# Patient Record
Sex: MALE | Race: WHITE | Employment: OTHER | URBAN - METROPOLITAN AREA
[De-identification: names, ages, dates, MRNs, and addresses within clinical notes are randomized per-mention and may not be internally consistent; named-entity substitution may affect disease eponyms.]

---

## 2017-02-16 ENCOUNTER — ANESTHESIA EVENT (OUTPATIENT)
Dept: ENDOSCOPY | Age: 72
End: 2017-02-16
Payer: MEDICARE

## 2017-02-17 ENCOUNTER — HOSPITAL ENCOUNTER (OUTPATIENT)
Age: 72
Setting detail: OUTPATIENT SURGERY
Discharge: HOME | End: 2017-02-17
Attending: INTERNAL MEDICINE | Admitting: INTERNAL MEDICINE
Payer: MEDICARE

## 2017-02-17 ENCOUNTER — SURGERY (OUTPATIENT)
Age: 72
End: 2017-02-17

## 2017-02-17 ENCOUNTER — ANESTHESIA (OUTPATIENT)
Dept: ENDOSCOPY | Age: 72
End: 2017-02-17
Payer: MEDICARE

## 2017-02-17 VITALS
DIASTOLIC BLOOD PRESSURE: 75 MMHG | BODY MASS INDEX: 23.33 KG/M2 | WEIGHT: 157.5 LBS | SYSTOLIC BLOOD PRESSURE: 132 MMHG | RESPIRATION RATE: 17 BRPM | TEMPERATURE: 97.1 F | HEART RATE: 52 BPM | OXYGEN SATURATION: 100 % | HEIGHT: 69 IN

## 2017-02-17 PROCEDURE — 76040000019: Performed by: INTERNAL MEDICINE

## 2017-02-17 PROCEDURE — 74011250636 HC RX REV CODE- 250/636

## 2017-02-17 PROCEDURE — 77030018846 HC SOL IRR STRL H20 ICUM -A: Performed by: INTERNAL MEDICINE

## 2017-02-17 PROCEDURE — 88305 TISSUE EXAM BY PATHOLOGIST: CPT | Performed by: INTERNAL MEDICINE

## 2017-02-17 PROCEDURE — 74011000250 HC RX REV CODE- 250: Performed by: NURSE ANESTHETIST, CERTIFIED REGISTERED

## 2017-02-17 PROCEDURE — 74011250636 HC RX REV CODE- 250/636: Performed by: NURSE ANESTHETIST, CERTIFIED REGISTERED

## 2017-02-17 PROCEDURE — 77030019988 HC FCPS ENDOSC DISP BSC -B: Performed by: INTERNAL MEDICINE

## 2017-02-17 PROCEDURE — 77030008565 HC TBNG SUC IRR ERBE -B: Performed by: INTERNAL MEDICINE

## 2017-02-17 PROCEDURE — 76060000031 HC ANESTHESIA FIRST 0.5 HR: Performed by: INTERNAL MEDICINE

## 2017-02-17 RX ORDER — SODIUM CHLORIDE 0.9 % (FLUSH) 0.9 %
5-10 SYRINGE (ML) INJECTION EVERY 8 HOURS
Status: DISCONTINUED | OUTPATIENT
Start: 2017-02-17 | End: 2017-02-17 | Stop reason: HOSPADM

## 2017-02-17 RX ORDER — FAMOTIDINE 10 MG/ML
20 INJECTION INTRAVENOUS ONCE
Status: COMPLETED | OUTPATIENT
Start: 2017-02-17 | End: 2017-02-17

## 2017-02-17 RX ORDER — INSULIN LISPRO 100 [IU]/ML
INJECTION, SOLUTION INTRAVENOUS; SUBCUTANEOUS ONCE
Status: DISCONTINUED | OUTPATIENT
Start: 2017-02-17 | End: 2017-02-17 | Stop reason: HOSPADM

## 2017-02-17 RX ORDER — SODIUM CHLORIDE, SODIUM LACTATE, POTASSIUM CHLORIDE, CALCIUM CHLORIDE 600; 310; 30; 20 MG/100ML; MG/100ML; MG/100ML; MG/100ML
75 INJECTION, SOLUTION INTRAVENOUS CONTINUOUS
Status: DISCONTINUED | OUTPATIENT
Start: 2017-02-17 | End: 2017-02-17 | Stop reason: HOSPADM

## 2017-02-17 RX ORDER — PROPOFOL 10 MG/ML
INJECTION, EMULSION INTRAVENOUS AS NEEDED
Status: DISCONTINUED | OUTPATIENT
Start: 2017-02-17 | End: 2017-02-17 | Stop reason: HOSPADM

## 2017-02-17 RX ORDER — SODIUM CHLORIDE 0.9 % (FLUSH) 0.9 %
5-10 SYRINGE (ML) INJECTION AS NEEDED
Status: DISCONTINUED | OUTPATIENT
Start: 2017-02-17 | End: 2017-02-17 | Stop reason: HOSPADM

## 2017-02-17 RX ORDER — ONDANSETRON 2 MG/ML
4 INJECTION INTRAMUSCULAR; INTRAVENOUS ONCE
Status: CANCELLED | OUTPATIENT
Start: 2017-02-17 | End: 2017-02-17

## 2017-02-17 RX ORDER — SODIUM CHLORIDE 0.9 % (FLUSH) 0.9 %
5-10 SYRINGE (ML) INJECTION AS NEEDED
Status: CANCELLED | OUTPATIENT
Start: 2017-02-17

## 2017-02-17 RX ADMIN — FAMOTIDINE 20 MG: 10 INJECTION, SOLUTION INTRAVENOUS at 08:26

## 2017-02-17 RX ADMIN — SODIUM CHLORIDE, SODIUM LACTATE, POTASSIUM CHLORIDE, AND CALCIUM CHLORIDE 75 ML/HR: 600; 310; 30; 20 INJECTION, SOLUTION INTRAVENOUS at 08:26

## 2017-02-17 RX ADMIN — PROPOFOL 50 MG: 10 INJECTION, EMULSION INTRAVENOUS at 09:23

## 2017-02-17 RX ADMIN — PROPOFOL 100 MG: 10 INJECTION, EMULSION INTRAVENOUS at 09:19

## 2017-02-17 NOTE — PROGRESS NOTES
[email or web address]  [facility] [place], [address]      Brief Procedure Note    Adriana Flor  1945  [id number]    Date of Procedure: 2/17/2017    Preoperative diagnosis: Acute chest wall pain [R07.89]  Gastroesophageal reflux disease, esophagitis presence not specified [K21.9]  Encounter for colonoscopy due to history of adenomatous colonic polyps [Z12.11, Z86.010]  Basal cell carcinoma of face [C44.310]    Postoperative diagnosis: Normal Endoscopy    Type of Anesthesia: MAC (Monitored anesthesia care)    Description of findings: same as post op dx    Procedure: Procedure(s):  ENDOSCOPY w/ biopsies    :  Dr. Lenny Meneses MD    Assistant(s): Endoscopy Technician-1: John Dalton  Endoscopy Technician-2: Aury Salas  Endoscopy RN-1: Marely Amaral RN  Endoscopy RN-2: David Wells RN    EBL:None    Specimens:   ID Type Source Tests Collected by Time Destination   1 : Mid and Distal Esophagus biopsies Preservative Esophagus  Lenny Meneses MD 2/17/2017 1067 Pathology       Findings: See printed and scanned procedure note    Complications: None    Dr. Lenny Meneses MD  2/17/2017  9:28 AM

## 2017-02-17 NOTE — H&P
H&P is updated and reviewed on procedure note, physical exam/medications/allergies were reviewed immediately prior to anesthesia    Manual MD [place]  [facility].  [email or web address]  Phone: [phone]  Pager: [phone]  Cell: [phone]. [email or web address]

## 2017-02-17 NOTE — DISCHARGE INSTRUCTIONS
Upper GI Endoscopy: What to Expect at [address] [place] [address]  After you have an endoscopy, you will stay at the hospital or clinic for 1 to 2 hours. This will allow the medicine to wear off. You will be able to go home after your doctor or nurse checks to make sure you are not having any problems. You may have to stay overnight if you had treatment during the test. You may have a sore throat for a day or two after the test.  This care sheet gives you a general idea about what to expect after the test.  How can you care for yourself at home? Activity  · Rest as much as you need to after you go home. · You should be able to go back to your usual activities the day after the test.  Diet  · Follow your doctor's directions for eating after the test.  · Drink plenty of fluids (unless your doctor has told you not to). Medications  · If you have a sore throat the day after the test, use an over-the-counter spray to numb your throat. Follow-up care is a key part of your treatment and safety. Be sure to make and go to all appointments, and call your doctor if you are having problems. It's also a good idea to know your test results and keep a list of the medicines you take. When should you call for help? Call 911 anytime you think you may need emergency care. For example, call if:  · You passed out (lost consciousness). · You cough up blood. · You vomit blood or what looks like coffee grounds. · You pass maroon or very bloody stools. Call your doctor now or seek immediate medical care if:  · You have trouble swallowing. · You have belly pain. · Your stools are black and tarlike or have streaks of blood. · You are sick to your stomach or cannot keep fluids down. Watch closely for changes in your health, and be sure to contact your doctor if:  · Your throat still hurts after a day or two. · You do not get better as expected. Where can you learn more? Go to http://jimena-catarino.info/.   Enter A044 in the search box to learn more about \"Upper GI Endoscopy: What to Expect at Home. \"  Current as of: August 9, 2016  Content Version: 11.1  © 3301-6139 [employer]. Care instructions adapted under license by [employer] (which disclaims liability or warranty for this information). If you have questions about a medical condition or this instruction, always ask your healthcare professional. [address] any warranty or liability for your use of this information. DISCHARGE SUMMARY from Nurse    The following personal items are in your possession at time of discharge:    Dental Appliances: Uppers, Lowers  Visual Aid: Glasses  Hearing Aids/Status: Bilateral (does not wear)               PATIENT INSTRUCTIONS:    After general anesthesia or intravenous sedation, for 24 hours or while taking prescription Narcotics:  · Limit your activities  · Do not drive and operate hazardous machinery  · Do not make important personal or business decisions  · Do  not drink alcoholic beverages  · If you have not urinated within 8 hours after discharge, please contact your surgeon on call. Report the following to your surgeon:  · Excessive pain, swelling, redness or odor of or around the surgical area  · Temperature over 100.5  · Nausea and vomiting lasting longer than 4 hours or if unable to take medications  · Any signs of decreased circulation or nerve impairment to extremity: change in color, persistent  numbness, tingling, coldness or increase pain  · Any questions      *  Please give a list of your current medications to your Primary Care Provider. *  Please update this list whenever your medications are discontinued, doses are      changed, or new medications (including over-the-counter products) are added. *  Please carry medication information at all times in case of emergency situations.           These are general instructions for a healthy lifestyle:    No smoking/ No tobacco products/ Avoid exposure to second hand smoke    Surgeon General's Warning:  Quitting smoking now greatly reduces serious risk to your health. Obesity, smoking, and sedentary lifestyle greatly increases your risk for illness    A healthy diet, regular physical exercise & weight monitoring are important for maintaining a healthy lifestyle    You may be retaining fluid if you have a history of heart failure or if you experience any of the following symptoms:  Weight gain of 3 pounds or more overnight or 5 pounds in a week, increased swelling in our hands or feet or shortness of breath while lying flat in bed. Please call your doctor as soon as you notice any of these symptoms; do not wait until your next office visit. Recognize signs and symptoms of STROKE:    F-face looks uneven    A-arms unable to move or move unevenly    S-speech slurred or non-existent    T-time-call 911 as soon as signs and symptoms begin-DO NOT go       Back to bed or wait to see if you get better-TIME IS BRAIN. Warning Signs of HEART ATTACK     Call 911 if you have these symptoms:   Chest discomfort. Most heart attacks involve discomfort in the center of the chest that lasts more than a few minutes, or that goes away and comes back. It can feel like uncomfortable pressure, squeezing, fullness, or pain.  Discomfort in other areas of the upper body. Symptoms can include pain or discomfort in one or both arms, the back, neck, jaw, or stomach.  Shortness of breath with or without chest discomfort.  Other signs may include breaking out in a cold sweat, nausea, or lightheadedness. Don't wait more than five minutes to call 911 - MINUTES MATTER! Fast action can save your life. Calling 911 is almost always the fastest way to get lifesaving treatment. Emergency Medical Services staff can begin treatment when they arrive -- up to an hour sooner than if someone gets to the hospital by car.        The discharge information has been reviewed with the patient and spouse. The patient and spouse verbalized understanding. Discharge medications reviewed with the patient and spouse and appropriate educational materials and side effects teaching were provided.

## 2017-02-17 NOTE — IP AVS SNAPSHOT
Obed Zaidi 
 
 
 [address] Patient: Chelsy Bai MRN: [id number] QYB:[birth year] You are allergic to the following Allergen Reactions Naproxen Unable to Obtain GI Issues Recent Documentation Height Weight BMI Smoking Status 1.753 m 71.4 kg 23.26 kg/m2 Former Smoker Emergency Contacts Name Discharge Info Relation Home Work Mobile Clinton Gonzalez DISCHARGE CAREGIVER [3] Spouse [3] [phone] About your hospitalization You were admitted on:  February 17, 2017 You last received care in the:  [facility] PACU You were discharged on:  February 17, 2017 Unit phone number:  [phone] Why you were hospitalized Your primary diagnosis was:  Not on File Providers Seen During Your Hospitalizations Provider Role Specialty Primary office phone Brad Diaz MD Attending Provider Gastroenterology [phone] Your Primary Care Physician (PCP) Primary Care Physician Office Phone Office Fax [address], [address] Follow-up Information Follow up With Details Comments Contact Info MD Raegan Hollingsworth [facility] [address]  [address] [facility] [address] 
[phone] Brad Diaz MD  Follow up as instructed [address] [place] [address] [address] [facility] [address] 
[phone] Current Discharge Medication List  
  
CONTINUE these medications which have NOT CHANGED Dose & Instructions Dispensing Information Comments Morning Noon Evening Bedtime  
 alfuzosin SR 10 mg SR tablet Commonly known as:  Rubné Chain Your next dose is: Today, Tomorrow Other:  _________ Dose:  10 mg Take 1 Tab by mouth daily (after dinner). Quantity:  90 Tab Refills:  3  
     
   
   
   
  
 finasteride 5 mg tablet Commonly known as:  PROSCAR Your next dose is: Today, Tomorrow Other:  _________ TAKE 1 TABLET BY MOUTH DAILY Quantity:  90 Tab Refills:  0  
     
   
   
   
  
 omeprazole 20 mg capsule Commonly known as:  PRILOSEC Your next dose is: Today, Tomorrow Other:  _________ Dose:  20 mg Take 20 mg by mouth daily. Refills:  0 Discharge Instructions Upper GI Endoscopy: What to Expect at [facility] Your Recovery After you have an endoscopy, you will stay at the hospital or clinic for 1 to 2 hours. This will allow the medicine to wear off. You will be able to go home after your doctor or nurse checks to make sure you are not having any problems. You may have to stay overnight if you had treatment during the test. You may have a sore throat for a day or two after the test. 
This care sheet gives you a general idea about what to expect after the test. 
How can you care for yourself at home? Activity · Rest as much as you need to after you go home. · You should be able to go back to your usual activities the day after the test. 
Diet · Follow your doctor's directions for eating after the test. 
· Drink plenty of fluids (unless your doctor has told you not to). Medications · If you have a sore throat the day after the test, use an over-the-counter spray to numb your throat. Follow-up care is a key part of your treatment and safety. Be sure to make and go to all appointments, and call your doctor if you are having problems. It's also a good idea to know your test results and keep a list of the medicines you take. When should you call for help? Call 911 anytime you think you may need emergency care. For example, call if: 
· You passed out (lost consciousness). · You cough up blood. · You vomit blood or what looks like coffee grounds. · You pass maroon or very bloody stools. Call your doctor now or seek immediate medical care if: 
· You have trouble swallowing. · You have belly pain. · Your stools are black and tarlike or have streaks of blood. · You are sick to your stomach or cannot keep fluids down. Watch closely for changes in your health, and be sure to contact your doctor if: 
· Your throat still hurts after a day or two. · You do not get better as expected. Where can you learn more? Go to http://jimena-catarino.info/. Enter [phone] in the search box to learn more about \"Upper GI Endoscopy: What to Expect at Home. \" Current as of: August 9, 2016 Content Version: 11.1 © 9173-2317 [employer]. Care instructions adapted under license by "[employer] (which disclaims liability or warranty for this information). If you have questions about a medical condition or this instruction, always ask your healthcare professional. [address] any warranty or liability for your use of this information. DISCHARGE SUMMARY from Nurse The following personal items are in your possession at time of discharge: 
 
Dental Appliances: Uppers, Lowers Visual Aid: Glasses Hearing Aids/Status: Bilateral (does not wear) PATIENT INSTRUCTIONS: 
 
 
F-face looks uneven A-arms unable to move or move unevenly S-speech slurred or non-existent T-time-call 911 as soon as signs and symptoms begin-DO NOT go Back to bed or wait to see if you get better-TIME IS BRAIN. Warning Signs of HEART ATTACK Call 911 if you have these symptoms: 
? Chest discomfort. Most heart attacks involve discomfort in the center of the chest that lasts more than a few minutes, or that goes away and comes back. It can feel like uncomfortable pressure, squeezing, fullness, or pain. ? Discomfort in other areas of the upper body. Symptoms can include pain or discomfort in one or both arms, the back, neck, jaw, or stomach. ? Shortness of breath with or without chest discomfort. ? Other signs may include breaking out in a cold sweat, nausea, or lightheadedness. Don't wait more than five minutes to call [address]! Fast action can save your life. Calling 911 is almost always the fastest way to get lifesaving treatment. Emergency Medical Services staff can begin treatment when they arrive  up to an hour sooner than if someone gets to the hospital by car. The discharge information has been reviewed with the patient and spouse. The patient and spouse verbalized understanding. Discharge medications reviewed with the patient and spouse and appropriate educational materials and side effects teaching were provided. Discharge Orders None Introducing [facility]! Opal Willard introduces [employer] patient portal. Now you can access parts of your medical record, email your doctor's office, and request medication refills online. 1. In your internet browser, go to [email or web address] 2. Click on the First Time User? Click Here link in the Sign In box. You will see the New Member Sign Up page. 3. Enter your Valid[employer]-IVC Access Code exactly as it appears below. You will not need to use this code after youve completed the sign-up process. If you do not sign up before the expiration date, you must request a new code. · [employer]C Access Code: [email or web address] Expires: 4/5/2017  1:55 PM 
 
4. Enter the last four digits of your Social Security Number (xxxx) and Date of Birth (mm/dd/yyyy) as indicated and click Submit. You will be taken to the next sign-up page. 5. Create a Needlet ID. This will be your Valid[employer]-IVC login ID and cannot be changed, so think of one that is secure and easy to remember. 6. Create a [employer] password. You can change your password at any time. 7. Enter your Password Reset Question and Answer. This can be used at a later time if you forget your password. 8. Enter your e-mail address. You will receive e-mail notification when new information is available in [address] 9. Click Sign Up. You can now view and download portions of your medical record. 10. Click the Download Summary menu link to download a portable copy of your medical information. If you have questions, please visit the Frequently Asked Questions section of the Loku website. Remember, Loku is NOT to be used for urgent needs. For medical emergencies, dial 911. Now available from your iPhone and Android! General Information Please provide this summary of care documentation to your next provider. Patient Signature:  ____________________________________________________________ Date:  ____________________________________________________________  
  
Yann Graves Provider Signature:  ____________________________________________________________ Date:  ____________________________________________________________

## 2017-02-17 NOTE — ANESTHESIA POSTPROCEDURE EVALUATION
Post-Anesthesia Evaluation & Assessment    Visit Vitals    /75 (BP 1 Location: Right arm, BP Patient Position: At rest)    Pulse (!) 52    Temp 36.2 °C (97.1 °F)    Resp 17    Ht 5' 9\" (1.753 m)    Wt 71.4 kg (157 lb 8 oz)    SpO2 100%    BMI 23.26 kg/m2       Post-operative hydration adequate. Pain score (VAS): 0 Pain Scale 1: Numeric (0 - 10) (02/17/17 1000)  Pain Intensity 1: 0 (02/17/17 1000)   Managed. Mental status & Level of consciousness: alert and oriented x 3    Neurological status: moves all extremities, sensation grossly intact    Pulmonary status: airway patent, no supplemental oxygen required    Complications related to anesthesia: none    Patient has met all discharge requirements.     Additional comments:        Marqutia Baca MD  February 17, 2017

## 2017-02-17 NOTE — ANESTHESIA PREPROCEDURE EVALUATION
Anesthetic History   No history of anesthetic complications            Review of Systems / Medical History  Patient summary reviewed and pertinent labs reviewed    Pulmonary  Within defined limits                 Neuro/Psych   Within defined limits           Cardiovascular                  Exercise tolerance: >4 METS     GI/Hepatic/Renal     GERD           Endo/Other             Other Findings   Comments: Current Smoker? NO       Elective Surgery? Yes       Abstained from smoking 24 hours prior to anesthesia? N/A    Risk Factors for Postoperative nausea/vomiting:       History of postoperative nausea/vomiting? NO       Female? NO       Motion sickness? NO       Intended opioid administration for postoperative analgesia?   NO           Physical Exam    Airway  Mallampati: II  TM Distance: 4 - 6 cm  Neck ROM: normal range of motion   Mouth opening: Normal     Cardiovascular  Regular rate and rhythm,  S1 and S2 normal,  no murmur, click, rub, or gallop             Dental    Dentition: Full upper dentures and Lower partial plate     Pulmonary  Breath sounds clear to auscultation               Abdominal  GI exam deferred       Other Findings            Anesthetic Plan    ASA: 2  Anesthesia type: MAC          Induction: Intravenous  Anesthetic plan and risks discussed with: Patient

## 2017-02-22 ENCOUNTER — HOSPITAL ENCOUNTER (OUTPATIENT)
Dept: LAB | Age: 72
Discharge: HOME | End: 2017-02-22
Payer: MEDICARE

## 2017-02-22 DIAGNOSIS — Z01.818 PRE-OP EVALUATION: ICD-10-CM

## 2017-02-22 LAB
ANION GAP BLD CALC-SCNC: 9 MMOL/L (ref 3–18)
APPEARANCE UR: CLEAR
APTT PPP: 28.1 SEC (ref 23–36.4)
ATRIAL RATE: 62 BPM
BILIRUB UR QL: NEGATIVE
BUN SERPL-MCNC: 24 MG/DL (ref 7–18)
BUN/CREAT SERPL: 21 (ref 12–20)
CALCIUM SERPL-MCNC: 8.8 MG/DL (ref 8.5–10.1)
CALCULATED P AXIS, ECG09: 46 DEGREES
CALCULATED R AXIS, ECG10: 14 DEGREES
CALCULATED T AXIS, ECG11: 36 DEGREES
CHLORIDE SERPL-SCNC: 105 MMOL/L (ref 100–108)
CO2 SERPL-SCNC: 27 MMOL/L (ref 21–32)
COLOR UR: YELLOW
CREAT SERPL-MCNC: 1.12 MG/DL (ref 0.6–1.3)
DIAGNOSIS, 93000: NORMAL
ERYTHROCYTE [DISTWIDTH] IN BLOOD BY AUTOMATED COUNT: 13.4 % (ref 11.6–14.5)
GLUCOSE SERPL-MCNC: 115 MG/DL (ref 74–99)
GLUCOSE UR STRIP.AUTO-MCNC: NEGATIVE MG/DL
HCT VFR BLD AUTO: 42 % (ref 36–48)
HGB BLD-MCNC: 14.1 G/DL (ref 13–16)
HGB UR QL STRIP: NEGATIVE
INR PPP: 0.9 (ref 0.8–1.2)
KETONES UR QL STRIP.AUTO: NEGATIVE MG/DL
LEUKOCYTE ESTERASE UR QL STRIP.AUTO: NEGATIVE
MCH RBC QN AUTO: 31.3 PG (ref 24–34)
MCHC RBC AUTO-ENTMCNC: 33.6 G/DL (ref 31–37)
MCV RBC AUTO: 93.1 FL (ref 74–97)
NITRITE UR QL STRIP.AUTO: NEGATIVE
P-R INTERVAL, ECG05: 134 MS
PH UR STRIP: 6.5 [PH] (ref 5–8)
PLATELET # BLD AUTO: 231 K/UL (ref 135–420)
PMV BLD AUTO: 11.6 FL (ref 9.2–11.8)
POTASSIUM SERPL-SCNC: 4.2 MMOL/L (ref 3.5–5.5)
PROT UR STRIP-MCNC: NEGATIVE MG/DL
PROTHROMBIN TIME: 12.2 SEC (ref 11.5–15.2)
Q-T INTERVAL, ECG07: 424 MS
QRS DURATION, ECG06: 80 MS
QTC CALCULATION (BEZET), ECG08: 430 MS
RBC # BLD AUTO: 4.51 M/UL (ref 4.7–5.5)
SODIUM SERPL-SCNC: 141 MMOL/L (ref 136–145)
SP GR UR REFRACTOMETRY: 1.02 (ref 1–1.03)
UROBILINOGEN UR QL STRIP.AUTO: 1 EU/DL (ref 0.2–1)
VENTRICULAR RATE, ECG03: 62 BPM
WBC # BLD AUTO: 5.9 K/UL (ref 4.6–13.2)

## 2017-02-22 PROCEDURE — 87086 URINE CULTURE/COLONY COUNT: CPT | Performed by: UROLOGY

## 2017-02-22 PROCEDURE — 80048 BASIC METABOLIC PNL TOTAL CA: CPT | Performed by: UROLOGY

## 2017-02-22 PROCEDURE — 36415 COLL VENOUS BLD VENIPUNCTURE: CPT | Performed by: UROLOGY

## 2017-02-22 PROCEDURE — 85730 THROMBOPLASTIN TIME PARTIAL: CPT | Performed by: UROLOGY

## 2017-02-22 PROCEDURE — 85027 COMPLETE CBC AUTOMATED: CPT | Performed by: UROLOGY

## 2017-02-22 PROCEDURE — 85610 PROTHROMBIN TIME: CPT | Performed by: UROLOGY

## 2017-02-22 PROCEDURE — 81003 URINALYSIS AUTO W/O SCOPE: CPT | Performed by: UROLOGY

## 2017-02-24 LAB
BACTERIA SPEC CULT: NORMAL
SERVICE CMNT-IMP: NORMAL

## 2017-03-14 ENCOUNTER — HOSPITAL ENCOUNTER (OUTPATIENT)
Dept: LAB | Age: 72
Discharge: HOME | End: 2017-03-14
Payer: MEDICARE

## 2017-03-14 PROCEDURE — 88307 TISSUE EXAM BY PATHOLOGIST: CPT | Performed by: UROLOGY

## 2017-03-14 PROCEDURE — 88305 TISSUE EXAM BY PATHOLOGIST: CPT | Performed by: UROLOGY

## 2018-10-01 ENCOUNTER — OFFICE VISIT (OUTPATIENT)
Dept: FAMILY MEDICINE CLINIC | Age: 73
End: 2018-10-01

## 2018-10-01 VITALS
HEIGHT: 68 IN | OXYGEN SATURATION: 98 % | WEIGHT: 152 LBS | SYSTOLIC BLOOD PRESSURE: 146 MMHG | RESPIRATION RATE: 16 BRPM | BODY MASS INDEX: 23.04 KG/M2 | TEMPERATURE: 98.2 F | DIASTOLIC BLOOD PRESSURE: 72 MMHG | HEART RATE: 65 BPM

## 2018-10-01 DIAGNOSIS — N40.1 BENIGN PROSTATIC HYPERPLASIA WITH LOWER URINARY TRACT SYMPTOMS, SYMPTOM DETAILS UNSPECIFIED: Primary | ICD-10-CM

## 2018-10-01 DIAGNOSIS — M19.90 ARTHRITIS: ICD-10-CM

## 2018-10-01 DIAGNOSIS — I10 ESSENTIAL HYPERTENSION: ICD-10-CM

## 2018-10-01 DIAGNOSIS — K21.9 GASTROESOPHAGEAL REFLUX DISEASE WITHOUT ESOPHAGITIS: ICD-10-CM

## 2018-10-01 DIAGNOSIS — R36.1 BLOOD IN SEMEN: ICD-10-CM

## 2018-10-01 DIAGNOSIS — E04.9 ENLARGED THYROID GLAND: ICD-10-CM

## 2018-10-01 RX ORDER — FINASTERIDE 5 MG/1
5 TABLET, FILM COATED ORAL DAILY
Qty: 30 TAB | Refills: 1 | Status: SHIPPED | OUTPATIENT
Start: 2018-10-01 | End: 2018-11-15 | Stop reason: SDUPTHER

## 2018-10-01 RX ORDER — LISINOPRIL 10 MG/1
10 TABLET ORAL DAILY
Qty: 30 TAB | Refills: 1 | Status: SHIPPED | OUTPATIENT
Start: 2018-10-01 | End: 2018-11-15 | Stop reason: SDUPTHER

## 2018-10-01 RX ORDER — OMEPRAZOLE 20 MG/1
20 CAPSULE, DELAYED RELEASE ORAL DAILY
Qty: 30 CAP | Refills: 1 | Status: SHIPPED | OUTPATIENT
Start: 2018-10-01 | End: 2018-11-15 | Stop reason: SDUPTHER

## 2018-10-01 RX ORDER — ATORVASTATIN CALCIUM 20 MG/1
20 TABLET, FILM COATED ORAL DAILY
Qty: 30 TAB | Refills: 1 | Status: SHIPPED | OUTPATIENT
Start: 2018-10-01 | End: 2018-11-15 | Stop reason: SDUPTHER

## 2018-10-01 RX ORDER — ALFUZOSIN HYDROCHLORIDE 10 MG/1
10 TABLET, EXTENDED RELEASE ORAL
Qty: 30 TAB | Refills: 1 | Status: SHIPPED | OUTPATIENT
Start: 2018-10-01 | End: 2018-11-15 | Stop reason: SDUPTHER

## 2018-10-01 NOTE — PROGRESS NOTES
HISTORY OF PRESENT ILLNESS Nataly Curiel is a 72 y.o. male. HPI: Here as a new patient to get establish care. His prior PCP passed away. H/o hypertension. On medication. Today mild elevated blood pressure but provided home blood pressure log and noted blood pressure was wnl. Asymptomatic. Denies any headache, dizziness, no chest pain or trouble breathing, no arm or leg weakness. No nausea or vomiting, no weight or appetite changes, no depression or anxiety. No  bowel complains, no palpitation, no diaphoresis. No abdominal pain. No unusual fatigue. No sleep concern. H/o BPH. Currently on symptomatic treatment and denies any urinary complains. No fever. Had blood in semen and under urology care. No exam noted palpable enlarge thyroid gland. Denies any trouble swallowing or change in voice. No appetite or weight changes. No unusual fatigue. No bowel movement changes. No mood changes. Visit Vitals  /72 (BP 1 Location: Left arm, BP Patient Position: Sitting)  Pulse 65  Temp 98.2 °F (36.8 °C) (Oral)  Resp 16  
 Ht 5' 8.25\" (1.734 m)  Wt 152 lb (68.9 kg)  SpO2 98%  BMI 22.94 kg/m2 Also bilateral hand arthritis and stiffness. Following Dr. Juan Patterson and getting symptomatic treatment. Mentioned that prior PCP had done work up for Rheumatoid arthritis and it was negative. Had taken shingle vaccine. Has taken both pneumonia and flu shot. H/o GERD. Asymptomatic at this time. Post EGD and no concern. ROS: see HPI Physical Exam  
Constitutional: He is oriented to person, place, and time. No distress. Neck: Thyromegaly present. Cardiovascular: Normal rate, regular rhythm and normal heart sounds. Pulmonary/Chest: CTA Abdominal: Soft. Bowel sounds are normal. There is no tenderness. Musculoskeletal: He exhibits no edema. Lymphadenopathy:  
  He has no cervical adenopathy. Neurological: He is oriented to person, place, and time. Psychiatric: His behavior is normal.  
 
 
ASSESSMENT and PLAN 
  ICD-10-CM ICD-9-CM 1. Benign prostatic hyperplasia with lower urinary tract symptoms, symptom details unspecified: following urology. On medical treatment. N40.1 600.01   
2. Essential hypertension: initially was elevated. Home blood pressure log was wnl. For now asymptomatic. Will observe. I10 401.9 3. Gastroesophageal reflux disease without esophagitis: post EGD. Now stable on PPI.  K21.9 530.81   
 post EGD 4. Blood in semen: following urology. R36.1 608.82   
5. Arthritis: symptomatic treatment with NSAID, ice and home exercise. Following Dr. Yrn Alexis. M19.90 716.90   
6. Enlarged thyroid gland: getting ultrasound and thyroid function. E04.9 240.9 US THYROID/PARATHYROID/SOFT TISS  
   TSH 3RD GENERATION Pt understood and agree with the plan Review hM Follow-up Disposition: Not on File 2 months,.

## 2018-10-01 NOTE — PATIENT INSTRUCTIONS
Arthritis: Care Instructions Your Care Instructions Arthritis, also called osteoarthritis, is a breakdown of the cartilage that cushions your joints. When the cartilage wears down, your bones rub against each other. This causes pain and stiffness. Many people have some arthritis as they age. Arthritis most often affects the joints of the spine, hands, hips, knees, or feet. You can take simple measures to protect your joints, ease your pain, and help you stay active. Follow-up care is a key part of your treatment and safety. Be sure to make and go to all appointments, and call your doctor if you are having problems. It's also a good idea to know your test results and keep a list of the medicines you take. How can you care for yourself at home? · Stay at a healthy weight. Being overweight puts extra strain on your joints. · Talk to your doctor or physical therapist about exercises that will help ease joint pain. ¨ Stretch. You may enjoy gentle forms of yoga to help keep your joints and muscles flexible. ¨ Walk instead of jog. Other types of exercise that are less stressful on the joints include riding a bicycle, swimming, mikal chi, or water exercise. ¨ Lift weights. Strong muscles help reduce stress on your joints. Stronger thigh muscles, for example, take some of the stress off of the knees and hips. Learn the right way to lift weights so you do not make joint pain worse. · Take your medicines exactly as prescribed. Call your doctor if you think you are having a problem with your medicine. · Take pain medicines exactly as directed. ¨ If the doctor gave you a prescription medicine for pain, take it as prescribed. ¨ If you are not taking a prescription pain medicine, ask your doctor if you can take an over-the-counter medicine. · Use a cane, crutch, walker, or another device if you need help to get around. These can help rest your joints.  You also can use other things to make life easier, such as a higher toilet seat and padded handles on kitchen utensils. · Do not sit in low chairs, which can make it hard to get up. · Put heat or cold on your sore joints as needed. Use whichever helps you most. You also can take turns with hot and cold packs. ¨ Apply heat 2 or 3 times a day for 20 to 30 minutes-using a heating pad, hot shower, or hot pack-to relieve pain and stiffness. ¨ Put ice or a cold pack on your sore joint for 10 to 20 minutes at a time. Put a thin cloth between the ice and your skin. When should you call for help? Call your doctor now or seek immediate medical care if: 
  · You have sudden swelling, warmth, or pain in any joint.  
  · You have joint pain and a fever or rash.  
  · You have such bad pain that you cannot use a joint.  
 Watch closely for changes in your health, and be sure to contact your doctor if: 
  · You have mild joint symptoms that continue even with more than 6 weeks of care at home.  
  · You have stomach pain or other problems with your medicine. Where can you learn more? Go to http://jimena-catarino.info/. Enter J693 in the search box to learn more about \"Arthritis: Care Instructions. \" Current as of: October 10, 2017 Content Version: 11.7 © 2597-5542 [employer]. Care instructions adapted under license by [employer] (which disclaims liability or warranty for this information). If you have questions about a medical condition or this instruction, always ask your healthcare professional. [place] [address] any warranty or liability for your use of this information. Hand Arthritis: Exercises Your Care Instructions Here are some examples of exercises for hand arthritis. Start each exercise slowly. Ease off the exercise if you start to have pain.  
Your doctor or your physical or occupational therapist will tell you when you can start these exercises and which ones will work best for you. How to do the exercises Tendon mary alice 1. In this exercise, the steps follow one another to a make a continuous movement. 2. With your affected hand, point your fingers and thumb straight up. Your wrist should be relaxed, following the line of your fingers and thumb. 3. Curl your fingers so that the top two joints in them are bent, and your fingers wrap down. Your fingertips should touch or be near the base of your fingers. Your fingers will look like a hook. 4. Make a fist by bending your knuckles. Your thumb can gently rest against your index (pointing) finger. 5. Unwind your fingers slightly so that your fingertips can touch the base of your palm. Your thumb can rest against your index finger. 6. Move back to your starting position, with your fingers and thumb pointing up. 7. Repeat the series of motions 8 to 12 times. 8. Switch hands and repeat steps 1 through 6, even if only one hand is sore. Intrinsic flexion 1. Rest your affected hand on a table and bend the large joints where your fingers connect to your hand. Keep your thumb and the other joints in your fingers straight. 2. Slowly straighten your fingers. Your wrist should be relaxed, following the line of your fingers and thumb. 3. Move back to your starting position, with your hand bent. 4. Repeat 8 to 12 times. 5. Switch hands and repeat steps 1 through 4, even if only one hand is sore. Finger extension 1. Place your affected hand flat on a table. 2. Lift and then lower one finger at a time off the table. 3. Repeat 8 to 12 times. 4. Switch hands and repeat steps 1 through 3, even if only one hand is sore. MP extension 1. Place your good hand on a table, palm up.  Put your affected hand on top of your good hand with your fingers wrapped around the thumb of your good hand like you are making a fist. 
 2. Slowly uncurl the joints of your affected hand where your fingers connect to your hand so that only the top two joints of your fingers are bent. Your fingers will look like a hook. 3. Move back to your starting position, with your fingers wrapped around your good thumb. 4. Repeat 8 to 12 times. 5. Switch hands and repeat steps 1 through 4, even if only one hand is sore. PIP extension (with MP extension) 1. Place your good hand on a table, palm up. Put your affected hand on top of your good hand, palm up. 2. Use the thumb and fingers of your good hand to grasp below the middle joint of one finger of your affected hand. 3. Straighten the last two joints of that finger. 4. Repeat 8 to 12 times. 5. Repeat steps 1 through 4 with each finger. 6. Switch hands and repeat steps 1 through 5, even if only one hand is sore. DIP flexion 1. With your good hand, grasp one finger of your affected hand. Your thumb will be on the top side of your finger just below the joint that is closest to your fingernail. 2. Slowly bend your affected finger only at the joint closest to your fingernail. 3. Repeat 8 to 12 times. 4. Repeat steps 1 through 3 with each finger. 5. Switch hands and repeat steps 1 through 4, even if only one hand is sore. Follow-up care is a key part of your treatment and safety. Be sure to make and go to all appointments, and call your doctor if you are having problems. It's also a good idea to know your test results and keep a list of the medicines you take. Where can you learn more? Go to http://jimena-catarino.info/. Enter V146 in the search box to learn more about \"Hand Arthritis: Exercises. \" Current as of: November 29, 2017 Content Version: 11.7 © 4575-5450 [employer]. Care instructions adapted under license by [employer] (which disclaims liability or warranty for this information).  If you have questions about a medical condition or this instruction, always ask your healthcare professional. [place] [address] any warranty or liability for your use of this information. Benign Prostatic Hyperplasia: Care Instructions Your Care Instructions Benign prostatic hyperplasia, or BPH, is an enlarged prostate gland. The prostate is a small gland that makes some of the fluid in semen. Prostate enlargement happens to almost all men as they age. It is usually not serious. BPH does not cause prostate cancer. As the prostate gets bigger, it may partly block the flow of urine. You may have a hard time getting a urine stream started or completely stopped. BPH can cause dribbling. You may have a weak urine stream, or you may have to urinate more often than you used to, especially at night. Most men find these problems easy to manage. You do not need treatment unless your symptoms bother you a lot or you have other problems, such as bladder infections or stones. In these cases, medicines may help. Surgery is not needed unless the urine flow is blocked or the symptoms do not get better with medicine. Follow-up care is a key part of your treatment and safety. Be sure to make and go to all appointments, and call your doctor if you are having problems. It's also a good idea to know your test results and keep a list of the medicines you take. How can you care for yourself at home? · Take plenty of time to urinate. Try to relax. · Try \"double voiding. \" Urinate as much you can, relax for a few moments, and then try to urinate again. · Sit on the toilet to urinate. · Read or think of other things while you are waiting. · Turn on a faucet, or try to picture running water. Some men find that this helps get their urine flowing. · If dribbling is a problem, wash your penis daily to avoid skin irritation and infection. · Avoid caffeine and alcohol.  These drinks will increase how often you need to urinate. Spread your fluid intake throughout the day. If the urge to urinate often wakes you at night, limit your fluid intake in the evening. Urinate right before you go to bed. · Many over-the-counter cold and allergy medicines can make the symptoms of BPH worse. Avoid antihistamines, decongestants, and allergy pills, if you can. Read the warnings on the package. · If you take any prescription medicines, especially tranquilizers or antidepressants, ask your doctor or pharmacist whether they can cause urination problems. There may be other medicines you can use that do not cause urinary problems. · Be safe with medicines. Take your medicines exactly as prescribed. Call your doctor if you think you are having a problem with your medicine. When should you call for help? Call your doctor now or seek immediate medical care if: 
  · You cannot urinate at all.  
  · You have symptoms of a urinary infection. For example: ¨ You have blood or pus in your urine. ¨ You have pain in your back just below your rib cage. This is called flank pain. ¨ You have a fever, chills, or body aches. ¨ It hurts to urinate. ¨ You have groin or belly pain.  
 Watch closely for changes in your health, and be sure to contact your doctor if: 
  · It hurts when you ejaculate.  
  · Your urinary problems get a lot worse or bother you a lot. Where can you learn more? Go to http://jimena-catarino.info/. Enter T307 in the search box to learn more about \"Benign Prostatic Hyperplasia: Care Instructions. \" Current as of: December 3, 2017 Content Version: 11.7 © 5942-3364 [employer]. Care instructions adapted under license by [employer] (which disclaims liability or warranty for this information).  If you have questions about a medical condition or this instruction, always ask your healthcare professional. [place] [address] any warranty or liability for your use of this information. Gastroesophageal Reflux Disease (GERD): Care Instructions Your Care Instructions Gastroesophageal reflux disease (GERD) is the backward flow of stomach acid into the esophagus. The esophagus is the tube that leads from your throat to your stomach. A one-way valve prevents the stomach acid from moving up into this tube. When you have GERD, this valve does not close tightly enough. If you have mild GERD symptoms including heartburn, you may be able to control the problem with antacids or over-the-counter medicine. Changing your diet, losing weight, and making other lifestyle changes can also help reduce symptoms. Follow-up care is a key part of your treatment and safety. Be sure to make and go to all appointments, and call your doctor if you are having problems. It's also a good idea to know your test results and keep a list of the medicines you take. How can you care for yourself at home? · Take your medicines exactly as prescribed. Call your doctor if you think you are having a problem with your medicine. · Your doctor may recommend over-the-counter medicine. For mild or occasional indigestion, antacids, such as Tums, Gaviscon, Mylanta, or Maalox, may help. Your doctor also may recommend over-the-counter acid reducers, such as Pepcid AC, Tagamet HB, Zantac 75, or Prilosec. Read and follow all instructions on the label. If you use these medicines often, talk with your doctor. · Change your eating habits. ¨ It's best to eat several small meals instead of two or three large meals. ¨ After you eat, wait 2 to 3 hours before you lie down. ¨ Chocolate, mint, and alcohol can make GERD worse. ¨ Spicy foods, foods that have a lot of acid (like tomatoes and oranges), and coffee can make GERD symptoms worse in some people. If your symptoms are worse after you eat a certain food, you may want to stop eating that food to see if your symptoms get better. · Do not smoke or chew tobacco. Smoking can make GERD worse. If you need help quitting, talk to your doctor about stop-smoking programs and medicines. These can increase your chances of quitting for good. · If you have GERD symptoms at night, raise the head of your bed 6 to 8 inches by putting the frame on blocks or placing a foam wedge under the head of your mattress. (Adding extra pillows does not work.) · Do not wear tight clothing around your middle. · Lose weight if you need to. Losing just 5 to 10 pounds can help. When should you call for help? Call your doctor now or seek immediate medical care if: 
  · You have new or different belly pain.  
  · Your stools are black and tarlike or have streaks of blood.  
 Watch closely for changes in your health, and be sure to contact your doctor if: 
  · Your symptoms have not improved after 2 days.  
  · Food seems to catch in your throat or chest.  
Where can you learn more? Go to http://jimena-catarino.info/. Enter Z448 in the search box to learn more about \"Gastroesophageal Reflux Disease (GERD): Care Instructions. \" Current as of: May 12, 2017 Content Version: 11.7 © 8595-2005 [employer]. Care instructions adapted under license by [email or web address] (which disclaims liability or warranty for this information). If you have questions about a medical condition or this instruction, always ask your healthcare professional. [place] [address] any warranty or liability for your use of this information. Low Sodium Diet (2,000 Milligram): Care Instructions Your Care Instructions Too much sodium causes your body to hold on to extra water. This can raise your blood pressure and force your heart and kidneys to work harder. In very serious cases, this could cause you to be put in the hospital. It might even be life-threatening. By limiting sodium, you will feel better and lower your risk of serious problems. The most common source of sodium is salt. People get most of the salt in their diet from canned, prepared, and packaged foods. Fast food and restaurant meals also are very high in sodium. Your doctor will probably limit your sodium to less than 2,000 milligrams (mg) a day. This limit counts all the sodium in prepared and packaged foods and any salt you add to your food. Follow-up care is a key part of your treatment and safety. Be sure to make and go to all appointments, and call your doctor if you are having problems. It's also a good idea to know your test results and keep a list of the medicines you take. How can you care for yourself at home? Read food labels · Read labels on cans and food packages. The labels tell you how much sodium is in each serving. Make sure that you look at the serving size. If you eat more than the serving size, you have eaten more sodium. · Food labels also tell you the Percent Daily Value for sodium. Choose products with low Percent Daily Values for sodium. · Be aware that sodium can come in forms other than salt, including monosodium glutamate (MSG), sodium citrate, and sodium bicarbonate (baking soda). MSG is often added to Asian food. When you eat out, you can sometimes ask for food without MSG or added salt. Buy low-sodium foods · Buy foods that are labeled \"unsalted\" (no salt added), \"sodium-free\" (less than 5 mg of sodium per serving), or \"low-sodium\" (less than 140 mg of sodium per serving). Foods labeled \"reduced-sodium\" and \"light sodium\" may still have too much sodium. Be sure to read the label to see how much sodium you are getting. · Buy fresh vegetables, or frozen vegetables without added sauces. Buy low-sodium versions of canned vegetables, soups, and other canned goods. Prepare low-sodium meals · Cut back on the amount of salt you use in cooking. This will help you adjust to the taste. Do not add salt after cooking.  One teaspoon of salt has about 2,300 mg of sodium. · Take the salt shaker off the table. · Flavor your food with garlic, lemon juice, onion, vinegar, herbs, and spices. Do not use soy sauce, lite soy sauce, steak sauce, onion salt, garlic salt, celery salt, mustard, or ketchup on your food. · Use low-sodium salad dressings, sauces, and ketchup. Or make your own salad dressings and sauces without adding salt. · Use less salt (or none) when recipes call for it. You can often use half the salt a recipe calls for without losing flavor. Other foods such as rice, pasta, and grains do not need added salt. · Rinse canned vegetables, and cook them in fresh water. This removes some-but not all-of the salt. · Avoid water that is naturally high in sodium or that has been treated with water softeners, which add sodium. Call your local water company to find out the sodium content of your water supply. If you buy bottled water, read the label and choose a sodium-free brand. Avoid high-sodium foods · Avoid eating: ¨ Smoked, cured, salted, and canned meat, fish, and poultry. ¨ Ham, maravilla, hot dogs, and luncheon meats. ¨ Regular, hard, and processed cheese and regular peanut butter. ¨ Crackers with salted tops, and other salted snack foods such as pretzels, chips, and salted popcorn. ¨ Frozen prepared meals, unless labeled low-sodium. ¨ Canned and dried soups, broths, and bouillon, unless labeled sodium-free or low-sodium. ¨ Canned vegetables, unless labeled sodium-free or low-sodium. ¨ [place] fries, pizza, tacos, and other fast foods. ¨ Pickles, olives, ketchup, and other condiments, especially soy sauce, unless labeled sodium-free or low-sodium. Where can you learn more? Go to http://jimena-catarino.info/. Enter L361 in the search box to learn more about \"Low Sodium Diet (2,000 Milligram): Care Instructions. \" Current as of: May 12, 2017 Content Version: 11.7 © 1744-3985 [employer]. Care instructions adapted under license by [employer] (which disclaims liability or warranty for this information). If you have questions about a medical condition or this instruction, always ask your healthcare professional. [address] any warranty or liability for your use of this information.

## 2018-10-01 NOTE — PROGRESS NOTES
1. Have you been to the ER, urgent care clinic since your last visit? Hospitalized since your last visit? No 
 
2. Have you seen or consulted any other health care providers outside of the [address] [place] [address] since your last visit? Include any pap smears or colon screening. ENT Dr. Landen Edwards for allergy LOV: 6/2018, Dr. Gloria Kelley: 2 months ago, Dr. Zay Desai Dentist LOV: 5/2018. Flu vaccine was received on 9/29/18. PCV-13 on 9/17/15 and pneumo 23 on 10/2013.

## 2018-10-01 NOTE — MR AVS SNAPSHOT
[address] 
[phone] Patient: Jackie Nunes MRN: [id number] CFE:[id number] Visit Information Date & Time Provider Department Dept. Phone Encounter #  
 10/1/2018  1:45 PM Darrius Brown, [address] [id number] Upcoming Health Maintenance Date Due Hepatitis C Screening 1945 COLONOSCOPY 8/20/1965 DTaP/Tdap/Td series (1 - Tdap) 8/20/1968 Shingrix Vaccine Age 50> (1 of 2) 8/20/1997 GLAUCOMA SCREENING Q2Y 8/20/2012 MEDICARE YEARLY EXAM 3/14/2018 Influenza Age 5 to Adult 8/1/2018 Pneumococcal 65+ Low/Medium Risk (1 of 2 - PCV13) 10/7/2019* *Topic was postponed. The date shown is not the original due date. Allergies as of 10/1/2018  Review Complete On: 10/1/2018 By: Darrius Brown MD  
  
 Severity Noted Reaction Type Reactions Naproxen  01/12/2017    Unable to Obtain Patient states that he is not allergic. Current Immunizations  Never Reviewed Name Date Influenza High Dose Vaccine PF 9/29/2018 Influenza Vaccine 10/1/2016 12:00 AM  
 Pneumococcal Conjugate (PCV-13) 9/17/2015 12:00 AM  
 Pneumococcal Polysaccharide (PPSV-23) 10/1/2013 12:00 AM  
 Zoster Vaccine, Live 9/17/2015 12:00 AM  
  
 Not reviewed this visit You Were Diagnosed With   
  
 Codes Comments Benign prostatic hyperplasia with lower urinary tract symptoms, symptom details unspecified    -  Primary ICD-10-CM: N40.1 ICD-9-CM: 600.01 Essential hypertension     ICD-10-CM: I10 
ICD-9-CM: 401.9 Gastroesophageal reflux disease without esophagitis     ICD-10-CM: K21.9 ICD-9-CM: 530.81 post EGD Blood in semen     ICD-10-CM: R36.1 ICD-9-CM: 608.82 Arthritis     ICD-10-CM: M19.90 ICD-9-CM: 716.90 Enlarged thyroid gland     ICD-10-CM: E04.9 ICD-9-CM: 240.9 Vitals BP Pulse Temp Resp Height(growth percentile) Weight(growth percentile) 146/72 (BP 1 Location: Left arm, BP Patient Position: Sitting) 65 98.2 °F (36.8 °C) (Oral) 16 5' 8.25\" (1.734 m) 152 lb (68.9 kg) SpO2 BMI Smoking Status 98% 22.94 kg/m2 Former Smoker Vitals History BMI and BSA Data Body Mass Index Body Surface Area  
 22.94 kg/m 2 1.82 m 2 Preferred Pharmacy Pharmacy Name Phone Neil Matson, [facility] [phone] Your Updated Medication List  
  
   
This list is accurate as of 10/1/18  2:48 PM.  Always use your most recent med list.  
  
  
  
  
 acetaminophen 500 mg tablet Commonly known as:  TYLENOL Take  by mouth every six (6) hours as needed for Pain. alfuzosin SR 10 mg SR tablet Commonly known as:  Nette Linen Take 1 Tab by mouth daily (after dinner). atorvastatin 20 mg tablet Commonly known as:  LIPITOR Take 1 Tab by mouth daily. diclofenac EC 75 mg EC tablet Commonly known as:  VOLTAREN  
  
 finasteride 5 mg tablet Commonly known as:  PROSCAR Take 1 Tab by mouth daily. lisinopril 10 mg tablet Commonly known as:  Sravan Spinner Take 1 Tab by mouth daily. omeprazole 20 mg capsule Commonly known as:  PRILOSEC Take 1 Cap by mouth daily. Prescriptions Sent to Pharmacy Refills  
 lisinopril (PRINIVIL, ZESTRIL) 10 mg tablet 1 Sig: Take 1 Tab by mouth daily. Class: Normal  
 Pharmacy: [address] [place] [address], [address] [place] [address] Ph #: [phone] Route: Oral  
 atorvastatin (LIPITOR) 20 mg tablet 1 Sig: Take 1 Tab by mouth daily. Class: Normal  
 Pharmacy: [address] [place] [address], [address] [place] [address] Ph #: [phone] Route: Oral  
 alfuzosin SR (UROXATRAL) 10 mg SR tablet 1 Sig: Take 1 Tab by mouth daily (after dinner).   
 Class: Normal  
 Pharmacy: [address], [facility] Ph #: [phone] Route: Oral  
 finasteride (PROSCAR) 5 mg tablet 1 Sig: Take 1 Tab by mouth daily. Class: Normal  
 Pharmacy: [address], [facility] Ph #: [phone] Route: Oral  
 omeprazole (PRILOSEC) 20 mg capsule 1 Sig: Take 1 Cap by mouth daily. Class: Normal  
 Pharmacy: [address], [facility] Ph #: [phone] Route: Oral  
  
To-Do List   
 10/01/2018 Lab:  TSH 3RD GENERATION   
  
 10/01/2018 Imaging:  US THYROID/PARATHYROID/SOFT TISS Patient Instructions Arthritis: Care Instructions Your Care Instructions Arthritis, also called osteoarthritis, is a breakdown of the cartilage that cushions your joints. When the cartilage wears down, your bones rub against each other. This causes pain and stiffness. Many people have some arthritis as they age. Arthritis most often affects the joints of the spine, hands, hips, knees, or feet. You can take simple measures to protect your joints, ease your pain, and help you stay active. Follow-up care is a key part of your treatment and safety. Be sure to make and go to all appointments, and call your doctor if you are having problems. It's also a good idea to know your test results and keep a list of the medicines you take. How can you care for yourself at home? · Stay at a healthy weight. Being overweight puts extra strain on your joints. · Talk to your doctor or physical therapist about exercises that will help ease joint pain. ¨ Stretch. You may enjoy gentle forms of yoga to help keep your joints and muscles flexible. ¨ Walk instead of jog. Other types of exercise that are less stressful on the joints include riding a bicycle, swimming, mikal chi, or water exercise. ¨ Lift weights. Strong muscles help reduce stress on your joints.  Stronger thigh muscles, for example, take some of the stress off of the knees and hips. Learn the right way to lift weights so you do not make joint pain worse. · Take your medicines exactly as prescribed. Call your doctor if you think you are having a problem with your medicine. · Take pain medicines exactly as directed. ¨ If the doctor gave you a prescription medicine for pain, take it as prescribed. ¨ If you are not taking a prescription pain medicine, ask your doctor if you can take an over-the-counter medicine. · Use a cane, crutch, walker, or another device if you need help to get around. These can help rest your joints. You also can use other things to make life easier, such as a higher toilet seat and padded handles on kitchen utensils. · Do not sit in low chairs, which can make it hard to get up. · Put heat or cold on your sore joints as needed. Use whichever helps you most. You also can take turns with hot and cold packs. ¨ Apply heat 2 or 3 times a day for 20 to 30 minutes-using a heating pad, hot shower, or hot pack-to relieve pain and stiffness. ¨ Put ice or a cold pack on your sore joint for 10 to 20 minutes at a time. Put a thin cloth between the ice and your skin. When should you call for help? Call your doctor now or seek immediate medical care if: 
  · You have sudden swelling, warmth, or pain in any joint.  
  · You have joint pain and a fever or rash.  
  · You have such bad pain that you cannot use a joint.  
 Watch closely for changes in your health, and be sure to contact your doctor if: 
  · You have mild joint symptoms that continue even with more than 6 weeks of care at home.  
  · You have stomach pain or other problems with your medicine. Where can you learn more? Go to http://jimena-catarino.info/. Enter P192 in the search box to learn more about \"Arthritis: Care Instructions. \" Current as of: October 10, 2017 Content Version: 11.7 © 4938-9648 Healthwise, Incorporated. Care instructions adapted under license by [employer] (which disclaims liability or warranty for this information). If you have questions about a medical condition or this instruction, always ask your healthcare professional. [address] any warranty or liability for your use of this information. Hand Arthritis: Exercises Your Care Instructions Here are some examples of exercises for hand arthritis. Start each exercise slowly. Ease off the exercise if you start to have pain. Your doctor or your physical or occupational therapist will tell you when you can start these exercises and which ones will work best for you. How to do the exercises Tendon glides 1. In this exercise, the steps follow one another to a make a continuous movement. 2. With your affected hand, point your fingers and thumb straight up. Your wrist should be relaxed, following the line of your fingers and thumb. 3. Curl your fingers so that the top two joints in them are bent, and your fingers wrap down. Your fingertips should touch or be near the base of your fingers. Your fingers will look like a hook. 4. Make a fist by bending your knuckles. Your thumb can gently rest against your index (pointing) finger. 5. Unwind your fingers slightly so that your fingertips can touch the base of your palm. Your thumb can rest against your index finger. 6. Move back to your starting position, with your fingers and thumb pointing up. 7. Repeat the series of motions 8 to 12 times. 8. Switch hands and repeat steps 1 through 6, even if only one hand is sore. Intrinsic flexion 1. Rest your affected hand on a table and bend the large joints where your fingers connect to your hand. Keep your thumb and the other joints in your fingers straight. 2. Slowly straighten your fingers. Your wrist should be relaxed, following the line of your fingers and thumb. 3. Move back to your starting position, with your hand bent. 4. Repeat 8 to 12 times. 5. Switch hands and repeat steps 1 through 4, even if only one hand is sore. Finger extension 1. Place your affected hand flat on a table. 2. Lift and then lower one finger at a time off the table. 3. Repeat 8 to 12 times. 4. Switch hands and repeat steps 1 through 3, even if only one hand is sore. MP extension 1. Place your good hand on a table, palm up. Put your affected hand on top of your good hand with your fingers wrapped around the thumb of your good hand like you are making a fist. 
2. Slowly uncurl the joints of your affected hand where your fingers connect to your hand so that only the top two joints of your fingers are bent. Your fingers will look like a hook. 3. Move back to your starting position, with your fingers wrapped around your good thumb. 4. Repeat 8 to 12 times. 5. Switch hands and repeat steps 1 through 4, even if only one hand is sore. PIP extension (with MP extension) 1. Place your good hand on a table, palm up. Put your affected hand on top of your good hand, palm up. 2. Use the thumb and fingers of your good hand to grasp below the middle joint of one finger of your affected hand. 3. Straighten the last two joints of that finger. 4. Repeat 8 to 12 times. 5. Repeat steps 1 through 4 with each finger. 6. Switch hands and repeat steps 1 through 5, even if only one hand is sore. DIP flexion 1. With your good hand, grasp one finger of your affected hand. Your thumb will be on the top side of your finger just below the joint that is closest to your fingernail. 2. Slowly bend your affected finger only at the joint closest to your fingernail. 3. Repeat 8 to 12 times. 4. Repeat steps 1 through 3 with each finger. 5. Switch hands and repeat steps 1 through 4, even if only one hand is sore. Follow-up care is a key part of your treatment and safety.  Be sure to make and go to all appointments, and call your doctor if you are having problems. It's also a good idea to know your test results and keep a list of the medicines you take. Where can you learn more? Go to http://jimena-catarino.info/. Enter T172 in the search box to learn more about \"Hand Arthritis: Exercises. \" Current as of: November 29, 2017 Content Version: 11.7 © 5548-0799 [employer]. Care instructions adapted under license by [employer] (which disclaims liability or warranty for this information). If you have questions about a medical condition or this instruction, always ask your healthcare professional. [address] any warranty or liability for your use of this information. Benign Prostatic Hyperplasia: Care Instructions Your Care Instructions Benign prostatic hyperplasia, or BPH, is an enlarged prostate gland. The prostate is a small gland that makes some of the fluid in semen. Prostate enlargement happens to almost all men as they age. It is usually not serious. BPH does not cause prostate cancer. As the prostate gets bigger, it may partly block the flow of urine. You may have a hard time getting a urine stream started or completely stopped. BPH can cause dribbling. You may have a weak urine stream, or you may have to urinate more often than you used to, especially at night. Most men find these problems easy to manage. You do not need treatment unless your symptoms bother you a lot or you have other problems, such as bladder infections or stones. In these cases, medicines may help. Surgery is not needed unless the urine flow is blocked or the symptoms do not get better with medicine. Follow-up care is a key part of your treatment and safety. Be sure to make and go to all appointments, and call your doctor if you are having problems. It's also a good idea to know your test results and keep a list of the medicines you take. How can you care for yourself at home? · Take plenty of time to urinate. Try to relax. · Try \"double voiding. \" Urinate as much you can, relax for a few moments, and then try to urinate again. · Sit on the toilet to urinate. · Read or think of other things while you are waiting. · Turn on a faucet, or try to picture running water. Some men find that this helps get their urine flowing. · If dribbling is a problem, wash your penis daily to avoid skin irritation and infection. · Avoid caffeine and alcohol. These drinks will increase how often you need to urinate. Spread your fluid intake throughout the day. If the urge to urinate often wakes you at night, limit your fluid intake in the evening. Urinate right before you go to bed. · Many over-the-counter cold and allergy medicines can make the symptoms of BPH worse. Avoid antihistamines, decongestants, and allergy pills, if you can. Read the warnings on the package. · If you take any prescription medicines, especially tranquilizers or antidepressants, ask your doctor or pharmacist whether they can cause urination problems. There may be other medicines you can use that do not cause urinary problems. · Be safe with medicines. Take your medicines exactly as prescribed. Call your doctor if you think you are having a problem with your medicine. When should you call for help? Call your doctor now or seek immediate medical care if: 
  · You cannot urinate at all.  
  · You have symptoms of a urinary infection. For example: ¨ You have blood or pus in your urine. ¨ You have pain in your back just below your rib cage. This is called flank pain. ¨ You have a fever, chills, or body aches. ¨ It hurts to urinate. ¨ You have groin or belly pain.  
 Watch closely for changes in your health, and be sure to contact your doctor if: 
  · It hurts when you ejaculate.  
  · Your urinary problems get a lot worse or bother you a lot. Where can you learn more? Go to http://jimena-catarino.info/. Enter K260 in the search box to learn more about \"Benign Prostatic Hyperplasia: Care Instructions. \" Current as of: December 3, 2017 Content Version: 11.7 © 2625-6836 [employer]. Care instructions adapted under license by [employer] (which disclaims liability or warranty for this information). If you have questions about a medical condition or this instruction, always ask your healthcare professional. [address] any warranty or liability for your use of this information. Gastroesophageal Reflux Disease (GERD): Care Instructions Your Care Instructions Gastroesophageal reflux disease (GERD) is the backward flow of stomach acid into the esophagus. The esophagus is the tube that leads from your throat to your stomach. A one-way valve prevents the stomach acid from moving up into this tube. When you have GERD, this valve does not close tightly enough. If you have mild GERD symptoms including heartburn, you may be able to control the problem with antacids or over-the-counter medicine. Changing your diet, losing weight, and making other lifestyle changes can also help reduce symptoms. Follow-up care is a key part of your treatment and safety. Be sure to make and go to all appointments, and call your doctor if you are having problems. It's also a good idea to know your test results and keep a list of the medicines you take. How can you care for yourself at home? · Take your medicines exactly as prescribed. Call your doctor if you think you are having a problem with your medicine. · Your doctor may recommend over-the-counter medicine. For mild or occasional indigestion, antacids, such as Tums, Gaviscon, Mylanta, or Maalox, may help. Your doctor also may recommend over-the-counter acid reducers, such as Pepcid AC, Tagamet HB, Zantac 75, or Prilosec.  Read and follow all instructions on the label. If you use these medicines often, talk with your doctor. · Change your eating habits. ¨ It's best to eat several small meals instead of two or three large meals. ¨ After you eat, wait 2 to 3 hours before you lie down. ¨ Chocolate, mint, and alcohol can make GERD worse. ¨ Spicy foods, foods that have a lot of acid (like tomatoes and oranges), and coffee can make GERD symptoms worse in some people. If your symptoms are worse after you eat a certain food, you may want to stop eating that food to see if your symptoms get better. · Do not smoke or chew tobacco. Smoking can make GERD worse. If you need help quitting, talk to your doctor about stop-smoking programs and medicines. These can increase your chances of quitting for good. · If you have GERD symptoms at night, raise the head of your bed 6 to 8 inches by putting the frame on blocks or placing a foam wedge under the head of your mattress. (Adding extra pillows does not work.) · Do not wear tight clothing around your middle. · Lose weight if you need to. Losing just 5 to 10 pounds can help. When should you call for help? Call your doctor now or seek immediate medical care if: 
  · You have new or different belly pain.  
  · Your stools are black and tarlike or have streaks of blood.  
 Watch closely for changes in your health, and be sure to contact your doctor if: 
  · Your symptoms have not improved after 2 days.  
  · Food seems to catch in your throat or chest.  
Where can you learn more? Go to http://jimena-catarino.info/. Enter X238 in the search box to learn more about \"Gastroesophageal Reflux Disease (GERD): Care Instructions. \" Current as of: May 12, 2017 Content Version: 11.7 © 3518-2199 [employer] Technologies. Care instructions adapted under license by [employer] (which disclaims liability or warranty for this information).  If you have questions about a medical condition or this instruction, always ask your healthcare professional. [address] any warranty or liability for your use of this information. Low Sodium Diet (2,000 Milligram): Care Instructions Your Care Instructions Too much sodium causes your body to hold on to extra water. This can raise your blood pressure and force your heart and kidneys to work harder. In very serious cases, this could cause you to be put in the hospital. It might even be life-threatening. By limiting sodium, you will feel better and lower your risk of serious problems. The most common source of sodium is salt. People get most of the salt in their diet from canned, prepared, and packaged foods. Fast food and restaurant meals also are very high in sodium. Your doctor will probably limit your sodium to less than 2,000 milligrams (mg) a day. This limit counts all the sodium in prepared and packaged foods and any salt you add to your food. Follow-up care is a key part of your treatment and safety. Be sure to make and go to all appointments, and call your doctor if you are having problems. It's also a good idea to know your test results and keep a list of the medicines you take. How can you care for yourself at home? Read food labels · Read labels on cans and food packages. The labels tell you how much sodium is in each serving. Make sure that you look at the serving size. If you eat more than the serving size, you have eaten more sodium. · Food labels also tell you the Percent Daily Value for sodium. Choose products with low Percent Daily Values for sodium. · Be aware that sodium can come in forms other than salt, including monosodium glutamate (MSG), sodium citrate, and sodium bicarbonate (baking soda). MSG is often added to Asian food. When you eat out, you can sometimes ask for food without MSG or added salt. Buy low-sodium foods · Buy foods that are labeled \"unsalted\" (no salt added), \"sodium-free\" (less than 5 mg of sodium per serving), or \"low-sodium\" (less than 140 mg of sodium per serving). Foods labeled \"reduced-sodium\" and \"light sodium\" may still have too much sodium. Be sure to read the label to see how much sodium you are getting. · Buy fresh vegetables, or frozen vegetables without added sauces. Buy low-sodium versions of canned vegetables, soups, and other canned goods. Prepare low-sodium meals · Cut back on the amount of salt you use in cooking. This will help you adjust to the taste. Do not add salt after cooking. One teaspoon of salt has about 2,300 mg of sodium. · Take the salt shaker off the table. · Flavor your food with garlic, lemon juice, onion, vinegar, herbs, and spices. Do not use soy sauce, lite soy sauce, steak sauce, onion salt, garlic salt, celery salt, mustard, or ketchup on your food. · Use low-sodium salad dressings, sauces, and ketchup. Or make your own salad dressings and sauces without adding salt. · Use less salt (or none) when recipes call for it. You can often use half the salt a recipe calls for without losing flavor. Other foods such as rice, pasta, and grains do not need added salt. · Rinse canned vegetables, and cook them in fresh water. This removes some-but not all-of the salt. · Avoid water that is naturally high in sodium or that has been treated with water softeners, which add sodium. Call your local water company to find out the sodium content of your water supply. If you buy bottled water, read the label and choose a sodium-free brand. Avoid high-sodium foods · Avoid eating: ¨ Smoked, cured, salted, and canned meat, fish, and poultry. ¨ Ham, maravilla, hot dogs, and luncheon meats. ¨ Regular, hard, and processed cheese and regular peanut butter. ¨ Crackers with salted tops, and other salted snack foods such as pretzels, chips, and salted popcorn. ¨ Frozen prepared meals, unless labeled low-sodium. ¨ Canned and dried soups, broths, and bouillon, unless labeled sodium-free or low-sodium. ¨ Canned vegetables, unless labeled sodium-free or low-sodium. ¨ [place] fries, pizza, tacos, and other fast foods. ¨ Pickles, olives, ketchup, and other condiments, especially soy sauce, unless labeled sodium-free or low-sodium. Where can you learn more? Go to http://jimena-catarino.info/. Enter H856 in the search box to learn more about \"Low Sodium Diet (2,000 Milligram): Care Instructions. \" Current as of: May 12, 2017 Content Version: 11.7 © 2021-7738 [employer] app. Care instructions adapted under license by [employer] (which disclaims liability or warranty for this information). If you have questions about a medical condition or this instruction, always ask your healthcare professional. [place] [address] any warranty or liability for your use of this information. Introducing [facility]! Dear Albaro Lanza: 
Thank you for requesting a [employer] account. Our records indicate that you already have an active [employer] account. You can access your account anytime at [email or web address] Did you know that you can access your hospital and ER discharge instructions at any time in [employer]? You can also review all of your test results from your hospital stay or ER visit. Additional Information If you have questions, please visit the Frequently Asked Questions section of the [employer] website at [email or web address] Remember, [employer] is NOT to be used for urgent needs. For medical emergencies, dial 911. Now available from your iPhone and Android! Please provide this summary of care documentation to your next provider. Your primary care clinician is listed as Sinan Nguyen. If you have any questions after today's visit, please call [phone].

## 2018-10-04 ENCOUNTER — HOSPITAL ENCOUNTER (OUTPATIENT)
Dept: LAB | Age: 73
Discharge: HOME | End: 2018-10-04
Payer: MEDICARE

## 2018-10-04 DIAGNOSIS — E04.9 ENLARGED THYROID GLAND: ICD-10-CM

## 2018-10-04 LAB — TSH SERPL DL<=0.05 MIU/L-ACNC: 1.37 UIU/ML (ref 0.36–3.74)

## 2018-10-04 PROCEDURE — 84443 ASSAY THYROID STIM HORMONE: CPT | Performed by: FAMILY MEDICINE

## 2018-10-04 PROCEDURE — 36415 COLL VENOUS BLD VENIPUNCTURE: CPT | Performed by: FAMILY MEDICINE

## 2018-10-08 ENCOUNTER — HOSPITAL ENCOUNTER (OUTPATIENT)
Dept: ULTRASOUND IMAGING | Age: 73
Discharge: HOME | End: 2018-10-08
Attending: FAMILY MEDICINE
Payer: MEDICARE

## 2018-10-08 DIAGNOSIS — E04.9 ENLARGED THYROID GLAND: ICD-10-CM

## 2018-10-08 PROCEDURE — 76536 US EXAM OF HEAD AND NECK: CPT

## 2018-10-18 NOTE — PROGRESS NOTES
Called and spoke with patient's wife (on [address] [place] [address]) and she verified 2 identifiers.  Patient's wife verbalized understanding of normal thyroid ultrasound

## 2018-10-18 NOTE — PROGRESS NOTES
Called and spoke with patient's wife (on [address] [place] [address]) and she verified 2 identifiers. Patient's wife verbalized understanding of normal TSH.

## 2018-11-16 RX ORDER — OMEPRAZOLE 20 MG/1
20 CAPSULE, DELAYED RELEASE ORAL DAILY
Qty: 90 CAP | Refills: 1 | Status: SHIPPED | OUTPATIENT
Start: 2018-11-16 | End: 2019-04-15 | Stop reason: SDUPTHER

## 2018-11-16 RX ORDER — FINASTERIDE 5 MG/1
5 TABLET, FILM COATED ORAL DAILY
Qty: 90 TAB | Refills: 1 | Status: SHIPPED | OUTPATIENT
Start: 2018-11-16 | End: 2019-02-19 | Stop reason: SDUPTHER

## 2018-11-16 RX ORDER — ALFUZOSIN HYDROCHLORIDE 10 MG/1
10 TABLET, EXTENDED RELEASE ORAL
Qty: 90 TAB | Refills: 1 | Status: SHIPPED | OUTPATIENT
Start: 2018-11-16 | End: 2019-06-20 | Stop reason: SDUPTHER

## 2018-11-16 RX ORDER — LISINOPRIL 10 MG/1
10 TABLET ORAL DAILY
Qty: 90 TAB | Refills: 1 | Status: SHIPPED | OUTPATIENT
Start: 2018-11-16 | End: 2019-05-17 | Stop reason: SDUPTHER

## 2018-11-16 RX ORDER — ATORVASTATIN CALCIUM 20 MG/1
20 TABLET, FILM COATED ORAL DAILY
Qty: 90 TAB | Refills: 1 | Status: SHIPPED | OUTPATIENT
Start: 2018-11-16 | End: 2019-05-17 | Stop reason: SDUPTHER

## 2018-12-03 ENCOUNTER — OFFICE VISIT (OUTPATIENT)
Dept: FAMILY MEDICINE CLINIC | Age: 73
End: 2018-12-03

## 2018-12-03 VITALS
OXYGEN SATURATION: 98 % | HEART RATE: 67 BPM | BODY MASS INDEX: 23.22 KG/M2 | TEMPERATURE: 98.4 F | RESPIRATION RATE: 16 BRPM | HEIGHT: 68 IN | SYSTOLIC BLOOD PRESSURE: 116 MMHG | WEIGHT: 153.2 LBS | DIASTOLIC BLOOD PRESSURE: 60 MMHG

## 2018-12-03 DIAGNOSIS — E78.5 HYPERLIPIDEMIA, UNSPECIFIED HYPERLIPIDEMIA TYPE: ICD-10-CM

## 2018-12-03 DIAGNOSIS — R35.0 URINE FREQUENCY: ICD-10-CM

## 2018-12-03 DIAGNOSIS — M19.90 ARTHRITIS: ICD-10-CM

## 2018-12-03 DIAGNOSIS — I10 ESSENTIAL HYPERTENSION: ICD-10-CM

## 2018-12-03 DIAGNOSIS — H91.93 HEARING PROBLEM OF BOTH EARS: ICD-10-CM

## 2018-12-03 DIAGNOSIS — N41.0 ACUTE PROSTATITIS: Primary | ICD-10-CM

## 2018-12-03 LAB
BILIRUB UR QL STRIP: NEGATIVE
GLUCOSE UR-MCNC: NEGATIVE MG/DL
KETONES P FAST UR STRIP-MCNC: NEGATIVE MG/DL
PH UR STRIP: 5.5 [PH] (ref 4.6–8)
PROT UR QL STRIP: NEGATIVE
SP GR UR STRIP: 1.02 (ref 1–1.03)
UA UROBILINOGEN AMB POC: NORMAL (ref 0.2–1)
URINALYSIS CLARITY POC: CLEAR
URINALYSIS COLOR POC: YELLOW
URINE BLOOD POC: NEGATIVE
URINE LEUKOCYTES POC: NEGATIVE
URINE NITRITES POC: NEGATIVE

## 2018-12-03 RX ORDER — CHOLECALCIFEROL (VITAMIN D3) 125 MCG
CAPSULE ORAL
COMMUNITY
End: 2019-06-20

## 2018-12-03 NOTE — PROGRESS NOTES
HISTORY OF PRESENT ILLNESS  Guillaume Branch is a 72 y.o. male. HPI : recently establish care. Multiple medical problem. H/o hypertension. On medication. Vitals been stable. Denies any headache, dizziness, no chest pain or sob. No palpitation. No diaphoresis. Has h/o BPH. Following urology. currently feeling that he is having prostatitis. Has prior h.o prostatitis. Following urology. Has not contacted them yet. UA showed no signs of infection. ? Frequency of urination. Denies any nausea or vomiting. No abdominal pain. No pain with bowel movement. Generalize pain around prostate site. No fever. Visit Vitals  /60 (BP 1 Location: Left arm, BP Patient Position: Sitting)   Pulse 67   Temp 98.4 °F (36.9 °C) (Oral)   Resp 16   Ht 5' 8.25\" (1.734 m)   Wt 153 lb 3.2 oz (69.5 kg)   SpO2 98%   BMI 23.12 kg/m²     Review medication list, vitals, problem list,allergies. H/o hyperlipidemia. On statin. No side effects. Working on diet modification. Also multiple site arthritic pain. Negative work up in the past for RA. Review labs from care everywhere. Currently on symptomatic treatment. Wearing hearing aid. Following ENT as needed. ROS: see HPI     Physical Exam   Constitutional: He is oriented to person, place, and time. No distress. Cardiovascular: Normal heart sounds. Pulmonary/Chest: No respiratory distress. He has no wheezes. Abdominal: Soft. There is no tenderness. Musculoskeletal: He exhibits no edema. Neurological: He is oriented to person, place, and time. Psychiatric: His behavior is normal.       ASSESSMENT and PLAN    ICD-10-CM ICD-9-CM    1. Acute prostatitis: prostate exam not done. UA negative for any infection. For now he is going to contact his urologist office today and follow their recommendations. Discussed importance of following specialist and prolong antibiotic need if it is clinically proven. He agreed to contact their office. N41.0 601.0    2.  Urine frequency: UA negative for infection. R35.0 788.41 AMB POC URINALYSIS DIP STICK AUTO W/ MICRO   3. Essential hypertension: stable at this time. Low salt diet. Exercise as tolerated. Will continue current plan. L91 722.5 METABOLIC PANEL, COMPREHENSIVE   4. Hyperlipidemia, unspecified hyperlipidemia type: on statin. No side effects. Diet modification,  E78.5 272.4 LIPID PANEL   5. Arthritis: symptomatic treatment. M19.90 716.90     multiple site including hand joints. work up for [employer] , [place] negative. review through care everywhere. 6. Hearing problem of both ears H91.93 V41.2     has hearing aid. [facility].     Pt understood and agree with the plan   Review hM   Follow-up Disposition: Not on File

## 2018-12-03 NOTE — PATIENT INSTRUCTIONS
Prostatitis: Care Instructions  Your Care Instructions    The prostate gland is a small, walnut-shaped organ. It lies just below a man's bladder. It surrounds the urethra, the tube that carries urine through the penis and out of the body. Prostatitis is a painful condition caused by inflammation or infection of the prostate gland. Sometimes the condition is caused by bacteria, but often the cause is not known. Prostatitis caused by bacteria usually is treated with self-care and antibiotics. Follow-up care is a key part of your treatment and safety. Be sure to make and go to all appointments, and call your doctor if you are having problems. It's also a good idea to know your test results and keep a list of the medicines you take. How can you care for yourself at home? · If your doctor prescribed antibiotics, take them as directed. Do not stop taking them just because you feel better. You need to take the full course of antibiotics. · Take an over-the-counter pain medicine, such as acetaminophen (Tylenol), ibuprofen (Advil, Motrin), or naproxen (Aleve). Be safe with medicines. Read and follow all instructions on the label. · Take warm baths to help soothe pain. · Straining to pass stools can hurt when your prostate is inflamed. Avoid constipation. ? Include fruits, vegetables, beans, and whole grains in your diet each day. These foods are high in fiber. ? Drink plenty of fluids, enough so that your urine is light yellow or clear like water. If you have kidney, heart, or liver disease and have to limit fluids, talk with your doctor before you increase the amount of fluids you drink. ? Get some exercise every day. Build up slowly to 30 to 60 minutes a day on 5 or more days of the week. ? Take a fiber supplement, such as Citrucel or Metamucil, every day if needed. Read and follow all instructions on the label. ? Schedule time each day for a bowel movement. Having a daily routine may help.  Take your time and do not strain when having a bowel movement. · Avoid alcohol, caffeine, and spicy foods, especially if they make your symptoms worse. When should you call for help? Call your doctor now or seek immediate medical care if:    · You have symptoms of a urinary tract infection. These may include:  ? Pain or burning when you urinate. ? A frequent need to urinate without being able to pass much urine. ? Pain in the flank, which is just below the rib cage and above the waist on either side of the back. ? Blood in your urine. ? A fever.    Watch closely for changes in your health, and be sure to contact your doctor if:    · You cannot empty your bladder completely.     · You do not get better as expected. Where can you learn more? Go to http://jimena-catarino.info/. Enter Z316 in the search box to learn more about \"Prostatitis: Care Instructions. \"  Current as of: December 3, 2017  Content Version: 11.8  © 6448-7175 [employer]. Care instructions adapted under license by [employer] (which disclaims liability or warranty for this information). If you have questions about a medical condition or this instruction, always ask your healthcare professional. [place] [address] any warranty or liability for your use of this information. Learning About Low-Fat Eating  What is low-fat eating? Most food has some fat in it. Your body needs some fat to be healthy. But some kinds of fats are healthier than others. In a low-fat eating plan, you try to choose healthier fats and eat fewer unhealthy fats. Healthy fats include olive and canola oil. Try to avoid eating too much saturated fat (such as in cheese and meats) and trans fat (a type of fat found in many packaged snack foods and other baked goods). You do not need to cut all fat from your diet. But you can make healthier choices about the types and amount of fat you eat.   Even though it is a good idea to choose healthier fats, it is still important to be careful of how much fat you eat, because all fats are high in calories. What are the different types of fats? Unhealthy fats  · Saturated fat. Saturated fats are mostly in animal foods, such as meat and dairy foods. Tropical oils, such as coconut oil, palm oil, and cocoa butter, are also saturated fats. · Trans fat. Trans fats include shortening, partially hydrogenated vegetable oils, and hydrogenated vegetable oils. Trans fats are made when a liquid fat is turned into a solid fat (for example, when corn oil is made into stick margarine). They are in many processed foods, such as cookies, crackers, and snack foods. Healthy fats  · Monounsaturated fat. Monounsaturated fats are liquid at room temperature but get solid when refrigerated. Eating foods that are high in this fat may help lower your \"bad\" (LDL) cholesterol, keep your \"good\" (HDL) cholesterol level up, and lower your chances of getting coronary artery disease. This fat is found in canola oil, olive oil, peanut oil, olives, avocados, nuts, and nut butters. · Polyunsaturated fat. Polyunsaturated fats are liquid at room temperature. They are in safflower, sunflower, and corn oils. They are also the main fat in seafood. Omega-3 fatty acids are types of polyunsaturated fat. Eating fish may lower your chances of getting coronary artery disease. Fatty fish such as salmon and mackerel contain these healthy fatty acids. So do ground flaxseeds and flaxseed oil, soybeans, walnuts, and seeds. Why cut down on unhealthy fats? Eating foods that contain saturated fats can raise the LDL (\"bad\") cholesterol in your blood. Having a high level of LDL cholesterol increases your chance of hardening of the arteries (atherosclerosis), which can lead to heart disease, heart attack, and stroke. Trans fat raises the level of \"bad\" LDL cholesterol in your blood and may lower the \"good\" HDL cholesterol in your blood.  Trans fat can raise your risk of heart disease, heart attack, and stroke. In general:  · No more than 10% of your daily calories should come from saturated fat. This is about 20 grams in a 2,000-calorie diet. · No more than 10% of your daily calories should come from polyunsaturated fat. This is about 20 grams in a 2,000-calorie diet. · Monounsaturated fats can be up to 15% of your daily calories. This is about 25 to 30 grams in a 2,000-calorie diet. If you're not sure how much fat you should be eating or how many calories you need each day to stay at a healthy weight, talk to a registered dietitian. He or she can help you create a plan that's right for you. What can you do to cut down on fat? Foods like cheese, butter, sausage, and desserts can have a lot of unhealthy fats. Try these tips for healthier meals at home and when you eat out. At home  · Fill up on fruits, vegetables, and whole grains. · Think of meat as a side dish instead of as the main part of your meal.  · When you do eat meat, make it extra-lean ground beef (97% lean), ground turkey breast (without skin added), meats with fat trimmed off before cooking, or skinless chicken. · Try main dishes that use whole wheat pasta, brown rice, dried beans, or vegetables. · Use cooking methods that use little or no fat, such as broiling, steaming, or grilling. Use cooking spray instead of oil. If you use oil, use a monounsaturated oil, such as canola or olive oil. · Read food labels on canned, bottled, or packaged foods. Choose those with little saturated fat and no trans fat. When eating out at a restaurant  · Order foods that are broiled or poached instead of fried or breaded. · Cut back on the amount of butter or margarine that you use on bread. Use small amounts of olive oil instead. · Order sauces, gravies, and salad dressings on the side, and use only a little. · When you order pasta, choose tomato sauce instead of cream sauce.   · Ask for salsa with your baked potato instead of sour cream, butter, cheese, or maravilla. Where can you learn more? Go to http://jimena-catarino.info/. Enter S063 in the search box to learn more about \"Learning About Low-Fat Eating. \"  Current as of: March 29, 2018  Content Version: 11.8  © 2776-9474 [employer], [employer]. Care instructions adapted under license by [employer] (which disclaims liability or warranty for this information). If you have questions about a medical condition or this instruction, always ask your healthcare professional. [place] [address] any warranty or liability for your use of this information.

## 2018-12-17 ENCOUNTER — OFFICE VISIT (OUTPATIENT)
Dept: UROLOGY | Age: 73
End: 2018-12-17

## 2018-12-17 VITALS
HEIGHT: 68 IN | BODY MASS INDEX: 23.19 KG/M2 | HEART RATE: 82 BPM | DIASTOLIC BLOOD PRESSURE: 56 MMHG | SYSTOLIC BLOOD PRESSURE: 116 MMHG | WEIGHT: 153 LBS | OXYGEN SATURATION: 95 %

## 2018-12-17 DIAGNOSIS — N41.9 PROSTATITIS, UNSPECIFIED PROSTATITIS TYPE: Primary | ICD-10-CM

## 2018-12-17 DIAGNOSIS — N13.8 BPH WITH OBSTRUCTION/LOWER URINARY TRACT SYMPTOMS: ICD-10-CM

## 2018-12-17 DIAGNOSIS — N40.1 BPH WITH OBSTRUCTION/LOWER URINARY TRACT SYMPTOMS: ICD-10-CM

## 2018-12-17 LAB
BILIRUB UR QL STRIP: NEGATIVE
GLUCOSE UR-MCNC: NEGATIVE MG/DL
KETONES P FAST UR STRIP-MCNC: NEGATIVE MG/DL
PH UR STRIP: 5.5 [PH] (ref 4.6–8)
PROT UR QL STRIP: NEGATIVE
SP GR UR STRIP: 1.02 (ref 1–1.03)
UA UROBILINOGEN AMB POC: NORMAL (ref 0.2–1)
URINALYSIS CLARITY POC: CLEAR
URINALYSIS COLOR POC: YELLOW
URINE BLOOD POC: NORMAL
URINE LEUKOCYTES POC: NEGATIVE
URINE NITRITES POC: NEGATIVE

## 2018-12-17 NOTE — PROGRESS NOTES
Chief Complaint   Patient presents with    New Patient    Prostatitis    Groin Pain    Testicle Pain     Left       HISTORY OF PRESENT ILLNESS:  Zohaib Simental is a 72 y.o. male who presents today with prostate symptoms going back for a number of years. He has been treated for symptoms of prostatitis in the past but has not been on any antibiotics for about 6 years now. His current level of symptoms is that he has some perineal discomfort and occasional left testicular pain primarily since his hernia repair on the left. He does not have any testicular swelling and it really does not bother him very much but he just noticed it. The perineal discomfort has been going on for a number of years. He currently is taking finasteride and Uroxatrol and is been on both for several years. His previous urologist have been with [facility] but both have moved away to different sites and he comes here now for further urologic follow-up. He has had a prostate biopsy years ago which he said was benign. He had one episode of hematospermia earlier this year but has had no other since then. Currently he is concerned about whether he has prostatitis or not and was referred for further evaluation of that. He currently does not have any dysuria or urinary frequency. He has nocturia x1 or maybe 2 at the most.  He rarely has any type of ejaculation and is having some problems with erections at this time also.            ROS documented    Past Medical History:   Diagnosis Date    Basal cell carcinoma     BPH (benign prostatic hyperplasia)     Cancer (HCC)     Basal Cell Carcinoma    Depression     Gastrointestinal disorder     Diverticulitis    GERD (gastroesophageal reflux disease)     Inguinal hernia     left side    Other ill-defined conditions(239.96)     BPH    Psychiatric disorder     Aggression, Depression       Past Surgical History:   Procedure Laterality Date    HX HERNIA REPAIR  03/14/2017    HX MALIGNANT SKIN LESION EXCISION         Social History     Tobacco Use    Smoking status: Former Smoker    Smokeless tobacco: Never Used    Tobacco comment: quit 1971   Substance Use Topics    Alcohol use: Yes     Alcohol/week: 1.0 oz     Types: 2 Cans of beer per week     Comment: wine 2-3 x per week with dinner    Drug use: No       Allergies   Allergen Reactions    Naproxen Unable to Obtain     Patient states that he is not allergic. Family History   Problem Relation Age of Onset    Prostate Cancer Father        Current Outpatient Medications   Medication Sig Dispense Refill    lisinopril (PRINIVIL, ZESTRIL) 10 mg tablet Take 1 Tab by mouth daily. 90 Tab 1    atorvastatin (LIPITOR) 20 mg tablet Take 1 Tab by mouth daily. 90 Tab 1    alfuzosin SR (UROXATRAL) 10 mg SR tablet Take 1 Tab by mouth daily (after dinner). 90 Tab 1    finasteride (PROSCAR) 5 mg tablet Take 1 Tab by mouth daily. 90 Tab 1    omeprazole (PRILOSEC) 20 mg capsule Take 1 Cap by mouth daily. 90 Cap 1    naproxen sodium (ALEVE) 220 mg cap Take  by mouth.  MELATONIN PO Take 10 mg by mouth as needed.  acetaminophen (TYLENOL) 500 mg tablet Take  by mouth every six (6) hours as needed for Pain. PHYSICAL EXAMINATION:   Visit Vitals  /56 (BP 1 Location: Left arm, BP Patient Position: Sitting)   Pulse 82   Ht 5' 8.25\" (1.734 m)   Wt 153 lb (69.4 kg)   SpO2 95%   BMI 23.09 kg/m²     Constitutional: WDWN, Pleasant and appropriate affect, No acute distress. CV:  No peripheral swelling noted  Respiratory: No respiratory distress or difficulties  Abdomen:  No abdominal masses or tenderness. No CVA tenderness. No inguinal hernias noted.  Male:    SAMMIE:Perineum normal to visual inspection, no erythema or irritation, Sphincter with good tone, no rectal lesions, the prostate itself is probably 80 g or so in size and is somewhat boggy and a little bit tender.   I think this probably goes along with some chronic prostatitis which is a common pathologic biopsy finding. I do not feel any evidence of acute prostatitis. There are no nodules or induration of tiny bit of prostate fluid was expressed during the digital rectal exam.    SCROTUM:  No scrotal rash or lesions noticed. Normal bilateral testes and epididymis. The left testicle is entirely normal with no evidence of induration or tenderness. PENIS: Urethral meatus normal in location and size. No urethral discharge. Skin: No jaundice. Neuro/Psych:  Alert and oriented x 3, affect appropriate. Lymphatic:   No enlarged inguinal lymph nodes. Results for orders placed or performed in visit on 12/17/18   AMB POC URINALYSIS DIP STICK AUTO W/O MICRO   Result Value Ref Range    Color (UA POC) Yellow     Clarity (UA POC) Clear     Glucose (UA POC) Negative Negative    Bilirubin (UA POC) Negative Negative    Ketones (UA POC) Negative Negative    Specific gravity (UA POC) 1.020 1.001 - 1.035    Blood (UA POC) Trace Negative    pH (UA POC) 5.5 4.6 - 8.0    Protein (UA POC) Negative Negative    Urobilinogen (UA POC) 0.2 mg/dL 0.2 - 1    Nitrites (UA POC) Negative Negative    Leukocyte esterase (UA POC) Negative Negative     A bladder scan shows a little bit less than 30 cc of post void residual.    REVIEW OF LABS AND IMAGING:     Imaging Report Reviewed? YES     Images Reviewed? YES           Other Lab Data Reviewed? YES         ASSESSMENT:     ICD-10-CM ICD-9-CM    1. Prostatitis, unspecified prostatitis type N41.9 601.9 AMB POC URINALYSIS DIP STICK AUTO W/O MICRO   2. BPH with obstruction/lower urinary tract symptoms N40.1 600.01     N13.8 599.69             PLAN / DISCUSSION: : I think he does have some mild chronic prostatitis but I do not think this is anything that needs antibiotic therapy at this time. He does have a very large gland and I suspect that accounts for all of his prostate symptoms including his perineal discomfort from time to time. Currently however his PSA is acceptable and I do not feel any suspicion of malignancy in the prostate gland itself. I think at this point in time my recommendation would be to simply keep him on the current medications he is taking, specifically Uroxatrol and finasteride. Hematospermia from earlier this year I think is not clinically significant and I would simply follow that expectantly also. Return here in 1 year for follow-up PSA and SAMMIE. The patient expresses understanding and agreement of the discussion and plan. Rosamaria Gentile MD on 12/17/2018         Please note: This document has been produced using voice recognition software. Unrecognized errors in transcription may be present.

## 2018-12-17 NOTE — PATIENT INSTRUCTIONS
Prostatitis: Care Instructions  Your Care Instructions    The prostate gland is a small, walnut-shaped organ. It lies just below a man's bladder. It surrounds the urethra, the tube that carries urine through the penis and out of the body. Prostatitis is a painful condition caused by inflammation or infection of the prostate gland. Sometimes the condition is caused by bacteria, but often the cause is not known. Prostatitis caused by bacteria usually is treated with self-care and antibiotics. Follow-up care is a key part of your treatment and safety. Be sure to make and go to all appointments, and call your doctor if you are having problems. It's also a good idea to know your test results and keep a list of the medicines you take. How can you care for yourself at home? · If your doctor prescribed antibiotics, take them as directed. Do not stop taking them just because you feel better. You need to take the full course of antibiotics. · Take an over-the-counter pain medicine, such as acetaminophen (Tylenol), ibuprofen (Advil, Motrin), or naproxen (Aleve). Be safe with medicines. Read and follow all instructions on the label. · Take warm baths to help soothe pain. · Straining to pass stools can hurt when your prostate is inflamed. Avoid constipation. ? Include fruits, vegetables, beans, and whole grains in your diet each day. These foods are high in fiber. ? Drink plenty of fluids, enough so that your urine is light yellow or clear like water. If you have kidney, heart, or liver disease and have to limit fluids, talk with your doctor before you increase the amount of fluids you drink. ? Get some exercise every day. Build up slowly to 30 to 60 minutes a day on 5 or more days of the week. ? Take a fiber supplement, such as Citrucel or Metamucil, every day if needed. Read and follow all instructions on the label. ? Schedule time each day for a bowel movement. Having a daily routine may help.  Take your time and do not strain when having a bowel movement. · Avoid alcohol, caffeine, and spicy foods, especially if they make your symptoms worse. When should you call for help? Call your doctor now or seek immediate medical care if:    · You have symptoms of a urinary tract infection. These may include:  ? Pain or burning when you urinate. ? A frequent need to urinate without being able to pass much urine. ? Pain in the flank, which is just below the rib cage and above the waist on either side of the back. ? Blood in your urine. ? A fever.    Watch closely for changes in your health, and be sure to contact your doctor if:    · You cannot empty your bladder completely.     · You do not get better as expected. Where can you learn more? Go to http://jimena-catarino.info/. Enter C406 in the search box to learn more about \"Prostatitis: Care Instructions. \"  Current as of: December 3, 2017  Content Version: 11.8  © 1439-0181 [employer]. Care instructions adapted under license by [employer] (which disclaims liability or warranty for this information). If you have questions about a medical condition or this instruction, always ask your healthcare professional. [address] any warranty or liability for your use of this information.

## 2018-12-17 NOTE — PROGRESS NOTES
Mr. Sowmya Morrison has a reminder for a \"due or due soon\" health maintenance. I have asked that he contact his primary care provider for follow-up on this health maintenance.

## 2019-02-01 ENCOUNTER — HOSPITAL ENCOUNTER (OUTPATIENT)
Dept: LAB | Age: 74
Discharge: HOME | End: 2019-02-01
Payer: MEDICARE

## 2019-02-01 LAB
ALBUMIN SERPL-MCNC: 3.9 G/DL (ref 3.4–5)
ALBUMIN/GLOB SERPL: 1.4 {RATIO} (ref 0.8–1.7)
ALP SERPL-CCNC: 82 U/L (ref 45–117)
ALT SERPL-CCNC: 20 U/L (ref 16–61)
ANION GAP SERPL CALC-SCNC: 4 MMOL/L (ref 3–18)
AST SERPL-CCNC: 14 U/L (ref 15–37)
BILIRUB SERPL-MCNC: 0.6 MG/DL (ref 0.2–1)
BUN SERPL-MCNC: 20 MG/DL (ref 7–18)
BUN/CREAT SERPL: 19 (ref 12–20)
CALCIUM SERPL-MCNC: 8.7 MG/DL (ref 8.5–10.1)
CHLORIDE SERPL-SCNC: 108 MMOL/L (ref 100–108)
CHOLEST SERPL-MCNC: 151 MG/DL
CO2 SERPL-SCNC: 30 MMOL/L (ref 21–32)
CREAT SERPL-MCNC: 1.08 MG/DL (ref 0.6–1.3)
GLOBULIN SER CALC-MCNC: 2.7 G/DL (ref 2–4)
GLUCOSE SERPL-MCNC: 104 MG/DL (ref 74–99)
HDLC SERPL-MCNC: 102 MG/DL (ref 40–60)
HDLC SERPL: 1.5 {RATIO} (ref 0–5)
LDLC SERPL CALC-MCNC: 38.2 MG/DL (ref 0–100)
LIPID PROFILE,FLP: ABNORMAL
POTASSIUM SERPL-SCNC: 4.8 MMOL/L (ref 3.5–5.5)
PROT SERPL-MCNC: 6.6 G/DL (ref 6.4–8.2)
SODIUM SERPL-SCNC: 142 MMOL/L (ref 136–145)
TRIGL SERPL-MCNC: 54 MG/DL (ref ?–150)
VLDLC SERPL CALC-MCNC: 10.8 MG/DL

## 2019-02-01 PROCEDURE — 80053 COMPREHEN METABOLIC PANEL: CPT

## 2019-02-01 PROCEDURE — 36415 COLL VENOUS BLD VENIPUNCTURE: CPT

## 2019-02-01 PROCEDURE — 80061 LIPID PANEL: CPT

## 2019-02-01 NOTE — PROGRESS NOTES
Spoke with patient (all identifiers verified) to advise labs are fairly stable. Further discussion on follow up visit. Patient verbalized understanding. He was reminded he has a follow-up visit scheduled with Dr. Lopez Courser on 2/06/19 at 8:15A.

## 2019-02-06 ENCOUNTER — OFFICE VISIT (OUTPATIENT)
Dept: FAMILY MEDICINE CLINIC | Age: 74
End: 2019-02-06

## 2019-02-06 VITALS
WEIGHT: 154 LBS | BODY MASS INDEX: 23.34 KG/M2 | OXYGEN SATURATION: 97 % | TEMPERATURE: 97.7 F | HEART RATE: 60 BPM | HEIGHT: 68 IN | DIASTOLIC BLOOD PRESSURE: 74 MMHG | RESPIRATION RATE: 16 BRPM | SYSTOLIC BLOOD PRESSURE: 118 MMHG

## 2019-02-06 DIAGNOSIS — M25.50 MULTIPLE JOINT PAIN: ICD-10-CM

## 2019-02-06 DIAGNOSIS — E78.5 HYPERLIPIDEMIA, UNSPECIFIED HYPERLIPIDEMIA TYPE: ICD-10-CM

## 2019-02-06 DIAGNOSIS — G57.61 MORTON'S NEUROMA OF RIGHT FOOT: ICD-10-CM

## 2019-02-06 DIAGNOSIS — Z86.010 PERSONAL HISTORY OF COLONIC POLYPS: ICD-10-CM

## 2019-02-06 DIAGNOSIS — Z13.6 SCREENING FOR AAA (ABDOMINAL AORTIC ANEURYSM): ICD-10-CM

## 2019-02-06 DIAGNOSIS — Z00.00 INITIAL MEDICARE ANNUAL WELLNESS VISIT: Primary | ICD-10-CM

## 2019-02-06 DIAGNOSIS — N41.9 PROSTATITIS, UNSPECIFIED PROSTATITIS TYPE: ICD-10-CM

## 2019-02-06 NOTE — ACP (ADVANCE CARE PLANNING)
Advance Care Planning (ACP) Provider Conversation Snapshot    Date of ACP Conversation: 02/06/19  Persons included in Conversation:  patient  Length of ACP Conversation in minutes:  <16 minutes (Non-Billable)    Authorized Decision Maker (if patient is incapable of making informed decisions):    This person is:   daughter / Glenice Card             For Patients with Decision Making Capacity:   discussed advance directive. given honoring choice information and josé miguel meet with nurse navigator for paper work     Richard Henriquez Outcomes / Follow-Up Plan:   Recommended completion of Advance Directive form after review of ACP materials and conversation with prospective healthcare agent

## 2019-02-06 NOTE — PROGRESS NOTES
HISTORY OF PRESENT ILLNESS Alec Celeste is a 72 y.o. male. HPI: Here for routine follow up and lab discussion. Review labs with him. Discussed continue healthy life style as he is working on low cab and high protein diet. Also dose routine exercise. Normal BMI./ 
H/o prostatitis. Seen urology. Asymptomatic at this time. No urinary complains. No abdominal pain. Will follow their recommendations. Also multiple site joint pain. Hand joint pain and stiffness, numbness more during night. Has been diagnosed with arthritis. Labs include RF review through care everywhere was negative. Currently noted DIP and PIP joint deformity. Said had hand x-rays in the past done by prior PCP and was told it is arthritis and symptomatic treatment was offered. No weakness of upper ext. Sitting comfortable and no distress. Visit Vitals /74 (BP 1 Location: Left arm, BP Patient Position: Sitting) Pulse 60 Temp 97.7 °F (36.5 °C) (Oral) Resp 16 Ht 5' 8.25\" (1.734 m) Wt 154 lb (69.9 kg) SpO2 97% BMI 23.24 kg/m² Review medication list, vitals, problem list,allergies. Denies any headache, dizziness, no chest pain or trouble breathing, no arm or leg weakness. No nausea or vomiting, no weight or appetite changes, no depression or anxiety. No urine or bowel complains, no palpitation, no diaphoresis. No abdominal pain. No sleep concern. No unusual fatigue. Lab Results Component Value Date/Time WBC 5.9 02/22/2017 03:00 PM  
 HGB 14.1 02/22/2017 03:00 PM  
 HCT 42.0 02/22/2017 03:00 PM  
 PLATELET 935 69/09/7523 03:00 PM  
 MCV 93.1 02/22/2017 03:00 PM  
 
Lab Results Component Value Date/Time  Sodium 142 02/01/2019 08:16 AM  
 Potassium 4.8 02/01/2019 08:16 AM  
 Chloride 108 02/01/2019 08:16 AM  
 CO2 30 02/01/2019 08:16 AM  
 Anion gap 4 02/01/2019 08:16 AM  
 Glucose 104 (H) 02/01/2019 08:16 AM  
 BUN 20 (H) 02/01/2019 08:16 AM  
 Creatinine 1.08 02/01/2019 08:16 AM  
 BUN/Creatinine ratio 19 02/01/2019 08:16 AM  
 GFR est AA >60 02/01/2019 08:16 AM  
 GFR est non-AA >60 02/01/2019 08:16 AM  
 Calcium 8.7 02/01/2019 08:16 AM  
 Bilirubin, total 0.6 02/01/2019 08:16 AM  
 AST (SGOT) 14 (L) 02/01/2019 08:16 AM  
 Alk. phosphatase 82 02/01/2019 08:16 AM  
 Protein, total 6.6 02/01/2019 08:16 AM  
 Albumin 3.9 02/01/2019 08:16 AM  
 Globulin 2.7 02/01/2019 08:16 AM  
 A-G Ratio 1.4 02/01/2019 08:16 AM  
 ALT (SGPT) 20 02/01/2019 08:16 AM  
 
Lab Results Component Value Date/Time Cholesterol, total 151 02/01/2019 08:16 AM  
 HDL Cholesterol 102 (H) 02/01/2019 08:16 AM  
 LDL, calculated 38.2 02/01/2019 08:16 AM  
 VLDL, calculated 10.8 02/01/2019 08:16 AM  
 Triglyceride 54 02/01/2019 08:16 AM  
 CHOL/HDL Ratio 1.5 02/01/2019 08:16 AM  
 
Lab Results Component Value Date/Time TSH 1.37 10/04/2018 08:23 AM  
 
Lab Results Component Value Date/Time Hemoglobin A1c 5.5 03/17/2016 08:30 AM  
 
Lab Results Component Value Date/Time  
 Prostate Specific Ag 2.48 06/05/2018 02:53 PM  
 Prostate Specific Ag 2.62 01/05/2017 01:55 PM  
 Prostate Specific Ag 1.9 10/17/2013 Hyperlipidemia. Working on life style modification with diet modification and routine exercise. Average more than 1 mile on treadmill and very active routine. Had previously palpable thyroid gland. Done ultrasound and showed hypertrophy of isthmus without any nodule. ROS: see HPI Physical Exam  
Constitutional: He is oriented to person, place, and time. No distress. Cardiovascular: Normal rate, regular rhythm and normal heart sounds. Pulmonary/Chest: CTA Abdominal: Soft. Bowel sounds are normal. There is no tenderness. Musculoskeletal: He exhibits no edema. Lymphadenopathy:  
  He has no cervical adenopathy. Neurological: He is oriented to person, place, and time.   
Psychiatric: His behavior is normal.  
 
 
ASSESSMENT and PLAN 
 1. Multiple joint pain: for now observe .symptomatic treatment. Prior labs include RF and COLTON negative . Review through care everywhere. Had hand x-ray done previously. Not able to see results in the care everywhere. Pt wanted to wait and also putting rheumatology referral on hold as well. F/u next visit. M25.50 719.49 2. 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 Now on and off skin break down from nail irritation. Sending to podiatry. Has on and off pain. Mild to moderate in intensity. G57.61 355.6 REFERRAL TO PODIATRY 3. Hyperlipidemia, unspecified hyperlipidemia type: diet modification. E78.5 272.4 4. Personal history of colonic polyps: scheduled for colonoscopy this year. Z86.010 V12.72 5. Prostatitis, unspecified prostatitis type: asymptomatic. Following urology recommendations. N41.9 601.9 Pt understood and agree with the plan Review HM Follow-up Disposition: 
Return in about 6 months (around 8/6/2019).

## 2019-02-06 NOTE — PROGRESS NOTES
Chief Complaint Patient presents with  Hypertension  Cholesterol Problem MargiRoseanne Annual Wellness Visit  Pain (Chronic) Mc's Neuroma- having a really hard time with pain This is an Initial Medicare Annual Wellness Exam (AWV) (Performed 12 months after IPPE or effective date of Medicare Part B enrollment, Once in a lifetime) I have reviewed the patient's medical history in detail and updated the computerized patient record. History Past Medical History:  
Diagnosis Date  Basal cell carcinoma  BPH (benign prostatic hyperplasia)  Cancer ([facility] [address] Basal Cell Carcinoma  Depression  Gastrointestinal disorder Diverticulitis  GERD (gastroesophageal reflux disease)  Inguinal hernia   
 left side  Other ill-defined conditions(799.89) BPH  Psychiatric disorder Aggression, Depression Past Surgical History:  
Procedure Laterality Date  HX HERNIA REPAIR  03/14/2017  HX MALIGNANT SKIN LESION EXCISION Current Outpatient Medications Medication Sig Dispense Refill  naproxen sodium (ALEVE) 220 mg cap Take  by mouth.  MELATONIN PO Take 10 mg by mouth as needed.  lisinopril (PRINIVIL, ZESTRIL) 10 mg tablet Take 1 Tab by mouth daily. 90 Tab 1  
 atorvastatin (LIPITOR) 20 mg tablet Take 1 Tab by mouth daily. 90 Tab 1  
 alfuzosin SR (UROXATRAL) 10 mg SR tablet Take 1 Tab by mouth daily (after dinner). 90 Tab 1  
 finasteride (PROSCAR) 5 mg tablet Take 1 Tab by mouth daily. 90 Tab 1  
 omeprazole (PRILOSEC) 20 mg capsule Take 1 Cap by mouth daily. 90 Cap 1  
 acetaminophen (TYLENOL) 500 mg tablet Take  by mouth every six (6) hours as needed for Pain. Allergies Allergen Reactions  Naproxen Unable to Obtain Patient states that he is not allergic. Family History Problem Relation Age of Onset  Prostate Cancer Father Social History Tobacco Use  Smoking status: Former Smoker  Smokeless tobacco: Never Used  Tobacco comment: quit 1971 Substance Use Topics  Alcohol use: Yes Alcohol/week: 1.0 oz Types: 2 Cans of beer per week Comment: wine 2-3 x per week with dinner Patient Active Problem List  
Diagnosis Code  BPH (benign prostatic hyperplasia) N40.0  Groin pain R10.30  Urinary frequency R35.0  Hernia K46.9  Left lower quadrant pain R10.32  
 Groin fluid collection R18.8  Hearing problem of both ears H91.93  
 Arthritis M19.90  
 Hyperlipidemia E78.5  Essential hypertension I10 Depression Risk Factor Screening: PHQ over the last two weeks 10/1/2018 Little interest or pleasure in doing things Not at all Feeling down, depressed, irritable, or hopeless Not at all Total Score PHQ 2 0 Alcohol Risk Factor Screening: You do not drink alcohol or very rarely. 2 glasses/wine weekly Functional Ability and Level of Safety:  
 
Hearing Loss Hearing is good. Activities of Daily Living The home contains: raised toilet (for wife) Patient does total self care Fall Risk Fall Risk Assessment, last 12 mths 10/1/2018 Able to walk? Yes Fall in past 12 months? No  
 
 
Abuse Screen Patient is not abused Cognitive Screening Evaluation of Cognitive Function: 
Has your family/caregiver stated any concerns about your memory: no 
Normal 
 
Patient Care Team  
Patient Care Team: 
Ashlee Becker MD as PCP - [facility]) Ronnie Moralez MD (Orthopedic Surgery) Jasson Haywood MD (Otolaryngology) Jason Narvaez MD as Physician (Urology) Assessment/Plan Education and counseling provided: 
Are appropriate based on today's review and evaluation Review nurses note and assessment. Agree with that. Discussed 5 years health plan and given copy in AVS. Discussed advance directive. Given hand out on honoring choice Discussed DNR and DNI.   
 
 
Diagnoses and all orders for this visit: 
 
 1. Initial Medicare annual wellness visit Ordered AAA screening. He is scheduled for a glaucoma screening test this month, 
Also he is scheduled for his 5 year colonoscopy this month as well. Had hepatitis C test in the past and was negative. Will think about shingle shot ( shingrex) as he had shingle shot previously. Health Maintenance Due Topic Date Due  
 Hepatitis C Screening  1945  COLONOSCOPY  08/20/1965  DTaP/Tdap/Td series (1 - Tdap) 08/20/1968  Shingrix Vaccine Age 50> (1 of 2) 08/20/1997  GLAUCOMA SCREENING Q2Y  08/20/2012  MEDICARE YEARLY EXAM  03/14/2018

## 2019-02-06 NOTE — PATIENT INSTRUCTIONS
Medicare Part B Preventive Services Limitations Recommendation Scheduled Bone Mass Measurement 
(age 72 & older, biennial) Requires diagnosis related to osteoporosis or estrogen deficiency. Biennial benefit unless patient has history of long-term glucocorticoid tx or baseline is needed because initial test was by other method Not applicable Cardiovascular Screening Blood Tests (every 5 years) Total cholesterol, HDL, Triglycerides and ECG Order blood work  as a panel if possible and  for adults with routine risk  an electrocardiogram (ECG) at intervals determined by the provider. 02/01/19 Colorectal Cancer Screening 
-Fecal occult blood test (annual) -Flexible sigmoidoscopy (5y) 
-Screening colonoscopy (10y) -Barium Enema Colorectal cancer screening should be done for adults age 54-65 with no increased risk factors for colorectal cancer. There are a number of acceptable methods of screening for this type of cancer. Each test has its own benefits and drawbacks. Discuss with your provider what is most appropriate for you during your annual wellness visit. The different tests include: colonoscopy (considered the best screening method), a fecal occult blood test, a fecal DNA test, and sigmoidoscopy Due now and scheduled for 22 nd feb Counseling to Prevent Tobacco Use (up to 8 sessions per year) - Counseling greater than 3 and up to 10 minutes - Counseling greater than 10 minutes Patients must be asymptomatic of tobacco-related conditions to receive as preventive service Not applicable Diabetes Screening Tests (at least every 3 years, Medicare covers annually or at 6-month intervals for prediabetic patients) Fasting blood sugar (FBS) or glucose tolerance test (GTT) All adults age 38-68 who are overweight should have a diabetes screening test once every three years.  
-Other screening tests & preventive services for persons with diabetes include: an eye exam to screen for diabetic retinopathy, a kidney function test, a foot exam, and stricter control over your cholesterol. 02/01/19 Diabetes Self-Management Training (DSMT) (no USPSTF recommendation) Requires referral by treating physician for patient with diabetes or renal disease. 10 hours of initial DSMT session of no less than 30 minutes each in a continuous 12-month period. 2 hours of follow-up DSMT in subsequent years. Not applicable Glaucoma Screening (no USPSTF recommendation) Diabetes mellitus, family history, , age 48 or over,  American, age 72 or over Due Human Immunodeficiency Virus (HIV) Screening (annually for increased risk patients) HIV-1 and HIV-2 by EIA, MIAH, rapid antibody test, or oral mucosa transudate Patient must be at increased risk for HIV infection per USPSTF guidelines or pregnant. Tests covered annually for patients at increased risk. Pregnant patients may receive up to 3 test during pregnancy. Not applicable Medical Nutrition Therapy (MNT) (for diabetes or renal disease not recommended schedule) Requires referral by treating physician for patient with diabetes or renal disease. Can be provided in same year as diabetes self-management training (DSMT), and CMS recommends medical nutrition therapy take place after DSMT. Up to 3 hours for initial year and 2 hours in subsequent years. Not applicable Prostate Cancer Screening (annually up to age 76) - Digital rectal exam (SAMMIE) - Prostate specific antigen (PSA) Annually (age 48 or over), SAMMIE not paid separately when covered E/M service is provided on same date Men up to age 76 may need a screening blood test for prostate cancer at certain intervals, depending on their personal and family history. This decision is between the patient and his provider. 06/05/18 Seasonal Influenza Vaccination (annually) All adults should have a flu vaccine yearly  09/29/18 Pneumococcal Vaccination (once after 72) All adults  over age 72 should receive the recommended pneumonia vaccines. Current USPSTF guidelines recommend a series of two vaccines for the best pneumonia protection. Completed Hepatitis B Vaccinations (if medium/high risk) Medium/high risk factors:  End-stage renal disease, Hemophiliacs who received Factor VIII or IX concentrates, Clients of institutions for the mentally retarded, Persons who live in the same house as a HepB virus carrier, Homosexual men, Illicit injectable drug abusers. Not applicable Shingles Vaccination A shingles vaccine is also recommended once in a lifetime after age 61 Due Will speak with pharmacist   
Ultrasound Screening for Abdominal Aortic Aneurysm (AAA) (once) An Abdominal Aortic Aneurysm (AAA) Screening is recommended for men age 73-68 who has ever smoked in their lifetime. of the following criteria: 
- Men who are 73-68 years old and have smoked more than 100 cigarettes in their lifetime. 
-Anyone with a FH of AAA 
-Anyone recommended for screening by USPSTF Former Smoker-no AAA screen in imaging Order today Hep C All adults born between 80 and 1965 should be screened once for Hepatitis C Due Per patient had it in the past . Was negative Tetanus  All adults should have a tetanus vaccine every 10 years Due Per patient had it within 10 years. Arthritis: Care Instructions Your Care Instructions Arthritis, also called osteoarthritis, is a breakdown of the cartilage that cushions your joints. When the cartilage wears down, your bones rub against each other. This causes pain and stiffness. Many people have some arthritis as they age. Arthritis most often affects the joints of the spine, hands, hips, knees, or feet. You can take simple measures to protect your joints, ease your pain, and help you stay active. Follow-up care is a key part of your treatment and safety.  Be sure to make and go to all appointments, and call your doctor if you are having problems. It's also a good idea to know your test results and keep a list of the medicines you take. How can you care for yourself at home? · Stay at a healthy weight. Being overweight puts extra strain on your joints. · Talk to your doctor or physical therapist about exercises that will help ease joint pain. ? Stretch. You may enjoy gentle forms of yoga to help keep your joints and muscles flexible. ? Walk instead of jog. Other types of exercise that are less stressful on the joints include riding a bicycle, swimming, mikal chi, or water exercise. ? Lift weights. Strong muscles help reduce stress on your joints. Stronger thigh muscles, for example, take some of the stress off of the knees and hips. Learn the right way to lift weights so you do not make joint pain worse. · Take your medicines exactly as prescribed. Call your doctor if you think you are having a problem with your medicine. · Take pain medicines exactly as directed. ? If the doctor gave you a prescription medicine for pain, take it as prescribed. ? If you are not taking a prescription pain medicine, ask your doctor if you can take an over-the-counter medicine. · Use a cane, crutch, walker, or another device if you need help to get around. These can help rest your joints. You also can use other things to make life easier, such as a higher toilet seat and padded handles on kitchen utensils. · Do not sit in low chairs, which can make it hard to get up. · Put heat or cold on your sore joints as needed. Use whichever helps you most. You also can take turns with hot and cold packs. ? Apply heat 2 or 3 times a day for 20 to 30 minutesusing a heating pad, hot shower, or hot packto relieve pain and stiffness. ? Put ice or a cold pack on your sore joint for 10 to 20 minutes at a time. Put a thin cloth between the ice and your skin. When should you call for help? Call your doctor now or seek immediate medical care if: 
  · You have sudden swelling, warmth, or pain in any joint.  
  · You have joint pain and a fever or rash.  
  · You have such bad pain that you cannot use a joint.  
 Watch closely for changes in your health, and be sure to contact your doctor if: 
  · You have mild joint symptoms that continue even with more than 6 weeks of care at home.  
  · You have stomach pain or other problems with your medicine. Where can you learn more? Go to http://jimena-catarino.info/. Enter S539 in the search box to learn more about \"Arthritis: Care Instructions. \" Current as of: Tere 10, 2018 Content Version: 11.9 © 9847-0834 [email or web address]. Care instructions adapted under license by [employer] (which disclaims liability or warranty for this information). If you have questions about a medical condition or this instruction, always ask your healthcare professional. [address] any warranty or liability for your use of this information.

## 2019-02-13 ENCOUNTER — HOSPITAL ENCOUNTER (OUTPATIENT)
Dept: ULTRASOUND IMAGING | Age: 74
Discharge: HOME | End: 2019-02-13
Attending: FAMILY MEDICINE
Payer: MEDICARE

## 2019-02-13 DIAGNOSIS — Z13.6 SCREENING FOR AAA (ABDOMINAL AORTIC ANEURYSM): ICD-10-CM

## 2019-02-13 PROCEDURE — 76706 US ABDL AORTA SCREEN AAA: CPT

## 2019-02-21 PROBLEM — Z86.010 HISTORY OF COLON POLYPS: Status: ACTIVE | Noted: 2019-02-21

## 2019-02-22 RX ORDER — FINASTERIDE 5 MG/1
5 TABLET, FILM COATED ORAL DAILY
Qty: 90 TAB | Refills: 1 | Status: SHIPPED | OUTPATIENT
Start: 2019-02-22 | End: 2019-09-08 | Stop reason: SDUPTHER

## 2019-04-15 NOTE — TELEPHONE ENCOUNTER
This patient contacted office for the following prescriptions to be filled:    Medication requested :   Requested Prescriptions     Pending Prescriptions Disp Refills    omeprazole (PRILOSEC) 20 mg capsule 90 Cap 1     Sig: Take 1 Cap by mouth daily.      PCP: Adilia Martin or Print: pharmacy  Mail order or Local pharmacy:     Last OV: 2/6/19  Last labs: 2/1/19  Next OV: 8/6/19

## 2019-04-16 RX ORDER — OMEPRAZOLE 20 MG/1
20 CAPSULE, DELAYED RELEASE ORAL DAILY
Qty: 90 CAP | Refills: 1 | Status: SHIPPED | OUTPATIENT
Start: 2019-04-16 | End: 2019-12-16

## 2019-05-20 RX ORDER — ATORVASTATIN CALCIUM 20 MG/1
20 TABLET, FILM COATED ORAL DAILY
Qty: 90 TAB | Refills: 1 | Status: SHIPPED | OUTPATIENT
Start: 2019-05-20 | End: 2019-10-16 | Stop reason: SDUPTHER

## 2019-05-20 RX ORDER — LISINOPRIL 10 MG/1
10 TABLET ORAL DAILY
Qty: 90 TAB | Refills: 1 | Status: SHIPPED | OUTPATIENT
Start: 2019-05-20 | End: 2019-10-16 | Stop reason: SDUPTHER

## 2019-06-20 ENCOUNTER — APPOINTMENT (OUTPATIENT)
Dept: GENERAL RADIOLOGY | Age: 74
End: 2019-06-20
Attending: PHYSICIAN ASSISTANT
Payer: MEDICARE

## 2019-06-20 ENCOUNTER — HOSPITAL ENCOUNTER (EMERGENCY)
Age: 74
Discharge: HOME | End: 2019-06-20
Attending: EMERGENCY MEDICINE
Payer: MEDICARE

## 2019-06-20 ENCOUNTER — TELEPHONE (OUTPATIENT)
Dept: FAMILY MEDICINE CLINIC | Age: 74
End: 2019-06-20

## 2019-06-20 VITALS
TEMPERATURE: 99 F | DIASTOLIC BLOOD PRESSURE: 85 MMHG | OXYGEN SATURATION: 98 % | RESPIRATION RATE: 18 BRPM | HEART RATE: 66 BPM | BODY MASS INDEX: 22.51 KG/M2 | WEIGHT: 152 LBS | HEIGHT: 69 IN | SYSTOLIC BLOOD PRESSURE: 146 MMHG

## 2019-06-20 DIAGNOSIS — S61.012A LACERATION OF LEFT THUMB WITHOUT FOREIGN BODY WITHOUT DAMAGE TO NAIL, INITIAL ENCOUNTER: Primary | ICD-10-CM

## 2019-06-20 DIAGNOSIS — M25.512 ACUTE PAIN OF LEFT SHOULDER: ICD-10-CM

## 2019-06-20 DIAGNOSIS — Z23 NEED FOR TETANUS BOOSTER: ICD-10-CM

## 2019-06-20 PROCEDURE — 74011250636 HC RX REV CODE- 250/636: Performed by: PHYSICIAN ASSISTANT

## 2019-06-20 PROCEDURE — 90471 IMMUNIZATION ADMIN: CPT

## 2019-06-20 PROCEDURE — 77030039266 HC ADH SKN EXOFIN S2SG -A

## 2019-06-20 PROCEDURE — 73030 X-RAY EXAM OF SHOULDER: CPT

## 2019-06-20 PROCEDURE — 75810000293 HC SIMP/SUPERF WND  RPR

## 2019-06-20 PROCEDURE — 90715 TDAP VACCINE 7 YRS/> IM: CPT | Performed by: PHYSICIAN ASSISTANT

## 2019-06-20 PROCEDURE — 99282 EMERGENCY DEPT VISIT SF MDM: CPT

## 2019-06-20 RX ORDER — IBUPROFEN 200 MG
400 TABLET ORAL
COMMUNITY

## 2019-06-20 RX ADMIN — TETANUS TOXOID, REDUCED DIPHTHERIA TOXOID AND ACELLULAR PERTUSSIS VACCINE, ADSORBED 0.5 ML: 5; 2.5; 8; 8; 2.5 SUSPENSION INTRAMUSCULAR at 16:14

## 2019-06-20 NOTE — TELEPHONE ENCOUNTER
Spoke with patient who states he cut his thumb while trying to put up umbrella outside. Patient is concerned because his last TDAP was in 2008 (done in [place]) and he states it is [place] out there\". Patient reports that the laceration was bleeding but he has since elevated his hand and applied pressure to stop the bleeding. Advised patient that he will need to got to ER to have the laceration evaluated to see if stitches are needed, wound needs to be cleaned, and for TDAP. He voices understanding.

## 2019-06-20 NOTE — ED PROVIDER NOTES
EMERGENCY DEPARTMENT HISTORY AND PHYSICAL EXAM    Date: 6/20/2019  Patient Name: Aman Ling    History of Presenting Illness     Chief Complaint   Patient presents with    Immunization/Injection    Shoulder Pain    Laceration         History Provided By: Patient       Chief Complaint: Laceration, left shoulder pain   Duration: 1 day   Timing:  Acute   Location: left shoulder and left thumb   Quality: aching   Severity: 6/10   Modifying Factors: movement    Associated Symptoms: none       Additional History (Context): Aman Ling is a 72 y.o. male with a history of BPH and hypertension who presents today for 1 day history of laceration to the distal aspect of the left thumb and left shoulder pain. Patient states he was trying to set up a deck on [address] when this accident occurred. Denies any previous injury or surgeries to both locations. Has not done anything for this at home. 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 Reports his main concern is the need for updated tetanus. PCP: Hien Cisneros MD    Current Outpatient Medications   Medication Sig Dispense Refill    ibuprofen (MOTRIN IB) 200 mg tablet Take 400 mg by mouth every six (6) hours as needed for Pain.  lisinopril (PRINIVIL, ZESTRIL) 10 mg tablet Take 1 Tab by mouth daily. 90 Tab 1    atorvastatin (LIPITOR) 20 mg tablet Take 1 Tab by mouth daily. 90 Tab 1    omeprazole (PRILOSEC) 20 mg capsule Take 1 Cap by mouth daily. 90 Cap 1    finasteride (PROSCAR) 5 mg tablet Take 1 Tab by mouth daily. 90 Tab 1    alfuzosin SR (UROXATRAL) 10 mg SR tablet Take 1 Tab by mouth daily (after dinner). 90 Tab 1    acetaminophen (TYLENOL) 500 mg tablet Take  by mouth every six (6) hours as needed for Pain.          Past History     Past Medical History:  Past Medical History:   Diagnosis Date    Basal cell carcinoma     BPH (benign prostatic hyperplasia)     Cancer (HCC)     Basal Cell Carcinoma    Depression     Gastrointestinal disorder     Diverticulitis    GERD (gastroesophageal reflux disease)     History of colon polyps 2/21/2019    Hypertension     Inguinal hernia     left side    Other ill-defined conditions(799.89)     BPH    Psychiatric disorder     Aggression, Depression       Past Surgical History:  Past Surgical History:   Procedure Laterality Date    COLONOSCOPY N/A 2/22/2019    COLONOSCOPY, SURVEILLANCE performed by Romy Mart[facility]nez [facility] at [facility] ENDOSCOPY    HX COLONOSCOPY      [place] 47  03/14/2017    HX MALIGNANT SKIN LESION EXCISION         Family History:  Family History   Problem Relation Age of Onset    Prostate Cancer Father        Social History:  Social History     Tobacco Use    Smoking status: Former Smoker    Smokeless tobacco: Never Used    Tobacco comment: quit 1971   Substance Use Topics    Alcohol use: Yes     Alcohol/week: 1.0 oz     Types: 2 Cans of beer per week     Comment: wine 2-3 x per week with dinner    Drug use: No       Allergies: Allergies   Allergen Reactions    Naproxen Unable to Obtain     Patient states that he is not allergic. Review of Systems   Review of Systems   Constitutional: Negative for chills and fever. HENT: Negative for congestion, rhinorrhea and sore throat. Respiratory: Negative for cough and shortness of breath. Cardiovascular: Negative for chest pain. Gastrointestinal: Negative for abdominal pain, blood in stool, constipation, diarrhea, nausea and vomiting. Genitourinary: Negative for dysuria, frequency and hematuria. Musculoskeletal: Positive for arthralgias. Negative for back pain and myalgias. Skin: Positive for wound. Negative for rash. Neurological: Negative for dizziness and headaches. All other systems reviewed and are negative.     All Other Systems Negative  Physical Exam     Vitals:    06/20/19 1549   BP: 146/85   Pulse: 66   Resp: 18   Temp: 99 °F (37.2 °C)   SpO2: 98%   Weight: 68.9 kg (152 lb)   Height: 5' 8.5\" (1.74 m)     Physical Exam Constitutional: He is oriented to person, place, and time. He appears well-developed and well-nourished. No distress. HENT:   Head: Normocephalic and atraumatic. Eyes: Conjunctivae are normal.   Neck: Normal range of motion. Neck supple. Cardiovascular: Normal rate, regular rhythm and normal heart sounds. Pulmonary/Chest: Effort normal and breath sounds normal. No respiratory distress. He exhibits no tenderness. Abdominal: Soft. Bowel sounds are normal. He exhibits no distension. There is no tenderness. There is no rebound and no guarding. Musculoskeletal: Normal range of motion. He exhibits no edema or deformity. Left shoulder: He exhibits tenderness. He exhibits normal range of motion, no bony tenderness, no swelling, no effusion, no crepitus, no laceration, normal pulse and normal strength. Neurological: He is alert and oriented to person, place, and time. Skin: Skin is warm and dry. Laceration noted. He is not diaphoretic.   superficial 1 cm laceration noted to the distal pad of the left thumb, bleeding well controlled    Psychiatric: He has a normal mood and affect. Nursing note and vitals reviewed. Diagnostic Study Results     Labs -   No results found for this or any previous visit (from the past 12 hour(s)). Radiologic Studies -   XR SHOULDER LT AP/LAT MIN 2 V   Final Result   IMPRESSION:      Degenerative arthrosis of the left shoulder. CT Results  (Last 48 hours)    None        CXR Results  (Last 48 hours)    None            Medical Decision Making   I am the first provider for this patient. I reviewed the vital signs, available nursing notes, past medical history, past surgical history, family history and social history. Vital Signs-Reviewed the patient's vital signs.       Records Reviewed: Nursing Notes and Old Medical Records     Procedures: None   Wound Repair  Date/Time: 6/20/2019 5:10 PM  Performed by: PAPreparation: skin prepped with ChloraPrep  Location details: left thumb  Wound length:2.5 cm or less  Foreign bodies: no foreign bodies  Skin closure: glue  Technique: simple  Patient tolerance: Patient tolerated the procedure well with no immediate complications  My total time at bedside, performing this procedure was 16-30 minutes. Provider Notes (Medical Decision Making):     Differential: fracture, dislocation, abrasion, sprain, contusion, laceration       Plan: Will order xray of shoulder, update tetanus and repair wound with glue. 5:15 PM  Have discussed proper wound care with patient at home. Will discharge home. Return precautions have been given. Will give sling for comfort. Advised Ortho follow-up for left shoulder. reassuring x-rays have been shared. Advised Tylenol Motrin as needed for pain. Patient agrees with the plan and management and states all questions have been thoroughly answered and there are no more remaining questions. MED RECONCILIATION:  No current facility-administered medications for this encounter. Current Outpatient Medications   Medication Sig    ibuprofen (MOTRIN IB) 200 mg tablet Take 400 mg by mouth every six (6) hours as needed for Pain.  lisinopril (PRINIVIL, ZESTRIL) 10 mg tablet Take 1 Tab by mouth daily.  atorvastatin (LIPITOR) 20 mg tablet Take 1 Tab by mouth daily.  omeprazole (PRILOSEC) 20 mg capsule Take 1 Cap by mouth daily.  finasteride (PROSCAR) 5 mg tablet Take 1 Tab by mouth daily.  alfuzosin SR (UROXATRAL) 10 mg SR tablet Take 1 Tab by mouth daily (after dinner).  acetaminophen (TYLENOL) 500 mg tablet Take  by mouth every six (6) hours as needed for Pain. Disposition:  Home     DISCHARGE NOTE:   Pt has been reexamined. Patient has no new complaints, changes, or physical findings. Care plan outlined and precautions discussed. Results of workup were reviewed with the patient. All medications were reviewed with the patient.  All of pt's questions and concerns were addressed. Patient was instructed and agrees to follow up with PCP/Ortho  as well as to return to the ED upon further deterioration. Patient is ready to go home. Follow-up Information     Follow up With Specialties Details Why Contact [facility] EMERGENCY DEPT Emergency Medicine  As needed [address] [facility]  [phone]    Jose Hammer MD [facility] In 2 days  [facility] [address] [facility]  [phone]            Discharge Medication List as of 6/20/2019  4:57 PM              Diagnosis     Clinical Impression:   1. Laceration of left thumb without foreign body without damage to nail, initial encounter    2. Acute pain of left shoulder    3.  Need for tetanus booster

## 2019-06-20 NOTE — ED TRIAGE NOTES
Patient states no allergy to Naproxen. He states injury to left shoulder yesterday. C/o continuing pain to left shoulder. Advises of laceration to left thumb- distal aspect. Last tetanus was 2008.

## 2019-06-20 NOTE — DISCHARGE INSTRUCTIONS
Patient Education        Cuts Closed With Adhesives: Care Instructions  Your Care Instructions  A cut can happen anywhere on your body. The doctor used an adhesive to close the cut. When the adhesive dries, it forms a film that holds the edges of the cut together. Skin adhesives are sometimes called liquid stitches. If the cut went deep and through the skin, the doctor may have put in a layer of stitches below the adhesive. The deeper layer of stitches brings the deep part of the cut together. These stitches will dissolve and don't need to be removed. You don't see the stitches, only the adhesive. You may have a bandage. The doctor has checked you carefully, but problems can develop later. If you notice any problems or new symptoms, get medical treatment right away. Follow-up care is a key part of your treatment and safety. Be sure to make and go to all appointments, and call your doctor if you are having problems. It's also a good idea to know your test results and keep a list of the medicines you take. How can you care for yourself at home? · Keep the cut dry for the first 24 to 48 hours. After this, you can shower if your doctor okays it. Pat the cut dry. · Don't soak the cut, such as in a bathtub. Your doctor will tell you when it's safe to get the cut wet. · If your doctor told you how to care for your cut, follow your doctor's instructions. If you did not get instructions, follow this general advice:  ? Do not put any kind of ointment, cream, or lotion over the area. This can make the adhesive fall off too soon. ? After the first 24 to 48 hours, wash around the cut with clean water 2 times a day. Do not use hydrogen peroxide or alcohol, which can slow healing. ? If the doctor told you to use a bandage, put on a new bandage after cleaning the cut or if the bandage gets wet or dirty. · Prop up the sore area on a pillow anytime you sit or lie down during the next 3 days.  Try to keep it above the level of your heart. This will help reduce swelling. · Leave the skin adhesive on your skin until it falls off on its own. This may take 5 to 10 days. · Do not scratch, rub, or pick at the adhesive. · Do not put the sticky part of a bandage directly on the adhesive. · Avoid any activity that could cause your cut to reopen. · Be safe with medicines. Read and follow all instructions on the label. ? If the doctor gave you a prescription medicine for pain, take it as prescribed. ? If you are not taking a prescription pain medicine, ask your doctor if you can take an over-the-counter medicine. When should you call for help? Call your doctor now or seek immediate medical care if:    · You have new pain, or your pain gets worse.     · The skin near the cut is cold or pale or changes color.     · You have tingling, weakness, or numbness near the cut.     · The cut starts to bleed.     · You have trouble moving the area near the cut.     · You have symptoms of infection, such as:  ? Increased pain, swelling, warmth, or redness around the cut.  ? Red streaks leading from the cut.  ? Pus draining from the cut.  ? A fever.    Watch closely for changes in your health, and be sure to contact your doctor if:    · The cut reopens.     · You do not get better as expected. Where can you learn more? Go to http://jimena-catarino.info/. Enter P174 in the search box to learn more about \"Cuts Closed With Adhesives: Care Instructions. \"  Current as of: September 23, 2018  Content Version: 11.9  © 9438-3050 [employer]. Care instructions adapted under license by [employer] (which disclaims liability or warranty for this information). If you have questions about a medical condition or this instruction, always ask your healthcare professional. [address] any warranty or liability for your use of this information.

## 2019-06-28 RX ORDER — ALFUZOSIN HYDROCHLORIDE 10 MG/1
10 TABLET, EXTENDED RELEASE ORAL
Qty: 90 TAB | Refills: 1 | Status: SHIPPED | OUTPATIENT
Start: 2019-06-28 | End: 2019-06-28 | Stop reason: SDUPTHER

## 2019-06-28 RX ORDER — ALFUZOSIN HYDROCHLORIDE 10 MG/1
10 TABLET, EXTENDED RELEASE ORAL
Qty: 90 TAB | Refills: 1 | Status: SHIPPED | OUTPATIENT
Start: 2019-06-28 | End: 2019-12-31 | Stop reason: SDUPTHER

## 2019-10-16 RX ORDER — ATORVASTATIN CALCIUM 20 MG/1
TABLET, FILM COATED ORAL
Qty: 30 TAB | Refills: 0 | Status: SHIPPED | OUTPATIENT
Start: 2019-10-16 | End: 2019-12-31 | Stop reason: SDUPTHER

## 2019-10-16 RX ORDER — LISINOPRIL 10 MG/1
TABLET ORAL
Qty: 30 TAB | Refills: 0 | Status: SHIPPED | OUTPATIENT
Start: 2019-10-16 | End: 2019-12-16 | Stop reason: SDUPTHER

## 2019-11-18 ENCOUNTER — HOSPITAL ENCOUNTER (OUTPATIENT)
Dept: LAB | Age: 74
Discharge: HOME | End: 2019-11-18
Payer: MEDICARE

## 2019-11-18 DIAGNOSIS — N40.0 BENIGN PROSTATIC HYPERPLASIA, UNSPECIFIED WHETHER LOWER URINARY TRACT SYMPTOMS PRESENT: ICD-10-CM

## 2019-11-18 DIAGNOSIS — N40.0 BENIGN PROSTATIC HYPERPLASIA, UNSPECIFIED WHETHER LOWER URINARY TRACT SYMPTOMS PRESENT: Primary | ICD-10-CM

## 2019-11-18 LAB — PSA SERPL-MCNC: 1.7 NG/ML (ref 0–4)

## 2019-11-18 PROCEDURE — 84153 ASSAY OF PSA TOTAL: CPT

## 2019-11-18 PROCEDURE — 36415 COLL VENOUS BLD VENIPUNCTURE: CPT

## 2019-11-25 ENCOUNTER — OFFICE VISIT (OUTPATIENT)
Dept: UROLOGY | Age: 74
End: 2019-11-25

## 2019-11-25 VITALS
SYSTOLIC BLOOD PRESSURE: 128 MMHG | WEIGHT: 155 LBS | BODY MASS INDEX: 22.96 KG/M2 | HEIGHT: 69 IN | DIASTOLIC BLOOD PRESSURE: 63 MMHG | OXYGEN SATURATION: 97 % | HEART RATE: 66 BPM

## 2019-11-25 DIAGNOSIS — N41.9 PROSTATITIS, UNSPECIFIED PROSTATITIS TYPE: ICD-10-CM

## 2019-11-25 DIAGNOSIS — N40.0 BPH WITHOUT URINARY OBSTRUCTION: Primary | ICD-10-CM

## 2019-11-25 LAB
BILIRUB UR QL STRIP: NEGATIVE
GLUCOSE UR-MCNC: NEGATIVE MG/DL
KETONES P FAST UR STRIP-MCNC: NEGATIVE MG/DL
PH UR STRIP: 6 [PH] (ref 4.6–8)
PROT UR QL STRIP: NEGATIVE
SP GR UR STRIP: 1.01 (ref 1–1.03)
UA UROBILINOGEN AMB POC: NORMAL (ref 0.2–1)
URINALYSIS CLARITY POC: CLEAR
URINALYSIS COLOR POC: YELLOW
URINE BLOOD POC: NORMAL
URINE LEUKOCYTES POC: NEGATIVE
URINE NITRITES POC: NEGATIVE

## 2019-11-25 NOTE — PROGRESS NOTES
Chief Complaint   Patient presents with    Prostatitis    Benign Prostatic Hypertrophy       HISTORY OF PRESENT ILLNESS:  Frank Vidal is a 69 y.o. male who presents today for his follow-up for enlarged prostate and elevated PSA. In summary he is getting along very well. Currently he has been on alpha blockers for probably well over 10 years and has been on finasteride for at least 7 years. He urinates very well and today has a postvoid residual by ultrasound of less than 40 cc. He has nocturia x1 and does not have any urinary complaints whatsoever. He sees his primary care physician regularly and his most recent PSA is 1.7 which is basically what it was in 2013. ROS documented on the chart    Past Medical History:   Diagnosis Date    Basal cell carcinoma     BPH (benign prostatic hyperplasia)     Cancer (HCC)     Basal Cell Carcinoma    Depression     Gastrointestinal disorder     Diverticulitis    GERD (gastroesophageal reflux disease)     History of colon polyps 2/21/2019    Hypertension     Inguinal hernia     left side    Other ill-defined conditions(799.89)     BPH    Psychiatric disorder     Aggression, Depression       Past Surgical History:   Procedure Laterality Date    COLONOSCOPY N/A 2/22/2019    COLONOSCOPY, SURVEILLANCE performed by Alexa Monsalve MD at [address] [place] [address] HX COLONOSCOPY      HX HERNIA REPAIR  03/14/2017    HX MALIGNANT SKIN LESION EXCISION         Social History     Tobacco Use    Smoking status: Former Smoker    Smokeless tobacco: Never Used    Tobacco comment: quit 1971   Substance Use Topics    Alcohol use: Yes     Alcohol/week: 1.7 standard drinks     Types: 2 Cans of beer per week     Comment: wine 2-3 x per week with dinner    Drug use: No       Allergies   Allergen Reactions    Naproxen Unable to Obtain     Patient states that he is not allergic.         Family History   Problem Relation Age of Onset    Prostate Cancer Father Current Outpatient Medications   Medication Sig Dispense Refill    lisinopril (PRINIVIL, ZESTRIL) 10 mg tablet TAKE 1 TABLET DAILY 30 Tab 0    atorvastatin (LIPITOR) 20 mg tablet TAKE 1 TABLET DAILY 30 Tab 0    finasteride (PROSCAR) 5 mg tablet Take 1 Tab by mouth daily. 90 Tab 0    alfuzosin SR (UROXATRAL) 10 mg SR tablet Take 1 Tab by mouth daily (after dinner). 90 Tab 1    ibuprofen (MOTRIN IB) 200 mg tablet Take 400 mg by mouth every six (6) hours as needed for Pain.  omeprazole (PRILOSEC) 20 mg capsule Take 1 Cap by mouth daily. 90 Cap 1    acetaminophen (TYLENOL) 500 mg tablet Take  by mouth every six (6) hours as needed for Pain. Lab Results   Component Value Date/Time    Prostate Specific Ag 1.7 11/18/2019 10:30 AM    Prostate Specific Ag 2.48 06/05/2018 02:53 PM    Prostate Specific Ag 2.62 01/05/2017 01:55 PM    Prostate Specific Ag 1.9 10/17/2013              PHYSICAL EXAMINATION:   Visit Vitals  /63 (BP 1 Location: Left arm, BP Patient Position: Sitting)   Pulse 66   Ht 5' 8.5\" (1.74 m)   Wt 155 lb (70.3 kg)   SpO2 97%   BMI 23.22 kg/m²     Constitutional: WDWN, Pleasant and appropriate affect, No acute distress. CV:  No peripheral swelling noted  Respiratory: No respiratory distress or difficulties  Abdomen:  No abdominal masses or tenderness. No CVA tenderness. No inguinal hernias noted.  Male:    Deferred today. His last SAMMIE he revealed a gland of about 80 g with no nodularity or induration. Skin: No jaundice. Neuro/Psych:  Alert and oriented x 3, affect appropriate. Lymphatic:   No enlarged inguinal lymph nodes.          Results for orders placed or performed in visit on 11/25/19   AMB POC URINALYSIS DIP STICK AUTO W/O MICRO   Result Value Ref Range    Color (UA POC) Yellow     Clarity (UA POC) Clear     Glucose (UA POC) Negative Negative    Bilirubin (UA POC) Negative Negative    Ketones (UA POC) Negative Negative    Specific gravity (UA POC) 1.010 1.001 - 1.035    Blood (UA POC) Trace Negative    pH (UA POC) 6.0 4.6 - 8.0    Protein (UA POC) Negative Negative    Urobilinogen (UA POC) 0.2 mg/dL 0.2 - 1    Nitrites (UA POC) Negative Negative    Leukocyte esterase (UA POC) Negative Negative     Bladder scan reveals 38 cc postvoid residual.    REVIEW OF LABS AND IMAGING:     Imaging Report Reviewed? YES     Images Reviewed? YES           Other Lab Data Reviewed? YES         ASSESSMENT:     ICD-10-CM ICD-9-CM    1. BPH without urinary obstruction N40.0 600.00 AMB POC URINALYSIS DIP STICK AUTO W/O MICRO      DC JAVIER,POST-VOID RES,US,NON-IMAGING   2. Prostatitis, unspecified prostatitis type N41.9 601.9 AMB POC URINALYSIS DIP STICK AUTO W/O MICRO      DC JAVIER,POST-VOID RES,US,NON-IMAGING            PLAN / DISCUSSION: : Stable BPH on finasteride which I would recommend he continue indefinitely. I think the Uroxatrol has probably worked all it can for him and I have suggested he try stopping it simply to have one less pill to take. He will do that and I have recommended that since our office is closing, that his primary care physician continue his annual checkup. The patient expresses understanding and agreement of the discussion and plan. Nita French MD on 11/25/2019         Please note:  Voice recognition was used to generate this report, which may have resulted in some phonetic based errors in grammar and contents. Even though attempts were made to correct all the mistakes, some may have been missed, and remained in the body of the document.

## 2019-11-25 NOTE — PROGRESS NOTES
Mr. Olivia Perez has a reminder for a \"due or due soon\" health maintenance. I have asked that he contact his primary care provider for follow-up on this health maintenance.

## 2019-11-25 NOTE — PATIENT INSTRUCTIONS
Prostate Cancer Screening: Care Instructions  Your Care Instructions    The prostate gland is an organ found just below a man's bladder. It is the size and shape of a walnut. It surrounds the tube that carries urine from the bladder out of the body through the penis. This tube is called the urethra. Prostate cancer is the abnormal growth of cells in the prostate. It is the second most common type of cancer in men. (Skin cancer is the most common.)  Most cases of prostate cancer occur in men older than 72. The disease runs in families. And it's more common in -American men. When it's found and treated early, prostate cancer may be cured. But it is not always treated. This is because prostate cancer may not shorten your life, especially if you are older and the cancer is growing slowly. Follow-up care is a key part of your treatment and safety. Be sure to make and go to all appointments, and call your doctor if you are having problems. It's also a good idea to know your test results and keep a list of the medicines you take. What are the screening tests for prostate cancer? The main screening test for prostate cancer is the prostate-specific antigen (PSA) test. This is a blood test that measures how much PSA is in your blood. A high level may mean that you have an enlargement, an infection, or cancer. Along with the PSA test, you may have a digital rectal exam. The digital (finger) rectal exam checks for anything abnormal in your prostate. To do the exam, the doctor puts a lubricated, gloved finger into your rectum. If these tests suggest cancer, you may need a prostate biopsy. How is prostate cancer diagnosed? In a biopsy, the doctor takes small tissue samples from your prostate gland. Another doctor then looks at the tissue under a microscope to see if there are cancer cells, signs of infection, or other problems. The results help diagnose prostate cancer.   What are the pros and cons of screening? Neither a PSA test nor a digital rectal exam can tell you for sure that you do or do not have cancer. But they can help you decide if you need more tests, such as a prostate biopsy. Screening tests may be useful because most men with prostate cancer don't have symptoms. It can be hard to know if you have cancer until it is more advanced. And then it's harder to treat. But having a PSA test can also cause harm. The test may show high levels of PSA that aren't caused by cancer. So you could have a prostate biopsy you didn't need. Or the PSA test might be normal when there is cancer, so a cancer might not be found early. The test can also find cancers that would never have caused a problem during your lifetime. So you might have treatment that was not needed. Prostate cancer usually develops late in life and grows slowly. For many men, it does not shorten their lives. Some experts advise screening only for men who are at high risk. Talk with your doctor to see if screening is right for you. Where can you learn more? Go to http://jimena-catarino.info/. Enter R550 in the search box to learn more about \"Prostate Cancer Screening: Care Instructions. \"  Current as of: December 19, 2018  Content Version: 12.2  © 1670-0603 [employer]. Care instructions adapted under license by [employer] (which disclaims liability or warranty for this information). If you have questions about a medical condition or this instruction, always ask your healthcare professional. [place] [address] any warranty or liability for your use of this information.

## 2019-12-19 RX ORDER — LISINOPRIL 10 MG/1
10 TABLET ORAL DAILY
Qty: 90 TAB | Refills: 1 | Status: SHIPPED | OUTPATIENT
Start: 2019-12-19 | End: 2020-06-24

## 2019-12-31 ENCOUNTER — OFFICE VISIT (OUTPATIENT)
Dept: FAMILY MEDICINE CLINIC | Age: 74
End: 2019-12-31

## 2019-12-31 VITALS
SYSTOLIC BLOOD PRESSURE: 110 MMHG | BODY MASS INDEX: 22.58 KG/M2 | WEIGHT: 149 LBS | HEART RATE: 77 BPM | TEMPERATURE: 98.3 F | RESPIRATION RATE: 16 BRPM | DIASTOLIC BLOOD PRESSURE: 68 MMHG | HEIGHT: 68 IN | OXYGEN SATURATION: 98 %

## 2019-12-31 DIAGNOSIS — R10.30 LOWER ABDOMINAL PAIN: ICD-10-CM

## 2019-12-31 DIAGNOSIS — E78.5 DYSLIPIDEMIA: ICD-10-CM

## 2019-12-31 DIAGNOSIS — M19.90 ARTHRITIS: ICD-10-CM

## 2019-12-31 DIAGNOSIS — R73.01 IMPAIRED FASTING BLOOD SUGAR: ICD-10-CM

## 2019-12-31 DIAGNOSIS — R35.0 URINE FREQUENCY: ICD-10-CM

## 2019-12-31 DIAGNOSIS — N41.9 PROSTATITIS, UNSPECIFIED PROSTATITIS TYPE: Primary | ICD-10-CM

## 2019-12-31 RX ORDER — ATORVASTATIN CALCIUM 20 MG/1
20 TABLET, FILM COATED ORAL
Qty: 90 TAB | Refills: 0 | Status: SHIPPED | OUTPATIENT
Start: 2019-12-31 | End: 2020-03-19 | Stop reason: SDUPTHER

## 2019-12-31 RX ORDER — ALFUZOSIN HYDROCHLORIDE 10 MG/1
10 TABLET, EXTENDED RELEASE ORAL
Qty: 90 TAB | Refills: 1 | Status: SHIPPED | OUTPATIENT
Start: 2019-12-31 | End: 2020-07-29

## 2019-12-31 NOTE — PATIENT INSTRUCTIONS
A Healthy Lifestyle: Care Instructions  Your Care Instructions    A healthy lifestyle can help you feel good, stay at a healthy weight, and have plenty of energy for both work and play. A healthy lifestyle is something you can share with your whole family. A healthy lifestyle also can lower your risk for serious health problems, such as high blood pressure, heart disease, and diabetes. You can follow a few steps listed below to improve your health and the health of your family. Follow-up care is a key part of your treatment and safety. Be sure to make and go to all appointments, and call your doctor if you are having problems. It's also a good idea to know your test results and keep a list of the medicines you take. How can you care for yourself at home? · Do not eat too much sugar, fat, or fast foods. You can still have dessert and treats now and then. The goal is moderation. · Start small to improve your eating habits. Pay attention to portion sizes, drink less juice and soda pop, and eat more fruits and vegetables. ? Eat a healthy amount of food. A 3-ounce serving of meat, for example, is about the size of a deck of cards. Fill the rest of your plate with vegetables and whole grains. ? Limit the amount of soda and sports drinks you have every day. Drink more water when you are thirsty. ? Eat at least 5 servings of fruits and vegetables every day. It may seem like a lot, but it is not hard to reach this goal. A serving or helping is 1 piece of fruit, 1 cup of vegetables, or 2 cups of leafy, raw vegetables. Have an apple or some carrot sticks as an afternoon snack instead of a candy bar. Try to have fruits and/or vegetables at every meal.  · Make exercise part of your daily routine. You may want to start with simple activities, such as walking, bicycling, or slow swimming. Try to be active 30 to 60 minutes every day. You do not need to do all 30 to 60 minutes all at once.  For example, you can exercise 3 times a day for 10 or 20 minutes. Moderate exercise is safe for most people, but it is always a good idea to talk to your doctor before starting an exercise program.  · Keep moving. [place] the lawn, work in the garden, or [place]. Take the stairs instead of the elevator at work. · If you smoke, quit. People who smoke have an increased risk for heart attack, stroke, cancer, and other lung illnesses. Quitting is hard, but there are ways to boost your chance of quitting tobacco for good. ? Use nicotine gum, patches, or lozenges. ? Ask your doctor about stop-smoking programs and medicines. ? Keep trying. In addition to reducing your risk of diseases in the future, you will notice some benefits soon after you stop using tobacco. If you have shortness of breath or asthma symptoms, they will likely get better within a few weeks after you quit. · Limit how much alcohol you drink. Moderate amounts of alcohol (up to 2 drinks a day for men, 1 drink a day for women) are okay. But drinking too much can lead to liver problems, high blood pressure, and other health problems. Family health  If you have a family, there are many things you can do together to improve your health. · Eat meals together as a family as often as possible. · Eat healthy foods. This includes fruits, vegetables, lean meats and dairy, and whole grains. · Include your family in your fitness plan. Most people think of activities such as jogging or tennis as the way to fitness, but there are many ways you and your family can be more active. Anything that makes you breathe hard and gets your heart pumping is exercise. Here are some tips:  ? Walk to do errands or to take your child to school or the bus.  ? Go for a family bike ride after dinner instead of watching TV. Where can you learn more? Go to http://jimena-catarino.info/. Enter J041 in the search box to learn more about \"A Healthy Lifestyle: Care Instructions. \"  Current as of: May 28, 2019  Content Version: 12.2  © 1635-3339 [employer]. Care instructions adapted under license by [email or web address] (which disclaims liability or warranty for this information). If you have questions about a medical condition or this instruction, always ask your healthcare professional. [address] any warranty or liability for your use of this information. Learning About Low-Fat Eating  What is low-fat eating? Most food has some fat in it. Your body needs some fat to be healthy. But some kinds of fats are healthier than others. In a low-fat eating plan, you try to choose healthier fats and eat fewer unhealthy fats. Healthy fats include olive and canola oil. Try to avoid eating too much saturated fat (such as in cheese and meats) and trans fat (a type of fat found in many packaged snack foods and other baked goods). You do not need to cut all fat from your diet. But you can make healthier choices about the types and amount of fat you eat. Even though it is a good idea to choose healthier fats, it is still important to be careful of how much fat you eat, because all fats are high in calories. What are the different types of fats? Unhealthy fats  · Saturated fat. Saturated fats are mostly in animal foods, such as meat and dairy foods. Tropical oils, such as coconut oil, palm oil, and cocoa butter, are also saturated fats. · Trans fat. Trans fats include shortening, partially hydrogenated vegetable oils, and hydrogenated vegetable oils. Trans fats are made when a liquid fat is turned into a solid fat (for example, when corn oil is made into stick margarine). They are in many processed foods, such as cookies, crackers, and snack foods. Healthy fats  · Monounsaturated fat. Monounsaturated fats are liquid at room temperature but get solid when refrigerated.  Eating foods that are high in this fat may help lower your \"bad\" (LDL) cholesterol, keep your \"good\" (HDL) cholesterol level up, and lower your chances of getting coronary artery disease. This fat is found in canola oil, olive oil, peanut oil, olives, avocados, nuts, and nut butters. · Polyunsaturated fat. Polyunsaturated fats are liquid at room temperature. They are in safflower, sunflower, and corn oils. They are also the main fat in seafood. Omega-3 fatty acids are types of polyunsaturated fat. Eating fish may lower your chances of getting coronary artery disease. Fatty fish such as salmon and mackerel contain these healthy fatty acids. So do ground flaxseeds and flaxseed oil, soybeans, walnuts, and seeds. Why cut down on unhealthy fats? Eating foods that contain saturated fats can raise the LDL (\"bad\") cholesterol in your blood. Having a high level of LDL cholesterol increases your chance of hardening of the arteries (atherosclerosis), which can lead to heart disease, heart attack, and stroke. Trans fat raises the level of \"bad\" LDL cholesterol in your blood and may lower the \"good\" HDL cholesterol in your blood. Trans fat can raise your risk of heart disease, heart attack, and stroke. In general:  · No more than 10% of your daily calories should come from saturated fat. This is about 20 grams in a 2,000-calorie diet. · No more than 10% of your daily calories should come from polyunsaturated fat. This is about 20 grams in a 2,000-calorie diet. · Monounsaturated fats can be up to 15% of your daily calories. This is about 25 to 30 grams in a 2,000-calorie diet. If you're not sure how much fat you should be eating or how many calories you need each day to stay at a healthy weight, talk to a registered dietitian. He or she can help you create a plan that's right for you. What can you do to cut down on fat? Foods like cheese, butter, sausage, and desserts can have a lot of unhealthy fats. Try these tips for healthier meals at home and when you eat out.   At home  · Fill up on fruits, vegetables, and whole grains. · Think of meat as a side dish instead of as the main part of your meal.  · When you do eat meat, make it extra-lean ground beef (97% lean), ground turkey breast (without skin added), meats with fat trimmed off before cooking, or skinless chicken. · Try main dishes that use whole wheat pasta, brown rice, dried beans, or vegetables. · Use cooking methods that use little or no fat, such as broiling, steaming, or grilling. Use cooking spray instead of oil. If you use oil, use a monounsaturated oil, such as canola or olive oil. · Read food labels on canned, bottled, or packaged foods. Choose those with little saturated fat and no trans fat. When eating out at a restaurant  · Order foods that are broiled or poached instead of fried or breaded. · Cut back on the amount of butter or margarine that you use on bread. Use small amounts of olive oil instead. · Order sauces, gravies, and salad dressings on the side, and use only a little. · When you order pasta, choose tomato sauce instead of cream sauce. · Ask for salsa with your baked potato instead of sour cream, butter, cheese, or maravilla. Where can you learn more? Go to http://jimena-catarino.info/. Enter S352 in the search box to learn more about \"Learning About Low-Fat Eating. \"  Current as of: November 7, 2018  Content Version: 12.2  © 7439-4597 [employer]. Care instructions adapted under license by [employer] (which disclaims liability or warranty for this information). If you have questions about a medical condition or this instruction, always ask your healthcare professional. [place] [address] any warranty or liability for your use of this information. Prostatitis: Care Instructions  Your Care Instructions    The prostate gland is a small, walnut-shaped organ. It lies just below a man's bladder.  It surrounds the urethra, the tube that carries urine through the penis and out of the body. Prostatitis is a painful condition caused by inflammation or infection of the prostate gland. Sometimes the condition is caused by bacteria, but often the cause is not known. Prostatitis caused by bacteria usually is treated with self-care and antibiotics. Follow-up care is a key part of your treatment and safety. Be sure to make and go to all appointments, and call your doctor if you are having problems. It's also a good idea to know your test results and keep a list of the medicines you take. How can you care for yourself at home? · If your doctor prescribed antibiotics, take them as directed. Do not stop taking them just because you feel better. You need to take the full course of antibiotics. · Take an over-the-counter pain medicine, such as acetaminophen (Tylenol), ibuprofen (Advil, Motrin), or naproxen (Aleve). Be safe with medicines. Read and follow all instructions on the label. · Take warm baths to help soothe pain. · Straining to pass stools can hurt when your prostate is inflamed. Avoid constipation. ? Include fruits, vegetables, beans, and whole grains in your diet each day. These foods are high in fiber. ? Drink plenty of fluids, enough so that your urine is light yellow or clear like water. If you have kidney, heart, or liver disease and have to limit fluids, talk with your doctor before you increase the amount of fluids you drink. ? Get some exercise every day. Build up slowly to 30 to 60 minutes a day on 5 or more days of the week. ? Take a fiber supplement, such as Citrucel or Metamucil, every day if needed. Read and follow all instructions on the label. ? Schedule time each day for a bowel movement. Having a daily routine may help. Take your time and do not strain when having a bowel movement. · Avoid alcohol, caffeine, and spicy foods, especially if they make your symptoms worse. When should you call for help?   Call your doctor now or seek immediate medical care if:    · You have symptoms of a urinary tract infection. These may include:  ? Pain or burning when you urinate. ? A frequent need to urinate without being able to pass much urine. ? Pain in the flank, which is just below the rib cage and above the waist on either side of the back. ? Blood in your urine. ? A fever.    Watch closely for changes in your health, and be sure to contact your doctor if:    · You cannot empty your bladder completely.     · You do not get better as expected. Where can you learn more? Go to http://jimena-catarino.info/. Enter V084 in the search box to learn more about \"Prostatitis: Care Instructions. \"  Current as of: May 28, 2019  Content Version: 12.2  © 6921-8635 [employer]. Care instructions adapted under license by [employer] (which disclaims liability or warranty for this information). If you have questions about a medical condition or this instruction, always ask your healthcare professional. [place] [address] any warranty or liability for your use of this information.

## 2019-12-31 NOTE — PROGRESS NOTES
HISTORY OF PRESENT ILLNESS  Sylvia Aguirre is a 69 y.o. male. HPI: here for follow up. Currently on antibiotic as getting treated for prostatitis. Ligia Ibrahim he was having trouble urination. Pain while urination. Narrow stream.   Seen by urology. Now on antibiotic. Said urine showed e. [place] Master and also recent prostate exam was very painful. Also had left groin pain and left lower back pain. Will be following urology recommendations. Has an appt next week for follow up. Said he had diarrhea being on antibiotic. Advised to take probiotic and take medication with food. No blood in stool. Currently has formed stool. No nausea or vomiting. Appetite is fair. No fever. Noted pending ultrasound. Per patient it is due in feb. Visit Vitals  /68 (BP 1 Location: Left arm, BP Patient Position: Sitting)   Pulse 77   Temp 98.3 °F (36.8 °C) (Oral)   Resp 16   Ht 5' 8\" (1.727 m)   Wt 149 lb (67.6 kg)   SpO2 98%   BMI 22.66 kg/m²     Review medication list, vitals, problem list,allergies. During visit sitting comfortable and not in an acute distress. H/o hyperlipidemia. On statin. No side effects. Has multiple hand joint pain and stiffness. H/o OA. Also triggered finger. Had joint injection in the past. Currently doing home     ROS: see HPI     Physical Exam  Neck:      Comments: No palpable enlarge thyroid gland. Cardiovascular:      Pulses: Normal pulses. Heart sounds: Normal heart sounds. Pulmonary:      Effort: Pulmonary effort is normal.      Breath sounds: Normal breath sounds. Abdominal:      Palpations: Abdomen is soft. Tenderness: There is no tenderness. Musculoskeletal:         General: No swelling. Lymphadenopathy:      Cervical: No cervical adenopathy. Neurological:      Mental Status: He is alert and oriented to person, place, and time. ASSESSMENT and PLAN    ICD-10-CM ICD-9-CM    1. Prostatitis, unspecified prostatitis type N41.9 601.9     on antibiotic. following urology. 2. Urine frequency: following urology. Getting treated for UTI/ prostatitis. R35.0 788.41    3. Lower abdominal pain: improving. R10.30 789.09     left lower side    4. Dyslipidemia: on statin. Recheck labs. E78.5 272.4 LIPID PANEL   5. Impaired fasting blood sugar: in the chart listed as diabetes. Said long time ago had borderline HBA1C. Never been treated. Review log of HBA1C since few years never been diabetic range HBA1C. Will recheck labs and if still falls in prediabetic or normal range will ask to remove diabetic bundle. R73.01 790.21 HEMOGLOBIN A1C WITH EAG      METABOLIC PANEL, COMPREHENSIVE      MICROALBUMIN, UR, RAND W/ MICROALB/CREAT RATIO   6. Arthritis; symptomatic treatment. M19.90 716.90     hand joints. OA. ice application. home exercise. seen hand surgeon in the fast and had shots. now observe.     Pt understood and agree with the plan   Review hM

## 2019-12-31 NOTE — PROGRESS NOTES
1. Have you been to the ER, urgent care clinic since your last visit? Hospitalized since your last visit? [facility] 6/20/19 for finger laceration. 2. Have you seen or consulted any other health care providers outside of the [address] [place] [address] since your last visit? Include any pap smears or colon screening. [facility] - Dr. Risa Stark: 12/16/19. Dr. Dulce Cronin: two weeks ago for foot pain. Chief Complaint   Patient presents with    Cholesterol Problem    Joint Pain    Prostatitis     x 2 weeks - sees Dr. Ilsa Collins [facility]    Groin Pain     x 2 weeks    Anal Pain     x 2 weeks    Medication Evaluation     requests Rx for Prilosec 20 mg     Dm eye exam - Dr. Dejah Martines LOV: within 6 months  Dm Foot exam - not completed per patient not diabetic but sees Dr. Etelvina Ocampo for foot care.

## 2020-06-03 ENCOUNTER — HOSPITAL ENCOUNTER (OUTPATIENT)
Dept: LAB | Age: 75
Discharge: HOME | End: 2020-06-03
Payer: MEDICARE

## 2020-06-03 LAB
ALBUMIN SERPL-MCNC: 4 G/DL (ref 3.4–5)
ALBUMIN/GLOB SERPL: 1.5 {RATIO} (ref 0.8–1.7)
ALP SERPL-CCNC: 86 U/L (ref 45–117)
ALT SERPL-CCNC: 16 U/L (ref 16–61)
ANION GAP SERPL CALC-SCNC: 3 MMOL/L (ref 3–18)
AST SERPL-CCNC: 12 U/L (ref 10–38)
BILIRUB SERPL-MCNC: 0.6 MG/DL (ref 0.2–1)
BUN SERPL-MCNC: 24 MG/DL (ref 7–18)
BUN/CREAT SERPL: 24 (ref 12–20)
CALCIUM SERPL-MCNC: 9.1 MG/DL (ref 8.5–10.1)
CHLORIDE SERPL-SCNC: 111 MMOL/L (ref 100–111)
CHOLEST SERPL-MCNC: 177 MG/DL
CO2 SERPL-SCNC: 30 MMOL/L (ref 21–32)
CREAT SERPL-MCNC: 0.98 MG/DL (ref 0.6–1.3)
CREAT UR-MCNC: 141 MG/DL (ref 30–125)
EST. AVERAGE GLUCOSE BLD GHB EST-MCNC: 123 MG/DL
GLOBULIN SER CALC-MCNC: 2.7 G/DL (ref 2–4)
GLUCOSE SERPL-MCNC: 104 MG/DL (ref 74–99)
HBA1C MFR BLD: 5.9 % (ref 4.2–5.6)
HDLC SERPL-MCNC: 100 MG/DL (ref 40–60)
HDLC SERPL: 1.8 {RATIO} (ref 0–5)
HEMOGLOBIN A1C: 5.9 %
LDLC SERPL CALC-MCNC: 63.8 MG/DL (ref 0–100)
LIPID PROFILE,FLP: ABNORMAL
MICROALBUMIN UR-MCNC: 0.73 MG/DL (ref 0–3)
MICROALBUMIN/CREAT UR-RTO: 5 MG/G (ref 0–30)
POTASSIUM SERPL-SCNC: 4.9 MMOL/L (ref 3.5–5.5)
PROT SERPL-MCNC: 6.7 G/DL (ref 6.4–8.2)
SODIUM SERPL-SCNC: 144 MMOL/L (ref 136–145)
TRIGL SERPL-MCNC: 66 MG/DL (ref ?–150)
VLDLC SERPL CALC-MCNC: 13.2 MG/DL

## 2020-06-03 PROCEDURE — 82672 ASSAY OF ESTROGEN: CPT

## 2020-06-03 PROCEDURE — 36415 COLL VENOUS BLD VENIPUNCTURE: CPT

## 2020-06-03 PROCEDURE — 84403 ASSAY OF TOTAL TESTOSTERONE: CPT

## 2020-06-03 PROCEDURE — 83036 HEMOGLOBIN GLYCOSYLATED A1C: CPT

## 2020-06-03 PROCEDURE — 80061 LIPID PANEL: CPT

## 2020-06-03 PROCEDURE — 80053 COMPREHEN METABOLIC PANEL: CPT

## 2020-06-03 PROCEDURE — 82043 UR ALBUMIN QUANTITATIVE: CPT

## 2020-06-03 PROCEDURE — 84146 ASSAY OF PROLACTIN: CPT

## 2020-06-03 NOTE — PROGRESS NOTES
Patient identified with 2 identifiers (name and ). Patient aware of Prediabetic A1C. Diet modification. Other labs fairly ok.  Further discussion on follow up visit

## 2020-06-04 LAB — PROLACTIN SERPL-MCNC: 6 NG/ML

## 2020-06-05 LAB
ESTROGEN SERPL-MCNC: 179 PG/ML (ref 40–115)
TESTOST FREE SERPL-MCNC: 12.5 PG/ML (ref 6.6–18.1)
TESTOST SERPL-MCNC: 448 NG/DL (ref 264–916)

## 2020-06-24 RX ORDER — LISINOPRIL 10 MG/1
TABLET ORAL
Qty: 90 TAB | Refills: 3 | Status: SHIPPED | OUTPATIENT
Start: 2020-06-24 | End: 2021-06-02 | Stop reason: SDUPTHER

## 2020-06-24 RX ORDER — OMEPRAZOLE 20 MG/1
CAPSULE, DELAYED RELEASE ORAL
Qty: 90 CAP | Refills: 3 | Status: SHIPPED | OUTPATIENT
Start: 2020-06-24 | End: 2022-06-24 | Stop reason: SDUPTHER

## 2020-07-01 ENCOUNTER — OFFICE VISIT (OUTPATIENT)
Dept: FAMILY MEDICINE CLINIC | Age: 75
End: 2020-07-01

## 2020-07-01 VITALS
WEIGHT: 153 LBS | RESPIRATION RATE: 16 BRPM | BODY MASS INDEX: 23.19 KG/M2 | HEIGHT: 68 IN | SYSTOLIC BLOOD PRESSURE: 112 MMHG | TEMPERATURE: 98.1 F | DIASTOLIC BLOOD PRESSURE: 68 MMHG | OXYGEN SATURATION: 97 % | HEART RATE: 69 BPM

## 2020-07-01 DIAGNOSIS — I10 ESSENTIAL HYPERTENSION: ICD-10-CM

## 2020-07-01 DIAGNOSIS — N40.1 BENIGN PROSTATIC HYPERPLASIA WITH LOWER URINARY TRACT SYMPTOMS, SYMPTOM DETAILS UNSPECIFIED: ICD-10-CM

## 2020-07-01 DIAGNOSIS — Z71.89 ADVANCED CARE PLANNING/COUNSELING DISCUSSION: ICD-10-CM

## 2020-07-01 DIAGNOSIS — M54.9 DISCOMFORT OF BACK: ICD-10-CM

## 2020-07-01 DIAGNOSIS — R20.0 NUMBNESS: ICD-10-CM

## 2020-07-01 DIAGNOSIS — R73.01 IMPAIRED FASTING BLOOD SUGAR: ICD-10-CM

## 2020-07-01 DIAGNOSIS — Z00.00 MEDICARE ANNUAL WELLNESS VISIT, SUBSEQUENT: Primary | ICD-10-CM

## 2020-07-01 DIAGNOSIS — K21.9 GASTROESOPHAGEAL REFLUX DISEASE WITHOUT ESOPHAGITIS: ICD-10-CM

## 2020-07-01 DIAGNOSIS — M54.2 NECK PAIN: ICD-10-CM

## 2020-07-01 DIAGNOSIS — E78.5 DYSLIPIDEMIA: ICD-10-CM

## 2020-07-01 DIAGNOSIS — R45.89 FEELING SAD: ICD-10-CM

## 2020-07-01 DIAGNOSIS — G47.9 TROUBLE IN SLEEPING: ICD-10-CM

## 2020-07-01 RX ORDER — CITALOPRAM 10 MG/1
10 TABLET ORAL DAILY
Qty: 30 TAB | Refills: 1 | Status: SHIPPED | OUTPATIENT
Start: 2020-07-01 | End: 2020-12-21

## 2020-07-01 RX ORDER — LANOLIN ALCOHOL/MO/W.PET/CERES
6 CREAM (GRAM) TOPICAL
COMMUNITY
End: 2020-12-21

## 2020-07-01 NOTE — ACP (ADVANCE CARE PLANNING)
Advance Care Planning       Advance Care Planning (ACP) Physician/NP/PA (Provider) Conversation        Date of ACP Conversation: 7/1/2020    Conversation Conducted with:   Patient with Decision Making [address]  [facility] Decision Maker:    Current Designated Health Care Decision Maker:   Primary Decision Maker (Active): Kameron Crawford - Spouse - [phone]        Note: If the relationship of these Decision-Makers to the patient does NOT follow your state's Next of Kin hierarchy, recommend that patient complete ACP document that meets state-specific requirements to allow them to act on the patient's behalf when appropriate. Care Preferences:    Hospitalization: \"If your health worsens and it becomes clear that your chance of recovery is unlikely, what would your preference be regarding hospitalization? \"  If the patient would want hospitalization, answer \"yes\". If the patient would prefer comfort-focused treatment without hospitalization, answer \"no\". no      Ventilation: \"If you were in your present state of health and suddenly became very ill and were unable to breathe on your own, what would your preference be about the use of a ventilator (breathing machine) if it was available to you? \"    If patient would desire the use of a ventilator (breathing machine), answer \"yes\", if not answer \"no\":no    \"If your health worsens and it becomes clear that your chance of recovery is unlikely, what would your preference be about the use of a ventilator (breathing machine) if it was available to you? \"   no      Resuscitation:  \"CPR works best to restart the heart when there is a sudden event, like a heart attack, in someone who is otherwise healthy. Unfortunately, CPR does not typically restart the heart for people who have serious health conditions or who are very sick. \"    \"In the event your heart stopped as a result of an underlying serious health condition, would you want attempts to be made to restart your heart (answer \"yes\" for attempt to resuscitate) or would you prefer a natural death (answer \"no\" for do not attempt to resuscitate)? \"   no    NOTE: If the patient has a valid advance directive AND provides care preference(s) that are inconsistent with that prior directive, advise the patient to consider either: creating a new advance directive that complies with state-specific requirements; or, if that is not possible, orally revoking that prior directive in accordance with state-specific requirements, which must be documented in the EHR.     Conversation Outcomes / Follow-Up Plan:   Recommended completion of Advance Directive      Length of Voluntary ACP Conversation in minutes:  16 minutes      Jamaica Mueller MD

## 2020-07-01 NOTE — PROGRESS NOTES
This is the Subsequent Medicare Annual Wellness Exam, performed 12 months or more after the Initial AWV or the last Subsequent AWV    I have reviewed the patient's medical history in detail and updated the computerized patient record. History     Patient Active Problem List   Diagnosis Code    Benign prostatic hyperplasia N40.0    Groin pain R10.30    Urinary frequency R35.0    Hernia K46.9    Left lower quadrant pain R10.32    Groin fluid collection R18.8    Hearing problem of both ears H91.93    Arthritis M19.90    Hyperlipidemia E78.5    Essential hypertension I10    History of colon polyps Z86.010    Kidney stone N20.0    Type II or unspecified type diabetes mellitus without mention of complication, not stated as uncontrolled E11.9     Past Medical History:   Diagnosis Date    Basal cell carcinoma     BPH (benign prostatic hyperplasia)     Cancer (HCC)     Basal Cell Carcinoma    Depression     Gastrointestinal disorder     Diverticulitis    GERD (gastroesophageal reflux disease)     History of colon polyps 2/21/2019    Hypertension     Inguinal hernia     left side    Other ill-defined conditions(799.89)     BPH    Psychiatric disorder     Aggression, Depression      Past Surgical History:   Procedure Laterality Date    COLONOSCOPY N/A 2/22/2019    COLONOSCOPY, SURVEILLANCE performed by Satinder Ruiz MD at [address] [place] [address] HX COLONOSCOPY      HX HERNIA REPAIR  03/14/2017    HX MALIGNANT SKIN LESION EXCISION       Current Outpatient Medications   Medication Sig Dispense Refill    melatonin 3 mg tablet Take 6 mg by mouth nightly.  OTHER L-thiamine 25 mg      citalopram (CELEXA) 10 mg tablet Take 1 Tab by mouth daily. 30 Tab 1    omeprazole (PRILOSEC) 20 mg capsule TAKE 1 CAPSULE DAILY 90 Cap 3    lisinopriL (PRINIVIL, ZESTRIL) 10 mg tablet TAKE 1 TABLET DAILY 90 Tab 3    atorvastatin (LIPITOR) 20 mg tablet Take 1 Tab by mouth nightly.  90 Tab 1    finasteride (PROSCAR) 5 mg tablet TAKE 1 TABLET DAILY 90 Tab 1    alfuzosin SR (UROXATRAL) 10 mg SR tablet Take 1 Tab by mouth daily (after dinner). 90 Tab 1    allergy injection       sildenafiL, pulm. hypertension, (REVATIO) 20 mg tablet Take 1 Tab by mouth as needed for Other (ED). Take up to 5 tablets as needed. Do not exceed 5 tablets. Take on an empty stomach. 90 Tab 3    ibuprofen (MOTRIN IB) 200 mg tablet Take 400 mg by mouth every six (6) hours as needed for Pain.  acetaminophen (TYLENOL) 500 mg tablet Take  by mouth every six (6) hours as needed for Pain. No Known Allergies    Family History   Problem Relation Age of Onset    Prostate Cancer Father      Social History     Tobacco Use    Smoking status: Former Smoker    Smokeless tobacco: Never Used    Tobacco comment: quit 1971   Substance Use Topics    Alcohol use: Yes     Alcohol/week: 1.7 standard drinks     Types: 2 Cans of beer per week     Comment: wine 2-3 x per week with dinner       Depression Risk Factor Screening:     3 most recent PHQ Screens 7/1/2020   Little interest or pleasure in doing things Not at all   Feeling down, depressed, irritable, or hopeless Not at all   Total Score PHQ 2 0       Alcohol Risk Factor Screening (MALE > 65): Do you average more 1 drink per night or more than 7 drinks a week: Yes    In the past three months have you have had more than 4 drinks containing alcohol on one occasion: No      Functional Ability and Level of Safety:   Hearing: Hearing is good. wearing hearing aid. Activities of Daily Living: The home contains: handrails  Patient does total self care     Ambulation: with no difficulty     Fall Risk:  Fall Risk Assessment, last 12 mths 7/1/2020   Able to walk? Yes   Fall in past 12 months?  No     Abuse Screen:  Patient is not abused       Cognitive Screening   Has your family/caregiver stated any concerns about your memory: no         Patient Care Team   Patient Care Team:  Donavan Mendoza MD as PCP - General (Family Practice)  Claudette Correa MD as PCP - [facility]  Juan Lagunas MD (Orthopedic Surgery)  Ulises Pickard MD (Otolaryngology)  Baron Kim MD as Physician (Urology)  Ulises Pickard MD (Otolaryngology)    Assessment/Plan   Education and counseling provided:  Are appropriate based on today's review and evaluation  Discussed 5-year health plan with patient. Discussed advance care directive. See ACP note from the chart. He was given form for advance care directive as he wants to be DNR but will make sure with the paperwork after talking to his daughter and wife  Diagnoses and all orders for this visit:      1.  Medicare annual wellness visit, subsequent        Health Maintenance Due   Topic Date Due    Foot Exam Q1  08/20/1955    Eye Exam Retinal or Dilated  08/20/1955    Shingrix Vaccine Age 50> (1 of 2) 08/20/1997    GLAUCOMA SCREENING Q2Y  08/20/2012    Medicare Yearly Exam  02/07/2020     Had eye exam done with Dr. Vicente Guaman to talk to the pharmacist regarding shingrix  PSa managed by urology

## 2020-07-01 NOTE — PROGRESS NOTES
HISTORY OF PRESENT ILLNESS  Bessie Guzmán is a 69 y.o. male. HPI: Here for follow-up. History of hypertension. Taking medication with compliance. Asymptomatic. Vitals been stable. Prediabetes. Working on lifestyle modification but the exercise has been low as dreams are closed due to pandemic situation. Discussed the healthy lifestyle and walking as an exercise. History of prostatitis/BPH/low testosterone. Following urology. Last PSA within normal limit. Will follow the recommendations. History of dyslipidemia. On statin. No side effects. Taking medication with compliance. Lately complaining of feeling sad. Trouble sleeping. No unusual fatigue. No easy crying or hopeless feeling. No thoughts of hurting himself or someone else. No appetite or weight changes. Prior history of depression. 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 Helped him. Currently some social stressor is hard for him to see his wife help getting deteriorating, pandemic situation etc.  Agreed to restart the Celexa. Wants to put hold on counseling at this time. Denies any headache, dizziness, no chest pain or trouble breathing, no arm or leg weakness. No nausea or vomiting, no weight or appetite changes . No urine or bowel complains, no palpitation, no diaphoresis. No abdominal pain. No cold or cough. No leg swelling. No fever. No sleep trouble. Visit Vitals  /68 (BP 1 Location: Right arm, BP Patient Position: Sitting)   Pulse 69   Temp 98.1 °F (36.7 °C) (Oral)   Resp 16   Ht 5' 8\" (1.727 m)   Wt 153 lb (69.4 kg)   SpO2 97%   BMI 23.26 kg/m²     Review medication list, vitals, problem list,allergies. Discussed labs.   Lab Results   Component Value Date/Time    WBC 5.9 02/22/2017 03:00 PM    HGB 14.1 02/22/2017 03:00 PM    HCT 42.0 02/22/2017 03:00 PM    PLATELET 032 87/71/9234 03:00 PM    MCV 93.1 02/22/2017 03:00 PM     Lab Results   Component Value Date/Time    Sodium 144 06/03/2020 08:08 AM    Potassium 4.9 06/03/2020 08:08 AM    Chloride 111 06/03/2020 08:08 AM    CO2 30 06/03/2020 08:08 AM    Anion gap 3 06/03/2020 08:08 AM    Glucose 104 (H) 06/03/2020 08:08 AM    BUN 24 (H) 06/03/2020 08:08 AM    Creatinine 0.98 06/03/2020 08:08 AM    BUN/Creatinine ratio 24 (H) 06/03/2020 08:08 AM    GFR est AA >60 06/03/2020 08:08 AM    GFR est non-AA >60 06/03/2020 08:08 AM    Calcium 9.1 06/03/2020 08:08 AM    Bilirubin, total 0.6 06/03/2020 08:08 AM    Alk. phosphatase 86 06/03/2020 08:08 AM    Protein, total 6.7 06/03/2020 08:08 AM    Albumin 4.0 06/03/2020 08:08 AM    Globulin 2.7 06/03/2020 08:08 AM    A-G Ratio 1.5 06/03/2020 08:08 AM    ALT (SGPT) 16 06/03/2020 08:08 AM    AST (SGOT) 12 06/03/2020 08:08 AM     Lab Results   Component Value Date/Time    Cholesterol, total 177 06/03/2020 08:08 AM    HDL Cholesterol 100 (H) 06/03/2020 08:08 AM    LDL, calculated 63.8 06/03/2020 08:08 AM    VLDL, calculated 13.2 06/03/2020 08:08 AM    Triglyceride 66 06/03/2020 08:08 AM    CHOL/HDL Ratio 1.8 06/03/2020 08:08 AM     Lab Results   Component Value Date/Time    TSH 1.37 10/04/2018 08:23 AM     Lab Results   Component Value Date/Time    Hemoglobin A1c 5.9 (H) 06/03/2020 08:09 AM     No results found for: Viri [place], XQVID3, XQVID, VD3RIA, [id number]    Lab Results   Component Value Date/Time    Microalbumin/Creat ratio (mg/g creat) 5 06/03/2020 08:10 AM    Microalbumin,urine random 0.73 06/03/2020 08:10 AM     Lab Results   Component Value Date/Time    Testosterone 448 06/03/2020 08:14 AM   Total estrogen level elevated  Lab Results   Component Value Date/Time    Prolactin 6.0 06/03/2020 08:14 AM   Was having ongoing problem with the neck pain and lower back discomfort. Currently since last 3 months having a right side of the neck pain. Said certain movement makes it worse. Pain present most of the day. Mild in intensity at this time. No headache or any dizziness.   No upper extremity tingling numbness or weakness  Except having bilateral hand numbness on and off and sometimes wakes him up from the sleep. No direct injury or any pulled or pushing events. Does not recall how the problem started. Taken ibuprofen as needed. Helped little. Tried heating pad but not much help. Range of motion fairly stable on the neck area. Again bilateral hand numbness. Going on since long time. No tingling. Probably carpal tunnel. Not tried any medication at this time. ROS: See HPI    Physical Exam  Constitutional:       General: He is not in acute distress. Cardiovascular:      Rate and Rhythm: Normal rate and regular rhythm. Heart sounds: Normal heart sounds. Abdominal:      General: Bowel sounds are normal.      Palpations: Abdomen is soft. Tenderness: There is no abdominal tenderness. Musculoskeletal:         General: No swelling. Comments: No point tenderness over cervical spine. No tenderness over trapezius muscle area. Range of motion over the cervical spine within normal limit. No discomfort with touching chin to the chest.  Some discomfort with turning head side-to-side. Right shoulder range of motion within normal limits   Skin:     Comments: Has a scattered tiny 5 healed scar from rash. No open skin at this time. Looks dry. No itching or tenderness over the affected area. Probably he had shingle. Neurological:      Mental Status: He is oriented to person, place, and time. Psychiatric:         Behavior: Behavior normal.         ASSESSMENT and PLAN    ICD-10-CM ICD-9-CM    1. Impaired fasting blood sugar: For now diet modification and lifestyle modification. Will observe R73.01 790.21    2. Dyslipidemia: Taking medication. No side effects. Lifestyle modification and diet modification. Due to pandemic situation his exercise been on hold. Right to discuss to be active and walking. E78.5 272.4    3. Neck pain: Probably musculoskeletal pain.   For now Voltaren gel as needed. Said he does not want new prescription as he has it at home M54.2 723.1    4. Essential hypertension: Taking medication with compliance. Fairly stable. Asymptomatic. Low-salt diet and continue current dose of medication I10 401.9    5. Gastroesophageal reflux disease without esophagitis: Fairly stable. K21.9 530.81    6. Benign prostatic hyperplasia with lower urinary tract symptoms, symptom details unspecified: Following urology. Also history of prostatitis. On and off antibiotic required. PSA within normal limit. Will follow the recommendations. Advised him to make a follow-up appointment as the recommendation as he has canceled the appointment. N40.1 600.01    7. Discomfort of back: Fairly stable at this time. Again symptomatic treatment as needed. Heating pad. Voltaren gel as needed M54.9 724.5    8. Numbness: Mainly over the by lateral hand. On and off. Possible carpal tunnel. Advised him ice application, taking Tylenol or NSAIDs as needed with the food. Also advised wrist support but he will think about it given home exercise R20.0 782.0     bilateral hand numbness. carpal tunel     9. Feeling sad: Social situation as hard for him to see his wife's deteriorating health, ongoing pandemic situation, deems are closed and no other socialization. Prior history of depression as well while his mother was sick and had a stroke and he was a caretaker. Stressful work. In the past he has taken the Celexa. Ask asking if he can take it again. No suicidal ideation. No appetite or weight changes. Will be with arrival of Celexa. He wants to put counseling on hold at this time. Discussed the medication side effects and follow-up next visit R45.89 300.4 citalopram (CELEXA) 10 mg tablet   10. Trouble in sleeping: Taking melatonin as needed. Will try Celexa and follow-up next visit. Sleep hygiene discussed. G47.9 780.50 citalopram (CELEXA) 10 mg tablet   11.  Medicare annual wellness visit, subsequent Z00.00 V70.0    12. Advanced care planning/counseling discussion Z71.89 V65.49    Patient understood and agree with above plan. Review health maintenance. He will talk to the pharmacist about Shingrix vaccine. He is up to date with the PSA check and the colonoscopy. Has been having a foot exam with Dr. Burroughs Earing exam with Dr. Jaquelin Amor  Follow-up and Dispositions    · Return in about 6 weeks (around 8/12/2020).

## 2020-07-01 NOTE — PROGRESS NOTES
1. Have you been to the ER, urgent care clinic since your last visit? Hospitalized since your last visit? No    2. Have you seen or consulted any other health care providers outside of the [address] [place] [address] since your last visit? Include any pap smears or colon screening. No       Have you been diagnosed with, tested for, or told that you are suspected of having COVID-19 (coronovirus)? No  Have you had a fever or taken medication to treat a fever in the past 72 hours? No  Have you had a cough, SOB, or flu-like symptoms within the past 3 days? No  Have you had direct contact with someone who tested positive for COVID-19 within the past 14 days? No  Do you have a household member with flu-like symptoms, including fever, cough, or SOB? No  Do you currently have flu-like symptoms including fever, cough, or SOB?  No

## 2020-07-01 NOTE — PATIENT INSTRUCTIONS
Carpal Tunnel Syndrome: Exercises Introduction Here are some examples of exercises for you to try. The exercises may be suggested for a condition or for rehabilitation. Start each exercise slowly. Ease off the exercises if you start to have pain. You will be told when to start these exercises and which ones will work best for you. Warm-up stretches When you no longer have pain or numbness, you can do exercises to help prevent carpal tunnel syndrome from coming back. Do not do any stretch or movement that is uncomfortable or painful. 1. Rotate your wrist up, down, and from side to side. Repeat 4 times. 2. Stretch your fingers far apart. Relax them, and then stretch them again. Repeat 4 times. 3. Stretch your thumb by pulling it back gently, holding it, and then releasing it. Repeat 4 times. How to do the exercises Prayer stretch 1. Start with your palms together in front of your chest just below your chin. 2. Slowly lower your hands toward your waistline, keeping your hands close to your stomach and your palms together until you feel a mild to moderate stretch under your forearms. 3. Hold for at least 15 to 30 seconds. Repeat 2 to 4 times. Wrist flexor stretch 1. Extend your arm in front of you with your palm up. 2. Bend your wrist, pointing your hand toward the floor. 3. With your other hand, gently bend your wrist farther until you feel a mild to moderate stretch in your forearm. 4. Hold for at least 15 to 30 seconds. Repeat 2 to 4 times. Wrist extensor stretch 1. Repeat steps 1 through 4 of the stretch above, but begin with your extended hand palm down. Follow-up care is a key part of your treatment and safety. Be sure to make and go to all appointments, and call your doctor if you are having problems. It's also a good idea to know your test results and keep a list of the medicines you take. Where can you learn more? Go to http://www.Quincee.com/ Enter F816 in the search box to learn more about \"Carpal Tunnel Syndrome: Exercises. \" Current as of: March 2, 2020               Content Version: 12.5 © 2006-2020 [employer]. Care instructions adapted under license by [employer] (which disclaims liability or warranty for this information). If you have questions about a medical condition or this instruction, always ask your healthcare professional. [address] any warranty or liability for your use of this information. Neck Arthritis: Exercises Introduction Here are some examples of exercises for you to try. The exercises may be suggested for a condition or for rehabilitation. Start each exercise slowly. Ease off the exercises if you start to have pain. You will be told when to start these exercises and which ones will work best for you. How to do the exercises Neck stretches to the side 1. This stretch works best if you keep your shoulder down as you lean away from it. To help you remember to do this, start by relaxing your shoulders and lightly holding on to your thighs or your chair. 2. Tilt your head toward your shoulder and hold for 15 to 30 seconds. Let the weight of your head stretch your muscles. 3. Repeat 2 to 4 times toward each shoulder. Chin tuck 1. Lie on the floor with a rolled-up towel under your neck. Your head should be touching the floor. 2. Slowly bring your chin toward your chest. 
3. Hold for a count of 6, and then relax for up to 10 seconds. 4. Repeat 8 to 12 times. Active cervical rotation 1. Sit in a firm chair, or stand up straight. 2. Keeping your chin level, turn your head to the right, and hold for 15 to 30 seconds. 3. Turn your head to the left and hold for 15 to 30 seconds. 4. Repeat 2 to 4 times to each side. Shoulder blade squeeze 1. While standing, squeeze your shoulder blades together. 2. Do not raise your shoulders up as you are squeezing. 3. Hold for 6 seconds. 4. Repeat 8 to 12 times. Shoulder rolls 1. Sit comfortably with your feet shoulder-width apart. You can also do this exercise standing up. 2. Roll your shoulders up, then back, and then down in a smooth, circular motion. 3. Repeat 2 to 4 times. Follow-up care is a key part of your treatment and safety. Be sure to make and go to all appointments, and call your doctor if you are having problems. It's also a good idea to know your test results and keep a list of the medicines you take. Where can you learn more? Go to http://jimena-catarino.info/ Enter P504 in the search box to learn more about \"Neck Arthritis: Exercises. \" Current as of: March 2, 2020               Content Version: 12.5 © 0841-5440 Healthwise, Incorporated. Care instructions adapted under license by [employer] (which disclaims liability or warranty for this information). If you have questions about a medical condition or this instruction, always ask your healthcare professional. [place] [address] any warranty or liability for your use of this information. Medicare Wellness Visit, Male The best way to live healthy is to have a lifestyle where you eat a well-balanced diet, exercise regularly, limit alcohol use, and quit all forms of tobacco/nicotine, if applicable. Regular preventive services are another way to keep healthy. Preventive services (vaccines, screening tests, monitoring & exams) can help personalize your care plan, which helps you manage your own care. Screening tests can find health problems at the earliest stages, when they are easiest to treat. [facility] follows the current, evidence-based guidelines published by the [place] (USPSTF) when recommending preventive services for our patients.  Because we follow these guidelines, sometimes recommendations change over time as research supports it. (For example, a prostate screening blood test is no longer routinely recommended for men with no symptoms). Of course, you and your doctor may decide to screen more often for some diseases, based on your risk and co-morbidities (chronic disease you are already diagnosed with). Preventive services for you include: - Medicare offers their members a free annual wellness visit, which is time for you and your primary care provider to discuss and plan for your preventive service needs. Take advantage of this benefit every year! 
-All adults over age 72 should receive the recommended pneumonia vaccines. Current USPSTF guidelines recommend a series of two vaccines for the best pneumonia protection.  
-All adults should have a flu vaccine yearly and tetanus vaccine every 10 years. 
-All adults age 48 and older should receive the shingles vaccines (series of two vaccines). -All adults age 38-68 who are overweight should have a diabetes screening test once every three years.  
-Other screening tests & preventive services for persons with diabetes include: an eye exam to screen for diabetic retinopathy, a kidney function test, a foot exam, and stricter control over your cholesterol.  
-Cardiovascular screening for adults with routine risk involves an electrocardiogram (ECG) at intervals determined by the provider.  
-Colorectal cancer screening should be done for adults age 54-65 with no increased risk factors for colorectal cancer. There are a number of acceptable methods of screening for this type of cancer. Each test has its own benefits and drawbacks. Discuss with your provider what is most appropriate for you during your annual wellness visit.  The different tests include: colonoscopy (considered the best screening method), a fecal occult blood test, a fecal DNA test, and sigmoidoscopy. 
-All adults born between [facility] should be screened once for Hepatitis C. 
 -An Abdominal Aortic Aneurysm (AAA) Screening is recommended for men age 73-68 who has ever smoked in their lifetime. Here is a list of your current Health Maintenance items (your personalized list of preventive services) with a due date: 
Health Maintenance Due Topic Date Due  
 Diabetic Foot Care  08/20/1955 Lesly Francois Eye Exam  08/20/1955  Shingles Vaccine (1 of 2) 08/20/1997  Glaucoma Screening   08/20/2012 Lesly Francois Annual Well Visit  02/07/2020 Advance Directives: Care Instructions Overview An advance directive is a legal way to state your wishes at the end of your life. It tells your family and your doctor what to do if you can't say what you want. There are two main types of advance directives. You can change them any time your wishes change. Living will. This form tells your family and your doctor your wishes about life support and other treatment. The form is also called a declaration. Medical power of . This form lets you name a person to make treatment decisions for you when you can't speak for yourself. This person is called a health care agent (health care proxy, health care surrogate). The form is also called a durable power of  for health care. If you do not have an advance directive, decisions about your medical care may be made by a family member, or by a doctor or a  who doesn't know you. It may help to think of an advance directive as a gift to the people who care for you. If you have one, they won't have to make tough decisions by themselves. Follow-up care is a key part of your treatment and safety. Be sure to make and go to all appointments, and call your doctor if you are having problems. It's also a good idea to know your test results and keep a list of the medicines you take. What should you include in an advance directive? Many states have a unique advance directive form.  (It may ask you to address specific issues.) Or you might use a universal form that's approved by many states. If your form doesn't tell you what to address, it may be hard to know what to include in your advance directive. Use the questions below to help you get started. · Who do you want to make decisions about your medical care if you are not able to? · What life-support measures do you want if you have a serious illness that gets worse over time or can't be cured? · What are you most afraid of that might happen? (Maybe you're afraid of having pain, losing your independence, or being kept alive by machines.) · Where would you prefer to die? (Your home? A hospital? A nursing home?) · Do you want to donate your organs when you die? · Do you want certain Sikhism practices performed before you die? When should you call for help? Be sure to contact your doctor if you have any questions. Where can you learn more? Go to http://jimena-catarino.info/ Enter R264 in the search box to learn more about \"Advance Directives: Care Instructions. \" Current as of: December 9, 2019               Content Version: 12.5 © 2174-6925 Healthwise, Incorporated. Care instructions adapted under license by [employer] (which disclaims liability or warranty for this information). If you have questions about a medical condition or this instruction, always ask your healthcare professional. [place] [address] any warranty or liability for your use of this information. Deciding About Being on a Ventilator When You Have a Terminal Illness How can you decide about being on a ventilator when you have a terminal illness? Your Care Instructions A ventilator is a life-support machine that helps you breathe if you can no longer breathe on your own. The machine provides oxygen to your lungs through a tube.  The tube enters your mouth and goes down your throat to your lungs. Most people on ventilators have to be fed through another tube that goes into the stomach. You may feel that being on a ventilator would prevent a \"natural\" death or would keep you alive longer than necessary. Or you may feel that being on a ventilator would extend your life so you can do certain things, such as saying good-bye to loved ones. The decision about whether to be on a ventilator is a personal one. Be sure to talk it over with your doctor and loved ones. Follow-up care is a key part of your treatment and safety. Be sure to make and go to all appointments, and call your doctor if you are having problems. It's also a good idea to know your test results and keep a list of the medicines you take. Why might you want to be on a ventilator? · You think you may be able to return to your normal activities. · You need help breathing because of a short illness or a problem that is not related to your terminal illness. · You would like more time to say good-bye. · You feel that there are more benefits than risks. Why might you not want to be on a ventilator? · You have other long-term health problems. · You may not be able to return to your normal activities. · You want a calm, peaceful death. You do not want to spend the rest of your life on a ventilator. · You feel that there are more risks than benefits. When should you call for help? Be sure to contact your doctor if: 
· You want to learn more about being on a ventilator. · You change your mind about being on a ventilator. Where can you learn more? Go to http://www.gray.com/ Enter H798 in the search box to learn more about \"Deciding About Being on a Ventilator When You Have a Terminal Illness. \" Current as of: December 9, 2019               Content Version: 12.5 © 2154-3622 Healthwise, Incorporated.   
Care instructions adapted under license by [employer] (which disclaims liability or warranty for this information). If you have questions about a medical condition or this instruction, always ask your healthcare professional. [address] any warranty or liability for your use of this information. Deciding About IV Fluids or Tube Feedings When You Have a Terminal Illness How can you decide about having IV fluids or tube feedings when you have a terminal illness? Your Care Instructions IV fluids and tube feedings can be used when you are no longer able to eat or drink by mouth. IV fluids are given through a needle placed in a vein. Liquid food can be given through a tube that goes down your nose into your stomach. Or it may be given through a tube that is surgically placed in your belly. You get to decide if you want to have IV fluids or tube feedings if you can no longer eat or drink on your own. It will not cure your illness, and it can be uncomfortable. But it may also make you feel better or live longer. Without IV fluids and tube feedings, your body will slow down naturally. Most likely, you will not feel hungry. And your doctor will take steps to keep you comfortable until you die. The decision about whether to have IV fluids and tube feedings is a personal one. You may decide that you would want one but not the other. Be sure to talk it over with your doctor and loved ones. Follow-up care is a key part of your treatment and safety. Be sure to make and go to all appointments, and call your doctor if you are having problems. It's also a good idea to know your test results and keep a list of the medicines you take. Why might you want IV fluids or tube feedings? · It may help you recover from a short illness or injury. · It may help improve the quality of your remaining time. · You believe that every possible step should be taken to preserve life, regardless of quality of life. · There is hope that there is or will soon be a cure for your condition. Why might you not want IV fluids or tube feedings? · It cannot cure a terminal illness. · It may prolong your life, but it would not make you more comfortable or increase the quality of the rest of your life. · There are more risks than benefits. Risks include infection, pneumonia, and digestive problems such as diarrhea. · You do not wish to be kept alive artificially. When should you call for help? Be sure to contact your doctor if: 
· You want more information about IV fluids and tube feedings. · You want to change your decision about receiving IV fluids and tube feedings. Where can you learn more? Go to http://jimena-catarino.info/ Enter U526 in the search box to learn more about \"Deciding About IV Fluids or Tube Feedings When You Have a Terminal Illness. \" Current as of: December 9, 2019               Content Version: 12.5 © 2777-3625 [employer]. Care instructions adapted under license by [employer] (which disclaims liability or warranty for this information). If you have questions about a medical condition or this instruction, always ask your healthcare professional. [place] [address] any warranty or liability for your use of this information. Deciding About Life-Prolonging Treatment How can you decide about life-prolonging treatment? What is life-prolonging treatment? There are many kinds of treatment that can help you live longer. These may be needed for only a short time until your illness improves. Or you may use them over the long term to help keep you alive. Some treatments include the use of: · Medicines to slow the progress of certain diseases, such as heart disease, diabetes, cancer, AIDS, or Alzheimer's disease. · Antibiotics to treat serious infections, such as pneumonia. · Dialysis to clean your blood if your kidneys stop working. · A breathing machine to help you breathe if you can't breathe on your own. This machine pumps air into your lungs through a tube put into your throat. · A feeding tube or an intravenous (IV) line to give you food and fluids if you can't eat or drink. · Cardiopulmonary resuscitation (CPR) to try to restart your heart. The decision to receive treatments that may help you live longer is a personal one. You may want your doctor to do everything possible to keep you alive, even when your chance for recovery is small. Or you may choose to only have care to manage your pain and other symptoms. What are key points about this decision? · If there is a good chance that your illness can be cured or managed, your doctor may advise you to first try available treatments. If these don't work, then you might think about stopping treatment. · If you stop treatment, you will still receive care that focuses on pain relief and comfort. · A decision to stop treatment that keeps you alive does not have to be permanent. You can always change your mind if your health starts to improve. · Even though treatment focuses on helping you live longer, it may cause side effects that can greatly affect your quality of life. And it could affect how you spend time with your family and friends. · If you still have personal goals that you want to pursue, you may want treatment that keeps you alive long enough to reach them. Why might you choose life-prolonging treatment? · There is a good chance that your illness can be cured or managed. · You think you can manage the possible side effects of treatment. · You don't think treatment will get in the way of your quality of life. · You have personal goals that you still want to pursue and achieve. Why might you choose to stop life-prolonging treatment? · Your chance of surviving your illness is very low. · You have tried all possible treatments for your illness, but they have not helped. · You can no longer deal with the side effects of treatment. · You have already met the goals you set out to achieve in your life. Your decision Thinking about the facts and your feelings can help you make a decision that is right for you. Be sure you understand the benefits and risks of your options, and think about what else you need to do before you make the decision. Where can you learn more? Go to http://jimena-catarino.info/ Enter U623 in the search box to learn more about \"Deciding About Life-Prolonging Treatment. \" Current as of: December 9, 2019               Content Version: 12.5 © 4350-2664 [employer]. Care instructions adapted under license by [employer] (which disclaims liability or warranty for this information). If you have questions about a medical condition or this instruction, always ask your healthcare professional. [address] any warranty or liability for your use of this information. Raegan [address] What is a living will? A living will, also called a declaration, is a legal form. It tells your family and your doctor your wishes when you can't speak for yourself. It's used by the health professionals who will treat you as you near the end of your life or if you get seriously hurt or ill. If you put your wishes in writing, your loved ones and others will know what kind of care you want. They won't need to guess. This can ease your mind and be helpful to others. And you can change or cancel your living will at any time. A living will is not the same as an estate or property will. An estate will explains what you want to happen with your money and property after you die. How do you use it? A living will is used to describe the kinds of treatment or life support you want as you near the end of your life or if you get seriously hurt or ill. Keep these facts in mind about living edmond. · Your living will is used only if you can't speak or make decisions for yourself. Most often, one or more doctors must certify that you can't speak or decide for yourself before your living will takes effect. · If you get better and can speak for yourself again, you can accept or refuse any treatment. It doesn't matter what you said in your living will. · Some states may limit your right to refuse treatment in certain cases. For example, you may need to clearly state in your living will that you don't want artificial hydration and nutrition, such as being fed through a tube. Is a living will a legal document? A living will is a legal document. Each state has its own laws about living edmond. And a living will may be called something else in your state. Here are some things to know about living edmond. · You don't need an  to complete a living will. But legal advice can be helpful if your state's laws are unclear. It can also help if your health history is complicated or your family can't agree on what should be in your living will. · You can change your living will at any time. Some people find that their wishes about end-of-life care change as their health changes. If you make big changes to your living will, complete a new form. · If you move to another state, make sure that your living will is legal in the state where you now live. In most cases, doctors will respect your wishes even if you have a form from a different state. · You might use a universal form that has been approved by many states. This kind of form can sometimes be filled out and stored online. Your digital copy will then be available wherever you have a connection to the internet. The doctors and nurses who need to treat you can find it right away. · Your state may offer an online registry. This is another place where you can store your living will online. · It's a good idea to get your living will notarized.  This means using a person called a  to watch two people sign, or witness, your living will. What should you know when you create a living will? Here are some questions to ask yourself as you make your living will: · Do you know enough about life support methods that might be used? If not, talk to your doctor so you know what might be done if you can't breathe on your own, your heart stops, or you can't swallow. · What things would you still want to be able to do after you receive life-support methods? Would you want to be able to walk? To speak? To eat on your own? To live without the help of machines? · Do you want certain Uatsdin practices performed if you become very ill? · If you have a choice, where do you want to be cared for? In your home? At a hospital or nursing home? · If you have a choice at the end of your life, where would you prefer to die? At home? In a hospital or nursing home? Somewhere else? · Would you prefer to be buried or cremated? · Do you want your organs to be donated after you die? What should you do with your living will? · Make sure that your family members and your health care agent have copies of your living will (also called a declaration). · Give your doctor a copy of your living will. Ask him or her to keep it as part of your medical record. If you have more than one doctor, make sure that each one has a copy. · Put a copy of your living will where it can be easily found. For example, some people may put a copy on their refrigerator door. If you are using a digital copy, be sure your doctor, family members, and health care agent know how to find and access it. Where can you learn more? Go to http://www.gray.com/ Enter I728 in the search box to learn more about \"Learning About [facility]. \" Current as of: December 9, 2019               Content Version: 12.5 © 5023-5886 Healthwise, Incorporated. Care instructions adapted under license by [employer] (which disclaims liability or warranty for this information). If you have questions about a medical condition or this instruction, always ask your healthcare professional. [address] any warranty or liability for your use of this information. Learning About [address] What is a medical power of ? A medical power of , also called a durable power of  for health care, is one type of the legal forms called advance directives. It lets you name the person you want to make treatment decisions for you if you can't speak or decide for yourself. The person you choose is called your health care agent. This person is also called a health care proxy or health care surrogate. A medical power of  may be called something else in your state. How do you choose a health care agent? Choose your health care agent carefully. This person may or may not be a family member. Talk to the person before you make your final decision. Make sure he or she is comfortable with this responsibility. It's a good idea to choose someone who: · Is at least 25years old. · Knows you well and understands what makes life meaningful for you. · Understands your [employer] and moral values. · Will do what you want, not what he or she wants. · Will be able to make difficult choices at a stressful time. · Will be able to refuse or stop treatment, if that is what you would want, even if you could die. · Will be firm and confident with health professionals if needed. · Will ask questions to get needed information. · Lives near you or agrees to travel to you if needed. Your family may help you make medical decisions while you can still be part of that process. But it's important to choose one person to be your health care agent in case you aren't able to make decisions for yourself. If you don't fill out the legal form and name a health care agent, the decisions your family can make may be limited. A health care agent may be called something else in your state. Who will make decisions for you if you don't have a health care agent? If you don't have a health care agent or a living will, you may not get the care you want. Decisions may be made by family members who disagree about your medical care. Or decisions may be made by a medical professional who doesn't know you well. In some cases, a  makes the decisions. When you name a health care agent, it is very clear who has the power to make health decisions for you. How do you name a health care agent? You name your health care agent on a legal form. This form is usually called a medical power of . Ask your hospital, state bar association, or office on aging where to find these forms. You must sign the form to make it legal. Some states require you to get the form notarized. This means that a person called a  watches you sign the form and then he or she signs the form. Some states also require that two or more witnesses sign the form. Be sure to tell your family members and doctors who your health care agent is. Where can you learn more? Go to http://www.gray.com/ Enter [id number] in the search box to learn more about \"Learning About [address]. \" Current as of: December 9, 2019               Content Version: 12.5 © 7038-4896 Healthwise, Incorporated. Care instructions adapted under license by [employer] (which disclaims liability or warranty for this information). If you have questions about a medical condition or this instruction, always ask your healthcare professional. [address] any warranty or liability for your use of this information. Deciding About Stopping Dialysis How can you decide about stopping dialysis? What should you know about stopping dialysis? Dialysis is a process that filters waste from your blood when your kidneys can no longer do the job. When you have kidney failure, you may have either hemodialysis or peritoneal dialysis. Most people die within weeks of stopping dialysis. If you decide to stop, health professionals can help you have the highest quality of life possible. These are people who give end-of-life care. This may be done through hospice care. Hospice care offers the chance to think about personal goals. It can relieve pain. And it can help take care of your emotional and spiritual needs. No matter what you decide, take the time to let others know your wishes about your care. You can use a legal document to make sure that you get the medical treatment you want. This is called an advance directive. What are bedoya points about this decision? · You may feel better on dialysis than you did before you started treatment. But you may have side effects, such as appetite changes. Or you may start to have other problems. If this is the case, you may feel that continuing treatment is too hard. · If dialysis lets you do the activities you enjoyed before, you may feel that it hasn't changed your daily life that much. You may feel this way even if you can't do all of your old activities. Or you may feel that your quality of life on dialysis is not good. · Your diagnosis of kidney failure may force you to rethink your goals for your future. If you feel that your life has been rewarding and that you have met many goals, you may feel okay about stopping dialysis. But if you have goals you have not yet met, you may want to continue. · Most people die within weeks of stopping dialysis. If you choose to stop, you should be ready to put your personal, financial, and legal affairs in order. You may want to continue dialysis if you aren't ready to face these issues. · Clearly state your wishes to your family. If you decide to stop treatment, will your family understand your reasons? Do they support your decision to continue (or stop) treatment? Why might you choose to stop dialysis? If you have been getting regular dialysis, and if a kidney transplant is not an option for you, stopping dialysis may: · Give you more time each day to spend with friends and family instead of going to treatments. · Allow you to eat and drink what you want in the time you have left. You may welcome this if your diet has been limited while you have been on dialysis. · Relieve worries about paying for dialysis, if you have been concerned. · Reduce problems that come with regular dialysis. These problems may include infection or clotting of the access. · Encourage you to talk with your loved ones about end-of-life goals and wishes. Why might you choose to stay on dialysis? Staying on dialysis may: · Allow you to live longer and have more time with your family and friends. · Give you time to complete your goals and projects. · Help you feel better for as long as possible. You may feel better physically on dialysis than you did before dialysis. · Allow you to do your normal activities. · Give you more time to put your affairs in order. Your decision Thinking about the facts and your feelings can help you make a decision that is right for you. Be sure you understand the benefits and risks of your options, and think about what else you need to do before you make the decision. Where can you learn more? Go to http://www.gray.com/ Enter J877 in the search box to learn more about \"Deciding About Stopping Dialysis. \" Current as of: August 12, 2019               Content Version: 12.5 © 8964-0884 Healthwise, Incorporated. Care instructions adapted under license by [employer] (which disclaims liability or warranty for this information).  If you have questions about a medical condition or this instruction, always ask your healthcare professional. [address] any warranty or liability for your use of this information. Recovering From Depression: Care Instructions Your Care Instructions Taking good care of yourself is important as you recover from depression. In time, your symptoms will fade as your treatment takes hold. Do not give up. Instead, focus your energy on getting better. Your mood will improve. It just takes some time. Focus on things that can help you feel better, such as being with friends and family, eating well, and getting enough rest. But take things slowly. Do not do too much too soon. You will begin to feel better gradually. Follow-up care is a key part of your treatment and safety. Be sure to make and go to all appointments, and call your doctor if you are having problems. It's also a good idea to know your test results and keep a list of the medicines you take. How can you care for yourself at home? Be realistic · If you have a large task to do, break it up into smaller steps you can handle, and just do what you can. · You may want to put off important decisions until your depression has lifted. If you have plans that will have a major impact on your life, such as marriage, divorce, or a job change, try to wait a bit. Talk it over with friends and loved ones who can help you look at the overall picture first. 
· Reaching out to people for help is important. Do not isolate yourself. Let your family and friends help you. Find someone you can trust and confide in, and talk to that person. · Be patient, and be kind to yourself. Remember that depression is not your fault and is not something you can overcome with willpower alone. Treatment is necessary for depression, just like for any other illness. Feeling better takes time, and your mood will improve little by little. Stay active · Stay busy and get outside. Take a walk, or try some other light exercise. · Talk with your doctor about an exercise program. Exercise can help with mild depression. · Go to a movie or concert. Take part in a Evangel[employer] activity or other social gathering. Go to a ball game. · Ask a friend to have dinner with you. Take care of yourself · Eat a balanced diet with plenty of fresh fruits and vegetables, whole grains, and lean protein. If you have lost your appetite, eat small snacks rather than large meals. · Avoid drinking alcohol or using illegal drugs. Do not take medicines that have not been prescribed for you. They may interfere with medicines you may be taking for depression, or they may make your depression worse. · Take your medicines exactly as they are prescribed. You may start to feel better within 1 to 3 weeks of taking antidepressant medicine. But it can take as many as 6 to 8 weeks to see more improvement. If you have questions or concerns about your medicines, or if you do not notice any improvement by 3 weeks, talk to your doctor. · If you have any side effects from your medicine, tell your doctor. Antidepressants can make you feel tired, dizzy, or nervous. Some people have dry mouth, constipation, headaches, sexual problems, or diarrhea. Many of these side effects are mild and will go away on their own after you have been taking the medicine for a few weeks. Some may last longer. Talk to your doctor if side effects are bothering you too much. You might be able to try a different medicine. · Get enough sleep. If you have problems sleeping: 
? Go to bed at the same time every night, and get up at the same time every morning. ? Keep your bedroom dark and quiet. ? Do not exercise after 5:00 p.m. ? Avoid drinks with caffeine after 5:00 p.m. · Avoid sleeping pills unless they are prescribed by the doctor treating your depression.  Sleeping pills may make you groggy during the day, and they may interact with other medicine you are taking. · If you have any other illnesses, such as diabetes, heart disease, or high blood pressure, make sure to continue with your treatment. Tell your doctor about all of the medicines you take, including those with or without a prescription. · Keep the numbers for these national suicide hotlines: 5-689-299-TALK (0-761.705.9141) and 5-883-QKCSNGV (2-533.806.6644). If you or someone you know talks about suicide or feeling hopeless, get help right away. When should you call for help? KUEV717 anytime you think you may need emergency care. For example, call if: 
· You feel like hurting yourself or someone else. · Someone you know has depression and is about to attempt or is attempting suicide. Call your doctor now or seek immediate medical care if: 
· You hear voices. · Someone you know has depression and: 
? Starts to give away his or her possessions. ? Uses illegal drugs or drinks alcohol heavily. ? Talks or writes about death, including writing suicide notes or talking about guns, knives, or pills. ? Starts to spend a lot of time alone. ? Acts very aggressively or suddenly appears calm. Watch closely for changes in your health, and be sure to contact your doctor if: 
· You do not get better as expected. Where can you learn more? Go to http://jimena-catarino.info/ Enter Y597 in the search box to learn more about \"Recovering From Depression: Care Instructions. \" Current as of: January 31, 2020               Content Version: 12.5 © 0012-7385 Healthwise, Incorporated. Care instructions adapted under license by [employer] (which disclaims liability or warranty for this information). If you have questions about a medical condition or this instruction, always ask your healthcare professional. [address] any warranty or liability for your use of this information.

## 2020-07-29 RX ORDER — ALFUZOSIN HYDROCHLORIDE 10 MG/1
TABLET, EXTENDED RELEASE ORAL
Qty: 90 TAB | Refills: 3 | Status: SHIPPED | OUTPATIENT
Start: 2020-07-29 | End: 2021-07-06

## 2020-08-17 ENCOUNTER — TELEPHONE (OUTPATIENT)
Dept: FAMILY MEDICINE CLINIC | Age: 75
End: 2020-08-17

## 2020-08-17 NOTE — TELEPHONE ENCOUNTER
Have you been diagnosed with, tested for, or told that you are suspected of having COVID-19 (coronovirus)? no  Have you had a fever or taken medication to treat a fever in the past 72 hours? no  Have you had a cough, SOB, or flu-like symptoms within the past 3 days?no  Have you had direct contact with someone who tested positive for COVID-19 within the past 14 days? no  Do you have a household member with flu-like symptoms, including fever, cough, or SOB? no  Do you currently have flu-like symptoms including fever, cough, or SOB?  No

## 2020-08-18 ENCOUNTER — OFFICE VISIT (OUTPATIENT)
Dept: FAMILY MEDICINE CLINIC | Age: 75
End: 2020-08-18

## 2020-08-18 VITALS
RESPIRATION RATE: 16 BRPM | DIASTOLIC BLOOD PRESSURE: 62 MMHG | BODY MASS INDEX: 23.19 KG/M2 | HEART RATE: 73 BPM | WEIGHT: 153 LBS | TEMPERATURE: 98 F | SYSTOLIC BLOOD PRESSURE: 116 MMHG | HEIGHT: 68 IN | OXYGEN SATURATION: 97 %

## 2020-08-18 DIAGNOSIS — E04.9 ENLARGED THYROID GLAND: ICD-10-CM

## 2020-08-18 DIAGNOSIS — R73.01 IMPAIRED FASTING BLOOD SUGAR: ICD-10-CM

## 2020-08-18 DIAGNOSIS — M54.9 DISCOMFORT OF BACK: ICD-10-CM

## 2020-08-18 DIAGNOSIS — M54.2 NECK PAIN: ICD-10-CM

## 2020-08-18 DIAGNOSIS — G47.9 SLEEP TROUBLE: ICD-10-CM

## 2020-08-18 DIAGNOSIS — N40.1 BENIGN PROSTATIC HYPERPLASIA WITH LOWER URINARY TRACT SYMPTOMS, SYMPTOM DETAILS UNSPECIFIED: ICD-10-CM

## 2020-08-18 DIAGNOSIS — I10 ESSENTIAL HYPERTENSION: ICD-10-CM

## 2020-08-18 DIAGNOSIS — R20.0 NUMBNESS: ICD-10-CM

## 2020-08-18 DIAGNOSIS — R45.89 FEELING SAD: Primary | ICD-10-CM

## 2020-08-18 NOTE — PROGRESS NOTES
Chief Complaint   Patient presents with    Other     Pre-DM    Hypertension    Cholesterol Problem    GERD    Sad     1. Have you been to the ER, urgent care clinic since your last visit? Hospitalized since your last visit? No    2. Have you seen or consulted any other health care providers outside of the [address] [place] [address] since your last visit? Include any pap smears or colon screening.  Nicole Trani- Allergist/ENT

## 2020-08-18 NOTE — PROGRESS NOTES
HISTORY OF PRESENT ILLNESS  Rohit Hess is a 69 y.o. male. HPI: Here for follow-up. Last visit was feeling sad. Some social stress. Wife health is deteriorating and that was making him feel sad. Started on SSRI. Felt  Did not tolerate it well. He has stopped it. Shelby Parkinson he is feeling better now. Some sleep trouble. Discussed sleep hygiene. He does not want any medication for insomnia at this time. Tried over-the-counter melatonin but not helping much. Said no unusual fatigue. Discussed the trazodone but he wants to observe at this time without any medication. Prediabetes. He is working on diet modification. Will observe and repeat labs next visit. History of hypertension. On medication. Taking it with compliance. No side effects. Today vitals been stable. Sitting comfortable and did not appear in any acute distress during the visit. Denies any headache, dizziness, no chest pain or trouble breathing, no arm or leg weakness. No nausea or vomiting, no weight or appetite changes, no mood changes . No urine or bowel complains, no palpitation, no diaphoresis. No abdominal pain. No cold or cough. No leg swelling. No fever. No sleep trouble. Complaint of neck pain. Also radiating over the right shoulder. No limitation in range of motion over right shoulder. No extremity weakness. Probably right hand carpal tunnel syndrome. Agreed to get EMG. We will do a referral to neurology. Enlarged thyroid gland palpable. Last ultrasound showed thickening of the isthmus and no  thyroid nodule. Today no abnormal exam.  No unusual fatigue. No weight or appetite change. No trouble swallowing or change in voice    History of BPH. Following urology. No concern at this time. Back discomfort been stable.   Visit Vitals  /62 (BP 1 Location: Left arm, BP Patient Position: Sitting)   Pulse 73   Temp 98 °F (36.7 °C) (Oral)   Resp 16   Ht 5' 8\" (1.727 m)   Wt 153 lb (69.4 kg)   SpO2 97%   BMI 23.26 kg/m²     Review medication list, vitals, problem list,allergies. Lab Results   Component Value Date/Time    WBC 5.9 02/22/2017 03:00 PM    HGB 14.1 02/22/2017 03:00 PM    HCT 42.0 02/22/2017 03:00 PM    PLATELET 188 99/67/1885 03:00 PM    MCV 93.1 02/22/2017 03:00 PM     Lab Results   Component Value Date/Time    Sodium 144 06/03/2020 08:08 AM    Potassium 4.9 06/03/2020 08:08 AM    Chloride 111 06/03/2020 08:08 AM    CO2 30 06/03/2020 08:08 AM    Anion gap 3 06/03/2020 08:08 AM    Glucose 104 (H) 06/03/2020 08:08 AM    BUN 24 (H) 06/03/2020 08:08 AM    Creatinine 0.98 06/03/2020 08:08 AM    BUN/Creatinine ratio 24 (H) 06/03/2020 08:08 AM    GFR est AA >60 06/03/2020 08:08 AM    GFR est non-AA >60 06/03/2020 08:08 AM    Calcium 9.1 06/03/2020 08:08 AM    Bilirubin, total 0.6 06/03/2020 08:08 AM    Alk. phosphatase 86 06/03/2020 08:08 AM    Protein, total 6.7 06/03/2020 08:08 AM    Albumin 4.0 06/03/2020 08:08 AM    Globulin 2.7 06/03/2020 08:08 AM    A-G Ratio 1.5 06/03/2020 08:08 AM    ALT (SGPT) 16 06/03/2020 08:08 AM    AST (SGOT) 12 06/03/2020 08:08 AM     Lab Results   Component Value Date/Time    TSH 1.37 10/04/2018 08:23 AM     Lab Results   Component Value Date/Time    Hemoglobin A1c 5.9 (H) 06/03/2020 08:09 AM         Lab Results   Component Value Date/Time    Microalbumin/Creat ratio (mg/g creat) 5 06/03/2020 08:10 AM    Microalbumin,urine random 0.73 06/03/2020 08:10 AM     Lab Results   Component Value Date/Time    Prostate Specific Ag 1.7 11/18/2019 10:30 AM    Prostate Specific Ag 2.48 06/05/2018 02:53 PM    Prostate Specific Ag 2.62 01/05/2017 01:55 PM    Prostate Specific Ag 1.9 10/17/2013       ROS: see HPI    Physical Exam  Constitutional:       General: He is not in acute distress. Neck:      Musculoskeletal: Neck supple. Cardiovascular:      Rate and Rhythm: Normal rate and regular rhythm. Heart sounds: Normal heart sounds.    Abdominal:      General: Bowel sounds are normal. Palpations: Abdomen is soft. Tenderness: There is no abdominal tenderness. Musculoskeletal:         General: No swelling. Comments: No point tenderness over cervical spine or any paraspinal muscle discomfort. Generalized discomfort over the right trapezius muscle area. Range of motion over the neck discomfort with turning it over the left side. Able to touch chin to the chest.  Upper extremity strength bilaterally equal   Lymphadenopathy:      Cervical: No cervical adenopathy. Neurological:      Mental Status: He is oriented to person, place, and time. Psychiatric:         Behavior: Behavior normal.         ASSESSMENT and PLAN    ICD-10-CM ICD-9-CM    1. Feeling sad: More social stress as wife felt his deteriorating. Last visit given SSRI but could not tolerate it. Currently said feeling better. Does not want any medication or counseling. R45.89 300.4    2. Sleep trouble: Discussed the sleep hygiene. Could be from usual stress. Discussed the trazodone but he does not want to try any medication at this time. OTC melatonin as needed G47.9 780.50    3. Impaired fasting blood sugar: A1c in prediabetic range. For now diet modification and exercise as tolerated R73.01 790.21    4. Essential hypertension: Vitals been stable. Continue current dose of medication. Low-salt diet I10 401.9    5. Benign prostatic hyperplasia with lower urinary tract symptoms, symptom details unspecified: Seen urology. Symptomatic treatment. No concern at this time N40.1 600.01    6. Numbness: Mainly over the right hand. Getting worse. That is mainly disturbing the sleep. For now sending to neurology for EMG. If needed will send to the hand surgeon. Currently continue hand brace and ice application. As needed OTC Tylenol or Motrin R20.0 782.0 REFERRAL TO NEUROLOGY    rt hand, carpal tunnel    7. Enlarged thyroid gland: Ultrasound showed no nodules. Will observe.   TSH within normal limit E04.9 240.9 TSH 3RD GENERATION    ultrasound back in 2018 showed thickening of isthmus , no nodule. 8. Discomfort of back: Currently stable. Symptomatic treatment with a heating pad M54.9 724.5    9. Neck pain: Obtaining x-ray. Sending to physical therapy. Continue symptomatic treatment meantime M54.2 723.1 XR SPINE CERV PA LAT ODONT 3 V MAX      REFERRAL TO PHYSICAL THERAPY   Patient understood and agree with above plan  Review health maintenance  Will obtain the podiatry records  He will make an appointment with the eye exam  He was advised to get the shingles shot from the pharmacy and flu shot will be available soon  Follow-up and Dispositions    · Return in about 2 months (around 10/18/2020).

## 2020-08-18 NOTE — PATIENT INSTRUCTIONS
Neck Pain: Care Instructions  Your Care Instructions     You can have neck pain anywhere from the bottom of your head to the top of your shoulders. It can spread to the upper back or arms. Injuries, painting a ceiling, sleeping with your neck twisted, staying in one position for too long, and many other activities can cause neck pain. Most neck pain gets better with home care. Your doctor may recommend medicine to relieve pain or relax your muscles. He or she may suggest exercise and physical therapy to increase flexibility and relieve stress. You may need to wear a special (cervical) collar to support your neck for a day or two. Follow-up care is a key part of your treatment and safety. Be sure to make and go to all appointments, and call your doctor if you are having problems. It's also a good idea to know your test results and keep a list of the medicines you take. How can you care for yourself at home? · Try using a heating pad on a low or medium setting for 15 to 20 minutes every 2 or 3 hours. Try a warm shower in place of one session with the heating pad. · You can also try an ice pack for 10 to 15 minutes every 2 to 3 hours. Put a thin cloth between the ice and your skin. · Take pain medicines exactly as directed. ? If the doctor gave you a prescription medicine for pain, take it as prescribed. ? If you are not taking a prescription pain medicine, ask your doctor if you can take an over-the-counter medicine. · If your doctor recommends a cervical collar, wear it exactly as directed. When should you call for help? Call your doctor now or seek immediate medical care if:  · You have new or worsening numbness in your arms, buttocks or legs. · You have new or worsening weakness in your arms or legs. (This could make it hard to stand up.)  · You lose control of your bladder or bowels.   Watch closely for changes in your health, and be sure to contact your doctor if:  · Your neck pain is getting worse.  · You are not getting better after 1 week. · You do not get better as expected. Where can you learn more? Go to http://ijmena-catarino.info/  Enter V723 in the search box to learn more about \"Neck Pain: Care Instructions. \"  Current as of: March 2, 2020               Content Version: 12.5  © 2006-2020 [employer]. Care instructions adapted under license by [employer] (which disclaims liability or warranty for this information). If you have questions about a medical condition or this instruction, always ask your healthcare professional. [place] [address] any warranty or liability for your use of this information. Neck: Exercises  Introduction  Here are some examples of exercises for you to try. The exercises may be suggested for a condition or for rehabilitation. Start each exercise slowly. Ease off the exercises if you start to have pain. You will be told when to start these exercises and which ones will work best for you. How to do the exercises  Neck stretch   1. This stretch works best if you keep your shoulder down as you lean away from it. To help you remember to do this, start by relaxing your shoulders and lightly holding on to your thighs or your chair. 2. Tilt your head toward your shoulder and hold for 15 to 30 seconds. Let the weight of your head stretch your muscles. 3. If you would like a little added stretch, use your hand to gently and steadily pull your head toward your shoulder. For example, keeping your right shoulder down, lean your head to the left. 4. Repeat 2 to 4 times toward each shoulder. Diagonal neck stretch   1. Turn your head slightly toward the direction you will be stretching, and tilt your head diagonally toward your chest and hold for 15 to 30 seconds.   2. If you would like a little added stretch, use your hand to gently and steadily pull your head forward on the diagonal.  3. Repeat 2 to 4 times toward each side. Dorsal glide stretch   The dorsal glide stretches the back of the neck. If you feel pain, do not glide so far back. Some people find this exercise easier to do while lying on their backs with an ice pack on the neck. 1. Sit or stand tall and look straight ahead. 2. Slowly tuck your chin as you glide your head backward over your body  3. Hold for a count of 6, and then relax for up to 10 seconds. 4. Repeat 8 to 12 times. Chest and shoulder stretch   1. Sit or stand tall and glide your head backward as in the dorsal glide stretch. 2. Raise both arms so that your hands are next to your ears. 3. Take a deep breath, and as you breathe out, lower your elbows down and behind your back. You will feel your shoulder blades slide down and together, and at the same time you will feel a stretch across your chest and the front of your shoulders. 4. Hold for about 6 seconds, and then relax for up to 10 seconds. 5. Repeat 8 to 12 times. Strengthening: Hands on head   1. Move your head backward, forward, and side to side against gentle pressure from your hands, holding each position for about 6 seconds. 2. Repeat 8 to 12 times. Follow-up care is a key part of your treatment and safety. Be sure to make and go to all appointments, and call your doctor if you are having problems. It's also a good idea to know your test results and keep a list of the medicines you take. Where can you learn more? Go to http://www.[employer].com/  Enter P975 in the search box to learn more about \"Neck: Exercises. \"  Current as of: March 2, 2020               Content Version: 12.5  © 9142-3052 Healthwise, Incorporated. Care instructions adapted under license by [employer] (which disclaims liability or warranty for this information).  If you have questions about a medical condition or this instruction, always ask your healthcare professional. Rosalina Vang any warranty or liability for your use of this information. Carpal Tunnel Syndrome: Exercises  Introduction  Here are some examples of exercises for you to try. The exercises may be suggested for a condition or for rehabilitation. Start each exercise slowly. Ease off the exercises if you start to have pain. You will be told when to start these exercises and which ones will work best for you. Warm-up stretches  When you no longer have pain or numbness, you can do exercises to help prevent carpal tunnel syndrome from coming back. Do not do any stretch or movement that is uncomfortable or painful. 1. Rotate your wrist up, down, and from side to side. Repeat 4 times. 2. Stretch your fingers far apart. Relax them, and then stretch them again. Repeat 4 times. 3. Stretch your thumb by pulling it back gently, holding it, and then releasing it. Repeat 4 times. How to do the exercises  Prayer stretch   1. Start with your palms together in front of your chest just below your chin. 2. Slowly lower your hands toward your waistline, keeping your hands close to your stomach and your palms together until you feel a mild to moderate stretch under your forearms. 3. Hold for at least 15 to 30 seconds. Repeat 2 to 4 times. Wrist flexor stretch   1. Extend your arm in front of you with your palm up. 2. Bend your wrist, pointing your hand toward the floor. 3. With your other hand, gently bend your wrist farther until you feel a mild to moderate stretch in your forearm. 4. Hold for at least 15 to 30 seconds. Repeat 2 to 4 times. Wrist extensor stretch   1. Repeat steps 1 through 4 of the stretch above, but begin with your extended hand palm down. Follow-up care is a key part of your treatment and safety. Be sure to make and go to all appointments, and call your doctor if you are having problems. It's also a good idea to know your test results and keep a list of the medicines you take. Where can you learn more?   Go to http://jimena-catarino.info/  Enter P028 in the search box to learn more about \"Carpal Tunnel Syndrome: Exercises. \"  Current as of: March 2, 2020               Content Version: 12.5  © 2006-2020 Healthwise, Incorporated. Care instructions adapted under license by [employer] (which disclaims liability or warranty for this information). If you have questions about a medical condition or this instruction, always ask your healthcare professional. [place] [address] any warranty or liability for your use of this information. Learning About Sleeping Well  What does sleeping well mean? Sleeping well means getting enough sleep. How much sleep is enough varies among people. The number of hours you sleep is not as important as how you feel when you wake up. If you do not feel refreshed, you probably need more sleep. Another sign of not getting enough sleep is feeling tired during the day. The average total nightly sleep time is 7½ to 8 hours. Healthy adults may need a little more or a little less than this. Why is getting enough sleep important? Getting enough quality sleep is a basic part of good health. When your sleep suffers, your mood and your thoughts can suffer too. You may find yourself feeling more grumpy or stressed. Not getting enough sleep also can lead to serious problems, including injury, accidents, anxiety, and depression. What might cause poor sleeping? Many things can cause sleep problems, including:  · Stress. Stress can be caused by fear about a single event, such as giving a speech. Or you may have ongoing stress, such as worry about work or school. · Depression, anxiety, and other mental or emotional conditions. · Changes in your sleep habits or surroundings. This includes changes that happen where you sleep, such as noise, light, or sleeping in a different bed.  It also includes changes in your sleep pattern, such as having jet lag or working a late shift. · Health problems, such as pain, breathing problems, and restless legs syndrome. · Lack of regular exercise. How can you help yourself? Here are some tips that may help you sleep more soundly and wake up feeling more refreshed. Your sleeping area   · Use your bedroom only for sleeping and sex. A bit of light reading may help you fall asleep. But if it doesn't, do your reading elsewhere in the house. Don't watch TV in bed. · Be sure your bed is big enough to stretch out comfortably, especially if you have a sleep partner. · Keep your bedroom quiet, dark, and cool. Use curtains, blinds, or a sleep mask to block out light. To block out noise, use earplugs, soothing music, or a \"white noise\" machine. Your evening and bedtime routine   · Create a relaxing bedtime routine. You might want to take a warm shower or bath, listen to soothing music, or drink a cup of noncaffeinated tea. · Go to bed at the same time every night. And get up at the same time every morning, even if you feel tired. What to avoid   · Limit caffeine (coffee, tea, caffeinated sodas) during the day, and don't have any for at least 4 to 6 hours before bedtime. · Don't drink alcohol before bedtime. Alcohol can cause you to wake up more often during the night. · Don't smoke or use tobacco, especially in the evening. Nicotine can keep you awake. · Don't take naps during the day, especially close to bedtime. · Don't lie in bed awake for too long. If you can't fall asleep, or if you wake up in the middle of the night and can't get back to sleep within 15 minutes or so, get out of bed and go to another room until you feel sleepy. · Don't take medicine right before bed that may keep you awake or make you feel hyper or energized. Your doctor can tell you if your medicine may do this and if you can take it earlier in the day. If you can't sleep   · Imagine yourself in a peaceful, pleasant scene.  Focus on the details and feelings of being in a place that is relaxing. · Get up and do a quiet or boring activity until you feel sleepy. · Don't drink any liquids after 6 p.m. if you wake up often because you have to go to the bathroom. Where can you learn more? Go to http://www.gray.com/  Enter C997 in the search box to learn more about \"Learning About Sleeping Well. \"  Current as of: January 31, 2020               Content Version: 12.5  © 2006-2020 Healthwise, Incorporated. Care instructions adapted under license by [employer] (which disclaims liability or warranty for this information). If you have questions about a medical condition or this instruction, always ask your healthcare professional. [address] any warranty or liability for your use of this information.

## 2020-08-24 ENCOUNTER — HOSPITAL ENCOUNTER (OUTPATIENT)
Dept: LAB | Age: 75
Discharge: HOME | End: 2020-08-24
Payer: MEDICARE

## 2020-08-24 ENCOUNTER — HOSPITAL ENCOUNTER (OUTPATIENT)
Dept: GENERAL RADIOLOGY | Age: 75
Discharge: HOME | End: 2020-08-24
Payer: MEDICARE

## 2020-08-24 DIAGNOSIS — M54.2 NECK PAIN: ICD-10-CM

## 2020-08-24 DIAGNOSIS — E04.9 ENLARGED THYROID GLAND: ICD-10-CM

## 2020-08-24 LAB — TSH SERPL DL<=0.05 MIU/L-ACNC: 1.01 UIU/ML (ref 0.36–3.74)

## 2020-08-24 PROCEDURE — 72040 X-RAY EXAM NECK SPINE 2-3 VW: CPT

## 2020-08-24 PROCEDURE — 84443 ASSAY THYROID STIM HORMONE: CPT

## 2020-08-24 PROCEDURE — 36415 COLL VENOUS BLD VENIPUNCTURE: CPT

## 2020-08-26 ENCOUNTER — HOSPITAL ENCOUNTER (OUTPATIENT)
Dept: PHYSICAL THERAPY | Age: 75
Discharge: HOME | End: 2020-08-26
Payer: MEDICARE

## 2020-08-26 PROCEDURE — 97110 THERAPEUTIC EXERCISES: CPT

## 2020-08-26 PROCEDURE — 97140 MANUAL THERAPY 1/> REGIONS: CPT

## 2020-08-26 PROCEDURE — 97161 PT EVAL LOW COMPLEX 20 MIN: CPT

## 2020-08-26 NOTE — PROGRESS NOTES
[facility]  [address] [facility], [address] [place] [address], [facility] [address]  [phone] fax      Plan of Care/ Statement of Necessity for Physical Therapy Services    Patient name: Lawrence Shane Start of Care: 2020   Referral source: Dominik Lin MD : 1945    Medical Diagnosis: Cervicalgia [M54.2]  Payor: Isaura Grace / Plan: [place] MED[place] A & B / Product Type: Medicare /  Onset Date:8 months    Treatment Diagnosis: neck pain   Prior Hospitalization: see medical history Provider#: [id number]   Medications: Verified on Patient summary List    Comorbidities: depression, arthritis, HTN, hearing impaired, Cancer 1984 basal cell carcinoma   Prior Level of Function:  able to tolerate all areas of ADLS and activities, exercised at the gym, retired, drives, household and community activities tolerated, 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 The Plan of Care and following information is based on the information from the initial evaluation. Assessment/ key information: 71 YO male diagnosed as above and with S/S consistent with above diagnosis presents to skilled outpatient PT. CCO neck pain with additional reports of B distal UE tingling and pain right UE at night time. Right hand dominant, intermittent sharp pains in the right hand. patient with postural deviations, decrease ROM, intermittent B UE S/S right > left, STC with trigger point right UT. Patient demonstrates the potential to make gains with improved ROM, strength, endurance/activity tolerance, functional FOTO survey score   and all within a reasonable time frame so as to increase their functional independence with ADLs and activities for carryover to  Improved quality of life tolerance to household and community activities and volunteer . Patient requires skilled Physical Therapy so as to monitor their response to and modify their treatment plan accordingly.  Patient appears to be an appropriate candidate for skilled outpatient Physical Therapy.     Evaluation Complexity History MEDIUM  Complexity : 1-2 comorbidities / personal factors will impact the outcome/ POC ; Examination MEDIUM Complexity : 3 Standardized tests and measures addressing body structure, function, activity limitation and / or participation in recreation  ;Presentation LOW Complexity : Stable, uncomplicated  ;Clinical Decision Making MEDIUM Complexity : FOTO score of 26-74  Overall Complexity Rating: LOW   Problem List: pain affecting function, decrease ROM, decrease ADL/ functional abilitiies, decrease activity tolerance, decrease flexibility/ joint mobility and other FOTO 74   Treatment Plan may include any combination of the following: Therapeutic exercise, Therapeutic activities, Neuromuscular re-education, Physical agent/modality, Manual therapy, Patient education, Self Care training and Home safety training  Patient / Family readiness to learn indicated by: asking questions, trying to perform skills and interest  Persons(s) to be included in education: patient (P)  Barriers to Learning/Limitations: yes;  sensory deficits-vision/hearing/speech  Patient Goal (s): comfort  Patient Self Reported Health Status: good  Rehabilitation Potential: good    Short Term Goals: To be accomplished in 5 treatments:   1 patient will have established and be I with HEP to aid with self management at discharge   EVAL issued   CURRENT   2 patient will tolerate UBE 5 minutes retro and have no increase UE S/S for carryover to increased awareness to posture   EVAL not initiated   CURRENT      Long Term Goals:  To be accomplished in 10 treatments:   1 patient will have pain 1/10 to aid with increase tolerance to sleep   EVAL 2   CURRENT   2 patient will report overall 50% improvement to aid with increase tolerance to sleep and his activities volunteering   EVAL neck pain and B UE intermittent tingling    CURRENT   3 patient will have B ROT 45 and SB B 20 to aid with increase tolerance to ADLS and activities   EVAL ROT B 38 and SB right 10, left 17   CURRENT  Frequency / Duration: Patient to be seen 2-3 times per week for 10 treatments. Patient/ Caregiver education and instruction: Diagnosis, prognosis, self care, activity modification and exercises   [x]  Plan of care has been reviewed with PTA    Certification Period: 8/26/20-9/24/20  Heladio Pruett, PT 8/26/2020 11:31 AM    ________________________________________________________________________    I certify that the above Therapy Services are being furnished while the patient is under my care. I agree with the treatment plan and certify that this therapy is necessary.     Physician's Signature:____________Date:_________TIME:________    [employer], Date and Time must be completed for valid certification **    Please sign and return to In . Timi Ksawerego [address] [place] [address], [address] [place] [address], [address] [place] [address],[facility] 300 [phone] fax

## 2020-08-26 NOTE — PROGRESS NOTES
PT DAILY TREATMENT NOTE/CERVICAL [id number]    Patient Name: Kenny Yao  Date:2020  : 1945  [x]  Patient  Verified  Payor: [place] MEDICARE / Plan: [place] MEDICARE PART A & B / Product Type: Medicare /    In time:1045  Out time:1120  Total Treatment Time (min): 35  Visit #: 1 of 10    Medicare/BCBS Only   Total Timed Codes (min):  23 1:1 Treatment Time:  23     Treatment Area: Cervicalgia [M54.2]    SUBJECTIVE  Pain Level (0-10 scale): 2  [x]constant []intermittent []improving []worsening [x]no change since onset  WORSE with activity , mornings, end of day,   BETTER lying in bed, Right SL with pillow for support  Any medication changes, allergies to medications, adverse drug reactions, diagnosis change, or new procedure performed?: [x] No    [] Yes (see summary sheet for update)  Subjective functional status/changes:     PLOF: able to tolerate all areas of ADLS and activities, exercised at the gym, retired, drives, household and community activities tolerated, 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 Limitations to PLOF: Pain  Mechanism of Injury:8 months ago, worsened about 4-5 months ago   Current symptoms/Complaints: 69 YO male diagnosed as above and with S/S consistent with above diagnosis presents to skilled outpatient PT. CCO neck pain with additional reports of B distal UE tingling and pain right UE at night time. Right hand dominant, intermittent sharp pains in the right hand.    Previous Treatment/Compliance: md, xrays, neurology scheduled  PMHx/Surgical Hx: depression, arthritis, HTN, hearing impaired, Cancer 1984 basal cell carcinoma  Work Hx: retired  Living Situation: lives in a house , not alone  Pt Goals: comfort  Barriers: []pain []financial []time []transportation []other  Motivation: good  Substance use: []Alcohol []Tobacco []other:   FABQ Score: []low []elevate  Cognition: A & O x 4    Other:    OBJECTIVE/EXAMINATION  Domestic Life: volunteer work, household and community activities  Activity/Recreational Limitations: pain  Mobility: I  Self Care: I         12 min [x]Eval                  []Re-Eval       13 min Therapeutic Exercise:  [x] See flow sheet :   Rationale: increase ROM, increase strength and improve coordination to improve the patients ability to tolerate ADLS and activities  10 min Manual Therapy:  DTM TPR STM Right UT   Rationale: decrease pain, increase ROM, increase tissue extensibility and decrease trigger points to tolerate ADLS and activities                With   [x] TE   [x] TA   [] neuro   [] other: Patient Education: [x] Review HEP    [] Progressed/Changed HEP based on:   [x] positioning   [] body mechanics   [] transfers   [x] heat/ice application    [x] other: POC, anatomy        Physical Therapy Evaluation Cervical Spine     SUBJECTIVE  Chief Complaint:as above    Mechanism of injury:insideous onset    Symptoms  Aggravated by:   [] Bending [] Sitting [] Standing [] Reaching Overhead   [] Moving [] Cough [] Sneeze [] Eating   [x] AM  [x] PM  Lying:  [x] sup   [] pro   [] sidelying   [x] Other:writing   Eased by:    [] Bending [] Sitting [] Standing Lying: [] sup  [] pro  [x] sidelying   [] Moving [] AM  [] PM  [] Other:        Past History/Treatments:as above    Diagnostic Tests: [] Lab work [x] X-rays    [] CT [] MRI     [] Other:  Results:    Functional Status  Prior level of function: as above  Present functional limitations:FOTO  What position do you sleep in?:Right SL    Headaches: Do you have headaches? [] Yes   [x] No  How often do you get headaches? How long does the headache last?  What aggravates it? What relieves it? Does the headache coincide with any other symptoms (visual disturbances, light sensitivity)? Where is the headache? Does it change locations?   Other:    OBJECTIVE  Posture: [] WNL  Head Position:mild FH, RS  Shoulder/Scapular Position:  C-Kyphosis:  [] increased   [] decreased   C-Lordosis:   [] increased   [] decreased  T-Kyphosis:  [x] increased   [] decreased  T-Lordosis:   [] increased   [] decreased     TMJ: [x] N/A [] Abnormal - ROM:   Palpation:    Cervical Retraction: [] WNL    [x] Abnormal:decreased ease  Shoulder/Scapular Screen: [x] WNL    [] Abnormal:    Active Movements: [] N/A   [] Too acute   [] Other:  ROM AROM  degrees % PROM Comments:pain, area   Forward flexion 38     Extension 25     SB right 10     SB left  17     Rotation right 38     Rotation left 38       Thoracic Spine: [x] N/A    [] WNL   [] Other:    PROM:    Palpation:  [] Min  [x] Mod  [] Severe    Location:trigger point  Right UT  [] Min  [x] Mod  [] Severe    Location:STC TTP right > Left UT  [] Min  [] Mod  [] Severe    Location:    Neuro Screen (myotome/dematome/felexes): [] WNL  Myotome Level Muscle Test Myotome Level Muscle Test   C5 Shoulder Adduction - Deltoid C8 Finger Flexors   C6 Wrist Extension T1 Finger Abduction - Interossei   C7 Elbow Extension     Comments:  Upper Limb Tension Tests: [] N/A       Ulnar: [x] R    [x] L    [x] +    [] -       Median: [] R    [] L    [] +    [] -       Radial: [] R    [] L    [] +    [] -       Muscle Flexibility: [] N/A   Scalenes: [] WNL    [x] Tight    [x] R    [x] L   Upper Trap: [] WNL    [x] Tight    [x] R    [x] L   Levator: [] WNL    [x] Tight    [x] R    [x] L   Pect. Minor: [] WNL    [x] Tight    [x] R    [x] L       Pain Level (0-10 scale) post treatment: 2    ASSESSMENT/Changes in Function: Patient demonstrates the potential to make gains with improved ROM, strength, endurance/activity tolerance, functional FOTO survey score   and all within a reasonable time frame so as to increase their functional independence with ADLs and activities for carryover to  Improved quality of life tolerance to household and community activities and volunteer . Patient requires skilled Physical Therapy so as to monitor their response to and modify their treatment plan accordingly.  Patient appears to be an appropriate candidate for skilled outpatient Physical Therapy. Patient will continue to benefit from skilled PT services to modify and progress therapeutic interventions, address ROM deficits, address strength deficits, analyze and address soft tissue restrictions, analyze and cue movement patterns, analyze and modify body mechanics/ergonomics, assess and modify postural abnormalities and instruct in home and community integration to attain remaining goals.      [x]  See Plan of Care  []  See progress note/recertification  []  See Discharge Summary         Progress towards goals / Updated goals:        PLAN  [x]  Upgrade activities as tolerated     [x]  Continue plan of care  []  Update interventions per flow sheet       []  Discharge due to:_  []  Other:_      Demarco Bhatti, PT 8/26/2020  10:47 AM

## 2020-08-28 ENCOUNTER — HOSPITAL ENCOUNTER (OUTPATIENT)
Dept: PHYSICAL THERAPY | Age: 75
Discharge: HOME | End: 2020-08-28
Payer: MEDICARE

## 2020-08-28 PROCEDURE — 97110 THERAPEUTIC EXERCISES: CPT

## 2020-08-28 PROCEDURE — 97140 MANUAL THERAPY 1/> REGIONS: CPT

## 2020-08-28 NOTE — PROGRESS NOTES
PT DAILY TREATMENT NOTE 10-18    Patient Name: Carloz Divide  Date:2020  : 1945  [x]  Patient  Verified  Payor: [place] MEDICARE / Plan: [place] MEDICARE PART A & B / Product Type: Medicare /    In time:9:00  Out time:10:01  Total Treatment Time (min): 52  Visit #: 2 of 10    Medicare/BCBS Only   Total Timed Codes (min):  39 1:1 Treatment Time:  39       Treatment Area: Cervicalgia [M54.2]    SUBJECTIVE  Pain Level (0-10 scale): 2-3  Any medication changes, allergies to medications, adverse drug reactions, diagnosis change, or new procedure performed?: [x] No    [] Yes (see summary sheet for update)  Subjective functional status/changes:   [] No changes reported  \"I will be trying to do my exercises 2x day. \"    OBJECTIVE    Modality rationale: decrease edema, decrease inflammation, decrease pain and increase tissue extensibility to improve the patients ability to perform ADL    Min Type Additional Details    [] Estim:  []Unatt       []IFC  []Premod                        []Other:  []w/ice   []w/heat  Position:  Location:    [] Estim: []Att    []TENS instruct  []NMES                    []Other:  []w/US   []w/ice   []w/heat  Position:  Location:    []  Traction: [] Cervical       []Lumbar                       [] Prone          []Supine                       []Intermittent   []Continuous Lbs:  [] before manual  [] after manual    []  Ultrasound: []Continuous   [] Pulsed                           []1MHz   []3MHz W/cm2:  Location:    []  Iontophoresis with dexamethasone         Location: [] Take home patch   [] In clinic   10 []  Ice     [x]  heat  []  Ice massage  []  Laser   []  Anodyne Position:supine  Location:(B) UT    []  Laser with stim  []  Other:  Position:  Location:    []  Vasopneumatic Device Pressure:       [] lo [] med [] hi   Temperature: [] lo [] med [] hi   [x] Skin assessment post-treatment:  [x]intact []redness- no adverse reaction    []redness  adverse reaction:      min []Eval []Re-Eval       24 min Therapeutic Exercise:  [x] See flow sheet :   Rationale: increase ROM and increase strength to improve the patients ability to perform ADL      min Therapeutic Activity:  []  See flow sheet :   Rationale: increase ROM and increase strength  to improve the patients ability to perform ADL      min Neuromuscular Re-education:  []  See flow sheet :   Rationale:   to improve the patients ability to     15 min Manual Therapy:  STM/DTM (B) UT/CSP TX with passive stretch (B) UT   Rationale: decrease pain, increase ROM, increase tissue extensibility and decrease edema  to perform ADL     min Gait Training:  ___ feet with ___ device on level surfaces with ___ level of assist   Rationale: With   [x] TE   [] TA   [] neuro   [] other: Patient Education: [x] Review HEP    [] Progressed/Changed HEP based on:   [] positioning   [] body mechanics   [] transfers   [] heat/ice application    [] other:      Other Objective/Functional Measures:      Pain Level (0-10 scale) post treatment: .5    ASSESSMENT/Changes in Function:  Pt demos tightness at right UT during AROM CSP . Pt responded fairly well to each there ex with cues. Pt benefited with treatment today. Patient will continue to benefit from skilled PT services to address functional mobility deficits, address ROM deficits, address strength deficits, analyze and address soft tissue restrictions and analyze and cue movement patterns to attain remaining goals.      [x]  See Plan of Care  []  See progress note/recertification  []  See Discharge Summary         Progress towards goals / Updated goals:     1 patient will have established and be I with HEP to aid with self management at discharge              EVAL issued              CURRENT  Met 8/28/20              2 patient will tolerate UBE 5 minutes retro and have no increase UE S/S for carryover to increased awareness to posture              EVAL not initiated              CURRENT       Long Term Goals:  To be accomplished in 10 treatments:              1 patient will have pain 1/10 to aid with increase tolerance to sleep              EVAL 2              CURRENT              2 patient will report overall 50% improvement to aid with increase tolerance to sleep and his activities volunteering              EVAL neck pain and B UE intermittent tingling               CURRENT              3 patient will have B ROT 45 and SB B 20 to aid with increase tolerance to ADLS and activities              EVAL ROT B 38 and SB right 10, left 17              CURRENT    PLAN  []  Upgrade activities as tolerated     []  Continue plan of care  []  Update interventions per flow sheet       []  Discharge due to:_  []  Other:_      Baltazar Callejas PTA 8/28/2020  9:23 AM    Future Appointments   Date Time Provider Sander Tabor   8/31/2020  9:20 AM Reed Burnham MD [address]   9/2/2020  9:00 AM [facility] [address]   9/4/2020  9:00 AM Vanessa Cortes [facility] [address]   9/9/2020  9:00 AM Chanel Trejo PTA [facility] [address]   9/11/2020  9:00 AM Nishi Britt [facility] [address]   10/19/2020  9:15 AM Viktro Pike MD [facility] SCHED   2/8/2021 10:30 AM Shiva Moyer MD [facility]

## 2020-08-28 NOTE — PROGRESS NOTES
Spoke with patient's wife (on PHI) and she verbalized understanding of mild degenerative/arthritic changes on neck xray.  Patient was currently at PT for his eval.

## 2020-08-31 ENCOUNTER — OFFICE VISIT (OUTPATIENT)
Dept: NEUROLOGY | Age: 75
End: 2020-08-31

## 2020-08-31 VITALS
RESPIRATION RATE: 16 BRPM | SYSTOLIC BLOOD PRESSURE: 118 MMHG | DIASTOLIC BLOOD PRESSURE: 78 MMHG | HEART RATE: 68 BPM | OXYGEN SATURATION: 99 % | WEIGHT: 151.6 LBS | BODY MASS INDEX: 22.97 KG/M2 | TEMPERATURE: 96.6 F | HEIGHT: 68 IN

## 2020-08-31 DIAGNOSIS — G56.03 BILATERAL CARPAL TUNNEL SYNDROME: Primary | ICD-10-CM

## 2020-08-31 NOTE — PROGRESS NOTES
Amadou Rico is a 70 y.o. male . presents for New Patient   . A 70years old male patient with medical history of prediabetes, hypertension, and BPH here for evaluation of bilateral numbness and tingling of his hands of more than 6 months duration. Symptoms are progressively getting worse. More severe when he is driving, using a computer, doing something with his hands. He occasionally feels electric shock like sensation over the forearm that go down to the fingers. Symptoms wake him up from sleep. He has mild neck pain over the lower part which sometimes goes down to the shoulder. Denied any electric shocklike sensation from the neck down to the fingers. He has some difficulty holding mainly from arthritis in his hands. For his neck pain, he was started on physical therapy and claims electric shocklike sensation over the right forearm has improved. He has prediabetes and currently not on any medication. No tinging or numbness over the LEs. He has a recent C-spine x-ray which showed mild lower cervical degenerative changes. Review of Systems   Constitutional: Negative for chills, fever and weight loss. HENT: Positive for hearing loss (bilateral). Negative for tinnitus. Eyes: Negative for blurred vision and double vision. Respiratory: Positive for cough (occasional). Negative for hemoptysis, sputum production and shortness of breath. Cardiovascular: Negative for chest pain and leg swelling. Gastrointestinal: Negative for heartburn, nausea and vomiting. Genitourinary: Negative for dysuria, frequency and urgency. Musculoskeletal: Positive for back pain (localized) and neck pain. Skin: Negative for itching and rash. Neurological: Positive for tingling (hands), tremors (action/postural) and sensory change (hands). Negative for dizziness, focal weakness and headaches. Endo/Heme/Allergies: Does not bruise/bleed easily.        Past Medical History:   Diagnosis Date    Basal cell carcinoma     BPH (benign prostatic hyperplasia)     Cancer (HCC)     Basal Cell Carcinoma    Depression     Gastrointestinal disorder     Diverticulitis    GERD (gastroesophageal reflux disease)     History of colon polyps 2/21/2019    Hypertension     Inguinal hernia     left side    Other ill-defined conditions(799.89)     BPH    Psychiatric disorder     Aggression, Depression       Past Surgical History:   Procedure Laterality Date    COLONOSCOPY N/A 2/22/2019    COLONOSCOPY, SURVEILLANCE performed by Jennifer Woody MD at [address] [place] [address] HX COLONOSCOPY      HX HERNIA REPAIR  03/14/2017    HX MALIGNANT SKIN LESION EXCISION          Family History   Problem Relation Age of Onset    Prostate Cancer Father         Social History     Socioeconomic History    Marital status:      Spouse name: Not on file    Number of children: Not on file    Years of education: Not on file    Highest education level: Not on file   Occupational History    Occupation: Retired   Social Needs    Financial resource strain: Not on file    Food insecurity     Worry: Not on file     Inability: Not on file   Treater needs     Medical: Not on file     Non-medical: Not on file   Tobacco Use    Smoking status: Former Smoker    Smokeless tobacco: Never Used    Tobacco comment: quit 1971   Substance and Sexual Activity    Alcohol use:  Yes     Alcohol/week: 1.7 standard drinks     Types: 2 Cans of beer per week     Comment: wine 2-3 x per week with dinner    Drug use: No    Sexual activity: Not Currently     Partners: Female   Lifestyle    Physical activity     Days per week: Not on file     Minutes per session: Not on file    Stress: Not on file   Relationships    Social connections     Talks on phone: Not on file     Gets together: Not on file     Attends Restorationism service: Not on file     Active member of club or organization: Not on file     Attends meetings of clubs or organizations: Not on file Relationship status: Not on file    Intimate partner violence     Fear of current or ex partner: Not on file     Emotionally abused: Not on file     Physically abused: Not on file     Forced sexual activity: Not on file   Other Topics Concern    Not on file   Social History Narrative    ** Merged History Encounter **             No Known Allergies      Current Outpatient Medications   Medication Sig Dispense Refill    alfuzosin SR (UROXATRAL) 10 mg SR tablet TAKE 1 TABLET DAILY AFTER DINNER 90 Tab 3    melatonin 3 mg tablet Take 6 mg by mouth nightly.  omeprazole (PRILOSEC) 20 mg capsule TAKE 1 CAPSULE DAILY (Patient taking differently: two (2) times a day.) 90 Cap 3    lisinopriL (PRINIVIL, ZESTRIL) 10 mg tablet TAKE 1 TABLET DAILY 90 Tab 3    atorvastatin (LIPITOR) 20 mg tablet Take 1 Tab by mouth nightly. 90 Tab 1    finasteride (PROSCAR) 5 mg tablet TAKE 1 TABLET DAILY 90 Tab 1    ibuprofen (MOTRIN IB) 200 mg tablet Take 400 mg by mouth every six (6) hours as needed for Pain.  acetaminophen (TYLENOL) 500 mg tablet Take  by mouth every six (6) hours as needed for Pain.  OTHER L-thiamine 25 mg      citalopram (CELEXA) 10 mg tablet Take 1 Tab by mouth daily. 30 Tab 1    sildenafiL, pulm. hypertension, (REVATIO) 20 mg tablet Take 1 Tab by mouth as needed for Other (ED). Take up to 5 tablets as needed. Do not exceed 5 tablets. Take on an empty stomach. 90 Tab 3    allergy injection            Physical Exam  Constitutional:       Appearance: Normal appearance. HENT:      Head: Normocephalic and atraumatic. Mouth/Throat:      Mouth: Mucous membranes are moist.      Pharynx: Oropharynx is clear. No oropharyngeal exudate. Eyes:      Extraocular Movements: Extraocular movements intact. Pupils: Pupils are equal, round, and reactive to light. Neck:      Musculoskeletal: Normal range of motion and neck supple.    Pulmonary:      Effort: Pulmonary effort is normal. No respiratory distress. Musculoskeletal: Normal range of motion. Right lower leg: No edema. Left lower leg: No edema. Neurological:      Mental Status: He is alert. Comments: Mental status: Awake, alert, oriented x3, follows simple and complex commands. Speech and languge: fluent, coherent, \and comprehension intact  CN: VFF, EOMI, PERRLA, face sensation intact , no facial asymmetry noted, palate elevation symmetric bilat, SS+SCM 5/5 bilat, tongue midline  Motor: no pronator drift, tone normal throughout, strength 5/5 throughout  Sensory: intact to light touch and PP  throughout  Coordination: FNF, HS accurate w/o dysmetria. There is mild postural tremor of the UEs, R>L. DTR: 2+ throughout  Gait: normal.    Negative Phalen and Tinel. Hospital Outpatient Visit on 08/24/2020   Component Date Value Ref Range Status    TSH 08/24/2020 1.01  0.36 - 3.74 uIU/mL Final   Hospital Outpatient Visit on 06/03/2020   Component Date Value Ref Range Status    Estrogens, total 06/03/2020 179* 40 - 115 pg/mL Final    Comment: (NOTE)  Performed At: [address] [id number]  Kim Contreras MD TK:[id number]      Prolactin 06/03/2020 6.0  ng/mL Final    Males:            2.1-17.7    ng/mL    Testosterone 06/03/2020 448  264 - 916 ng/dL Final    Comment: (NOTE)  Adult male reference interval is based on a population of  healthy nonobese males (BMI <30) between 23and 44years old. [address] [place] [address], [address] [place] [address] [id number]. PMID: [id number].   Performed At: [facility]  [employer] [id number]  Kim Contreras MD IO:[id number]      Free testosterone (Direct) 06/03/2020 12.5  6.6 - 18.1 pg/mL Final    Comment: (NOTE)  Performed At: [facility] [address] [id number]  Kim Contreras MD XH:[id number]     Hospital Outpatient Visit on 06/03/2020   Component Date Value Ref Range Status    LIPID PROFILE 06/03/2020        Final  Cholesterol, total 06/03/2020 177  <200 MG/DL Final    Triglyceride 06/03/2020 66  <150 MG/DL Final    Comment: The drugs N-acetylcysteine (NAC) and  Metamiszole have been found to cause falsely  low results in this chemical assay. Please  be sure to submit blood samples obtained  BEFORE administration of either of these  drugs to assure correct results.  HDL Cholesterol 06/03/2020 100* 40 - 60 MG/DL Final    LDL, calculated 06/03/2020 63.8  0 - 100 MG/DL Final    VLDL, calculated 06/03/2020 13.2  MG/DL Final    CHOL/HDL Ratio 06/03/2020 1.8  0 - 5.0   Final    Sodium 06/03/2020 144  136 - 145 mmol/L Final    Potassium 06/03/2020 4.9  3.5 - 5.5 mmol/L Final    Chloride 06/03/2020 111  100 - 111 mmol/L Final    CO2 06/03/2020 30  21 - 32 mmol/L Final    Anion gap 06/03/2020 3  3.0 - 18 mmol/L Final    Glucose 06/03/2020 104* 74 - 99 mg/dL Final    BUN 06/03/2020 24* 7.0 - 18 MG/DL Final    Creatinine 06/03/2020 0.98  0.6 - 1.3 MG/DL Final    BUN/Creatinine ratio 06/03/2020 24* 12 - 20   Final    GFR est AA 06/03/2020 >60  >60 ml/min/1.73m2 Final    GFR est non-AA 06/03/2020 >60  >60 ml/min/1.73m2 Final    Comment: (NOTE)  Estimated GFR is calculated using the Modification of Diet in Renal   Disease (MDRD) Study equation, reported for both  Americans   (GFRAA) and non- Americans (GFRNA), and normalized to 1.73m2   body surface area. The physician must decide which value applies to   the patient. The MDRD study equation should only be used in   individuals age 25 or older. It has not been validated for the   following: pregnant women, patients with serious comorbid conditions,   or on certain medications, or persons with extremes of body size,   muscle mass, or nutritional status.       Calcium 06/03/2020 9.1  8.5 - 10.1 MG/DL Final    Bilirubin, total 06/03/2020 0.6  0.2 - 1.0 MG/DL Final    ALT (SGPT) 06/03/2020 16  16 - 61 U/L Final    AST (SGOT) 06/03/2020 12  10 - 38 U/L Final    Alk. phosphatase 06/03/2020 86  45 - 117 U/L Final    Protein, total 06/03/2020 6.7  6.4 - 8.2 g/dL Final    Albumin 06/03/2020 4.0  3.4 - 5.0 g/dL Final    Globulin 06/03/2020 2.7  2.0 - 4.0 g/dL Final    A-G Ratio 06/03/2020 1.5  0.8 - 1.7   Final    Hemoglobin A1c 06/03/2020 5.9* 4.2 - 5.6 % Final    Comment: (NOTE)  HbA1C Interpretive Ranges  <5.7              Normal  5.7 - 6.4         Consider Prediabetes  >6.5              Consider Diabetes      Est. average glucose 06/03/2020 123  mg/dL Final    Comment: (NOTE)  The eAG should be interpreted with patient characteristics in mind   since ethnicity, interindividual differences, red cell lifespan,   variation in rates of glycation, etc. may affect the validity of the   calculation.  Microalbumin,urine random 06/03/2020 0.73  0 - 3.0 MG/DL Final    Creatinine, urine 06/03/2020 141.00* 30 - 125 mg/dL Final    Microalbumin/Creat ratio (mg/g cre* 06/03/2020 5  0 - 30 mg/g Final             ICD-10-CM ICD-9-CM    1. Bilateral carpal tunnel syndrome  G56.03 354.0 EMG LIMITED     70years old male patient with above medical problems here for evaluation of bilateral hand numbness, right more than the left. Has associated tingling but no burning sensation. Symptoms get worse when he is using his hands more. Wakes him up at night. Possible bilateral carpal tunnel syndrome. Negative Tinel and Phalen. Advised him to use a hand splint. Will do nerve conduction study of the upper extremities. We will see him in 3 months time as needed.

## 2020-09-02 ENCOUNTER — HOSPITAL ENCOUNTER (OUTPATIENT)
Dept: PHYSICAL THERAPY | Age: 75
Discharge: HOME | End: 2020-09-02
Payer: MEDICARE

## 2020-09-02 PROCEDURE — 97140 MANUAL THERAPY 1/> REGIONS: CPT

## 2020-09-02 PROCEDURE — 97110 THERAPEUTIC EXERCISES: CPT

## 2020-09-02 NOTE — PROGRESS NOTES
PT DAILY TREATMENT NOTE 10-18    Patient Name: Amadou Rico  Date:2020  : 1945  [x]  Patient  Verified  Payor: [employer] Del [employer]en Lester / Plan: [place] MEDICARE PART A & B / Product Type: Medicare /    In time:9:00 Out time:9:57  Total Treatment Time (min):49   Visit #: 3 of 10    Medicare/BCBS Only   Total Timed Codes (min): 39 1:1 Treatment Time:  39       Treatment Area: Cervicalgia [M54.2]    SUBJECTIVE  Pain Level (0-10 scale): 2  Any medication changes, allergies to medications, adverse drug reactions, diagnosis change, or new procedure performed?: [x] No    [] Yes (see summary sheet for update)  Subjective functional status/changes:   [] No changes reported  \"I haven't had the pain shooting down my arm and the numbness sine I came to PT. \"    OBJECTIVE    Modality rationale: decrease edema, decrease inflammation, decrease pain and increase tissue extensibility to improve the patients ability to perform ADL    Min Type Additional Details    [] Estim:  []Unatt       []IFC  []Premod                        []Other:  []w/ice   []w/heat  Position:  Location:    [] Estim: []Att    []TENS instruct  []NMES                    []Other:  []w/US   []w/ice   []w/heat  Position:  Location:    []  Traction: [] Cervical       []Lumbar                       [] Prone          []Supine                       []Intermittent   []Continuous Lbs:  [] before manual  [] after manual    []  Ultrasound: []Continuous   [] Pulsed                           []1MHz   []3MHz W/cm2:  Location:    []  Iontophoresis with dexamethasone         Location: [] Take home patch   [] In clinic   10 []  Ice     [x]  Heat post  []  Ice massage  []  Laser   []  Anodyne Position:supine  Location:(B) UT    []  Laser with stim  []  Other:  Position:  Location:    []  Vasopneumatic Device Pressure:       [] lo [] med [] hi   Temperature: [] lo [] med [] hi   [x] Skin assessment post-treatment:  [x]intact []redness- no adverse reaction    []redness  adverse reaction: min []Eval                  []Re-Eval       24 min Therapeutic Exercise:  [x] See flow sheet :   Rationale: increase ROM and increase strength to improve the patients ability to perform ADL      min Therapeutic Activity:  []  See flow sheet :   Rationale: increase ROM and increase strength  to improve the patients ability to perform ADL       min Neuromuscular Re-education:  []  See flow sheet :   Rationale:   to improve the patients ability to     15 min Manual Therapy:  STM/DTM (B) UT seated; CSP TX with passive stretch right UT supine   Rationale: decrease pain, increase ROM, increase tissue extensibility and decrease edema  to perform ADL      min Gait Training:  ___ feet with ___ device on level surfaces with ___ level of assist   Rationale: With   [x] TE   [] TA   [] neuro   [] other: Patient Education: [x] Review HEP    [] Progressed/Changed HEP based on:   [] positioning   [] body mechanics   [] transfers   [] heat/ice application    [] other:      Other Objective/Functional Measures:      Pain Level (0-10 scale) post treatment: 1    ASSESSMENT/Changes in Function: Pt experienced slight discomfort at right leavtor with passive stretch at the right. Pt completed each there ex fairly well with cues. Following treatment pt experienced minimal pain. Patient will continue to benefit from skilled PT services to address functional mobility deficits, address ROM deficits, address strength deficits, analyze and address soft tissue restrictions and analyze and cue movement patterns to attain remaining goals.      [x]  See Plan of Care  []  See progress note/recertification  []  See Discharge Summary         Progress towards goals / Updated goals:   1 patient will have established and be I with HEP to aid with self management at discharge              EVAL issued              CURRENT  Met 8/28/20              2 patient will tolerate UBE 5 minutes retro and have no increase UE S/S for carryover to increased awareness to posture              EVAL not initiated              CURRENTmet  9/2/20       Long Term Goals: To be accomplished in 10 treatments:              1 patient will have pain 1/10 to aid with increase tolerance to sleep              EVAL 2              CURRENT              2 patient will report overall 50% improvement to aid with increase tolerance to sleep and his activities volunteering              EVAL neck pain and B UE intermittent tingling               CURRENT              3 patient will have B ROT 45 and SB B 20 to aid with increase tolerance to ADLS and activities              EVAL ROT B 38 and SB right 10, left 17              CURRENT    PLAN  []  Upgrade activities as tolerated     [x]  Continue plan of care  []  Update interventions per flow sheet       []  Discharge due to:_  []  Other:_      Vanessa Cortes PTA 9/2/2020  9:11 AM    Future Appointments   Date Time Provider Sander Tabor   9/4/2020  9:00 AM Tammy Patiño PTA [facility]   9/9/2020  9:00 AM Tammy Patiño PTA [facility]   9/11/2020  9:00 AM Yariel Read [facility]   10/19/2020  9:15 AM Jak Lane MD [facility] SCHED   11/30/2020  8:40 AM Berta Peters MD [address]   2/8/2021 10:30 AM Anu Keith MD [facility]

## 2020-09-04 ENCOUNTER — HOSPITAL ENCOUNTER (OUTPATIENT)
Dept: PHYSICAL THERAPY | Age: 75
Discharge: HOME | End: 2020-09-04
Payer: MEDICARE

## 2020-09-04 PROCEDURE — 97140 MANUAL THERAPY 1/> REGIONS: CPT

## 2020-09-04 PROCEDURE — 97110 THERAPEUTIC EXERCISES: CPT

## 2020-09-04 NOTE — PROGRESS NOTES
PT DAILY TREATMENT NOTE 10-18    Patient Name: Ana Maria Shaikh  Date:2020  : 1945  [x]  Patient  Verified  Payor: Leila Trinh / Plan: [place] & [place] / Product Type: Medicare /    In time: 9:00 Out time:10:  Total Treatment Time (min): 50  Visit #: 4 of 10    Medicare/BCBS Only   Total Timed Codes (min):  40 1:1 Treatment Time:  40       Treatment Area: Cervicalgia [M54.2]    SUBJECTIVE  Pain Level (0-10 scale): 2  Any medication changes, allergies to medications, adverse drug reactions, diagnosis change, or new procedure performed?: [x] No    [] Yes (see summary sheet for update)  Subjective functional status/changes:   [] No changes reported  \"Pain is still there. \"    OBJECTIVE    Modality rationale: decrease edema, decrease inflammation, decrease pain and increase tissue extensibility to improve the patients ability to perform ADL    Min Type Additional Details    [] Estim:  []Unatt       []IFC  []Premod                        []Other:  []w/ice   []w/heat  Position:  Location:    [] Estim: []Att    []TENS instruct  []NMES                    []Other:  []w/US   []w/ice   []w/heat  Position:  Location:    []  Traction: [] Cervical       []Lumbar                       [] Prone          []Supine                       []Intermittent   []Continuous Lbs:  [] before manual  [] after manual    []  Ultrasound: []Continuous   [] Pulsed                           []1MHz   []3MHz W/cm2:  Location:    []  Iontophoresis with dexamethasone         Location: [] Take home patch   [] In clinic   10 []  Ice     [x]  heat  []  Ice massage  []  Laser   []  Anodyne Position:supine  Location:(B) UT    []  Laser with stim  []  Other:  Position:  Location:    []  Vasopneumatic Device Pressure:       [] lo [] med [] hi   Temperature: [] lo [] med [] hi   [x] Skin assessment post-treatment:  [x]intact []redness- no adverse reaction    []redness  adverse reaction:      min []Eval                  []Re-Eval       25 min Therapeutic Exercise:  [x] See flow sheet :   Rationale: increase ROM and increase strength to improve the patients ability to perform ADL      min Therapeutic Activity:  [x]  See flow sheet :   Rationale: increase ROM and increase strength  to improve the patients ability to perform ADL      min Neuromuscular Re-education:  []  See flow sheet :   Rationale:   to improve the patients ability to     15 min Manual Therapy:  STM/DTM right UT seated;CSP TX  supine   Rationale: decrease pain, increase ROM, increase tissue extensibility and decrease edema  to perform ADL      min Gait Training:  ___ feet with ___ device on level surfaces with ___ level of assist   Rationale: With   [x] TE   [] TA   [] neuro   [] other: Patient Education: [x] Review HEP    [] Progressed/Changed HEP based on:   [] positioning   [] body mechanics   [] transfers   [] heat/ice application    [] other:      Other Objective/Functional Measures:      Pain Level (0-10 scale) post treatment: 0  ASSESSMENT/Changes in Function: Pt experienced increased pain with neck stretches. Discussed with pt on holding stretches at short time. Pt reports pain at the neck is the same. But when he receives the manual pain is resolved. Pt presents with tightness at right UT/leavator muscles. Patient will continue to benefit from skilled PT services to address functional mobility deficits, address ROM deficits, address strength deficits, analyze and address soft tissue restrictions and analyze and cue movement patterns to attain remaining goals.      [x]  See Plan of Care  []  See progress note/recertification  []  See Discharge Summary         Short Term Goals:             1 patient will have established and be I with HEP to aid with self management at discharge              EVAL issued              CURRENT  Met 8/28/20              2 patient will tolerate UBE 5 minutes retro and have no increase UE S/S for carryover to increased awareness to posture              EVAL not initiated              CURRENTmet  9/2/20       Long Term Goals: To be accomplished in 10 treatments:              1 patient will have pain 1/10 to aid with increase tolerance to sleep              EVAL 2              CURRENT  Progressing 9/4/20              2 patient will report overall 50% improvement to aid with increase tolerance to sleep and his activities volunteering              EVAL neck pain and B UE intermittent tingling               CURRENT              3 patient will have B ROT 45 and SB B 20 to aid with increase tolerance to ADLS and activities              EVAL ROT B 38 and SB right 10, left 17              CURRENT      PLAN  []  Upgrade activities as tolerated     []  Continue plan of care  []  Update interventions per flow sheet       []  Discharge due to:_  []  Other:_      Chaparro Rankin PTA 9/4/2020  9:05 AM    Future Appointments   Date Time Provider Sander Tabor   9/9/2020  9:00 AM [facility]   9/11/2020  9:00 AM [facility]   10/19/2020  9:15 AM Imelda Tran MD [facility]   11/30/2020  8:40 AM Trixie Morgan MD [address]   2/8/2021 10:30 AM Matilde Montoya MD [facility]

## 2020-09-09 ENCOUNTER — HOSPITAL ENCOUNTER (OUTPATIENT)
Dept: PHYSICAL THERAPY | Age: 75
Discharge: HOME | End: 2020-09-09
Payer: MEDICARE

## 2020-09-09 PROCEDURE — 97140 MANUAL THERAPY 1/> REGIONS: CPT

## 2020-09-09 PROCEDURE — 97110 THERAPEUTIC EXERCISES: CPT

## 2020-09-09 NOTE — PROGRESS NOTES
PT DAILY TREATMENT NOTE 10-18    Patient Name: Kacy Davidson  Date:2020  : 1945  [x]  Patient  Verified  Payor: Juvenal Jaramillo / Plan: [place] MEDICARE PART A & B / Product Type: Medicare /    In time: 9:00 Out time: 9:54  Total Treatment Time (min): 43  Visit #: 5 of 10    Medicare/BCBS Only   Total Timed Codes (min):  43 1:1 Treatment Time:  24       Treatment Area: Cervicalgia [M54.2]    SUBJECTIVE  Pain Level (0-10 scale): 1  Any medication changes, allergies to medications, adverse drug reactions, diagnosis change, or new procedure performed?: [x] No    [] Yes (see summary sheet for update)  Subjective functional status/changes:   [] No changes reported  \"I see some improvement. \"    OBJECTIVE    Modality rationale: decrease edema, decrease inflammation, decrease pain and increase tissue extensibility to improve the patients ability to perform ADL    Min Type Additional Details    [] Estim:  []Unatt       []IFC  []Premod                        []Other:  []w/ice   []w/heat  Position:  Location:    [] Estim: []Att    []TENS instruct  []NMES                    []Other:  []w/US   []w/ice   []w/heat  Position:  Location:    []  Traction: [] Cervical       []Lumbar                       [] Prone          []Supine                       []Intermittent   []Continuous Lbs:  [] before manual  [] after manual    []  Ultrasound: []Continuous   [] Pulsed                           []1MHz   []3MHz W/cm2:  Location:    []  Iontophoresis with dexamethasone         Location: [] Take home patch   [] In clinic    []  Ice     []  heat  []  Ice massage  []  Laser   []  Anodyne Position:  Location:    []  Laser with stim  []  Other:  Position:  Location:    []  Vasopneumatic Device Pressure:       [] lo [] med [] hi   Temperature: [] lo [] med [] hi   [x] Skin assessment post-treatment:  [x]intact []redness- no adverse reaction    []redness  adverse reaction:      min []Eval                  []Re-Eval       25 min Therapeutic Exercise:  [x] See flow sheet :   Rationale: increase ROM and improve coordination to improve the patients ability to perform ADL      min Therapeutic Activity:  []  See flow sheet :   Rationale: increase ROM and increase strength  to improve the patients ability to perform ADL       min Neuromuscular Re-education:  []  See flow sheet :   Rationale:   to improve the patients ability to     18 min Manual Therapy: STM/DTM right UT seated/CSP TX with passive stretch (B) UT  supine   Rationale: decrease pain, increase ROM, increase tissue extensibility and decrease edema  to perform ADL     min Gait Training:  ___ feet with ___ device on level surfaces with ___ level of assist   Rationale: With   [x] TE   [] TA   [] neuro   [] other: Patient Education: [x] Review HEP    [] Progressed/Changed HEP based on:   [] positioning   [] body mechanics   [] transfers   [] heat/ice application    [] other:      Other Objective/Functional Measures:      Pain Level (0-10 scale) post treatment: 0    ASSESSMENT/Changes in Function:  Pt presents with tightness at (B) UT with manual today. Pt completed each there ex fairly well. Pt c/o numbness at right upper arm to elbow today. Patient will continue to benefit from skilled PT services to address functional mobility deficits, address ROM deficits, address strength deficits, analyze and address soft tissue restrictions and analyze and cue movement patterns to attain remaining goals.      [x]  See Plan of Care  []  See progress note/recertification  []  See Discharge Summary         Progress towards goals / Updated goals:    1 patient will have established and be I with HEP to aid with self management at discharge              EVAL issued              CURRENT  Met 8/28/20              2 patient will tolerate UBE 5 minutes retro and have no increase UE S/S for carryover to increased awareness to posture              EVAL not initiated              CURRENTmet  9/2/20       Long Term Goals: To be accomplished in 10 treatments:              1 patient will have pain 1/10 to aid with increase tolerance to sleep              EVAL 2              CURRENT  Progressing 9/4/20              2 patient will report overall 50% improvement to aid with increase tolerance to sleep and his activities volunteering              EVAL neck pain and B UE intermittent tingling               CURRENT              3 patient will have B ROT 45 and SB B 20 to aid with increase tolerance to ADLS and activities              EVAL ROT B 38 and SB right 10, left 17              CURRENT    PLAN  []  Upgrade activities as tolerated     []  Continue plan of care  []  Update interventions per flow sheet       []  Discharge due to:_  [x]  Other:_  REASSESS ROM NV    Vanessa Sophia, PTA 9/9/2020  9:31 AM    Future Appointments   Date Time Provider Sander Tabor   9/11/2020  9:00 AM Kike Courser [facility]   10/19/2020  9:15 AM Razia Valles MD [facility]   11/30/2020  8:40 AM Tatyana Pal MD [address]   2/8/2021 10:30 AM Ephraim Valadez MD [facility]

## 2020-09-11 ENCOUNTER — HOSPITAL ENCOUNTER (OUTPATIENT)
Dept: PHYSICAL THERAPY | Age: 75
Discharge: HOME | End: 2020-09-11
Payer: MEDICARE

## 2020-09-11 PROCEDURE — 97112 NEUROMUSCULAR REEDUCATION: CPT

## 2020-09-11 PROCEDURE — 97110 THERAPEUTIC EXERCISES: CPT

## 2020-09-11 PROCEDURE — 97140 MANUAL THERAPY 1/> REGIONS: CPT

## 2020-09-11 NOTE — PROGRESS NOTES
PT DAILY TREATMENT NOTE 10-18    Patient Name: Geo Mac  Date:2020  : 1945  [x]  Patient  Verified  Payor: [place] MEDICARE / Plan: [place] MEDICARE PART A & B / Product Type: Medicare /    In time:9:12  Out time:9:54  Total Treatment Time (min): 42  Visit #: 6 of 10    Medicare/BCBS Only   Total Timed Codes (min):  42 1:1 Treatment Time:  42       Treatment Area: Cervicalgia [M54.2]    SUBJECTIVE  Pain Level (0-10 scale): 1  Any medication changes, allergies to medications, adverse drug reactions, diagnosis change, or new procedure performed?: [x] No    [] Yes (see summary sheet for update)  Subjective functional status/changes:   [] No changes reported  Pt states the last two days have been pretty good, didn't need motrin even with the rain    OBJECTIVE    20 min Therapeutic Exercise:  [x] See flow sheet :   Rationale: increase ROM and increase strength to improve the patients ability to perform ADLs    10 min Neuromuscular Re-education:  [x]  See flow sheet :   Rationale: increase strength and increase proprioception  to improve the patients ability to perform functional tasks    12 min Manual Therapy:  SOR, manual c/s traction, DTM/TRP to [place] UT/LS and c/s paraspinals, grade2-3 c/s PA and rot mobs   Rationale: decrease pain, increase ROM and increase tissue extensibility to improve functional mobility          With   [] TE   [] TA   [] neuro   [] other: Patient Education: [x] Review HEP    [] Progressed/Changed HEP based on:   [] positioning   [] body mechanics   [] transfers   [] heat/ice application    [] other:      Other Objective/Functional Measures:   Postural cues to avoid forward head with therex     Pain Level (0-10 scale) post treatment: 0    ASSESSMENT/Changes in Function: Vc's throughout treatment to avoid forward head and over use of UT. Noted ms tension and trigger points @ left UT and c/s paraspinals. Observed improved c/s rot following manual therapy today.  General reports of improvement since [facility] including decr'd pain. Patient will continue to benefit from skilled PT services to modify and progress therapeutic interventions, address functional mobility deficits, address ROM deficits, address strength deficits, analyze and address soft tissue restrictions, analyze and cue movement patterns, analyze and modify body mechanics/ergonomics and assess and modify postural abnormalities to attain remaining goals.      []  See Plan of Care  []  See progress note/recertification  []  See Discharge Summary         Progress towards goals / Updated goals:    1 patient will have established and be I with HEP to aid with self management at discharge              EVAL issued              CURRENT  Met 8/28/20              2 patient will tolerate UBE 5 minutes retro and have no increase UE S/S for carryover to increased awareness to posture              EVAL not initiated              CURRENTmet  9/2/20       Long Term Goals: To be accomplished in 10 treatments:              1 patient will have pain 1/10 to aid with increase tolerance to sleep              EVAL 2              CURRENT  Progressing 9/4/20              2 patient will report overall 50% improvement to aid with increase tolerance to sleep and his activities volunteering              EVAL neck pain and B UE intermittent tingling               CURRENT              3 patient will have B ROT 45 and SB B 20 to aid with increase tolerance to ADLS and activities              EVAL ROT B 38 and SB right 10, left 17              CURRENT    PLAN  []  Upgrade activities as tolerated     [x]  Continue plan of care  []  Update interventions per flow sheet       []  Discharge due to:_  []  Other:_      Duglas Lancaster, PTA 9/11/2020  9:18 AM    Future Appointments   Date Time Provider Sander Tabor   9/16/2020  9:00 AM Yg Andrews, PT [facility]   9/18/2020  9:00 AM Yg Andrews, PT [facility]   9/23/2020  9:00 AM Redavidson Andrews, PT [facility]   9/25/2020 9:00 AM Demetra Syed, PT [facility]   10/19/2020  9:15 AM Amanda Hines MD [facility]   11/30/2020  8:40 AM Barbi Ortiz MD [address]   2/8/2021 10:30 AM Jon Reyes MD [facility]

## 2020-09-16 ENCOUNTER — HOSPITAL ENCOUNTER (OUTPATIENT)
Dept: PHYSICAL THERAPY | Age: 75
Discharge: HOME | End: 2020-09-16
Payer: MEDICARE

## 2020-09-16 PROCEDURE — 97110 THERAPEUTIC EXERCISES: CPT

## 2020-09-16 PROCEDURE — 97140 MANUAL THERAPY 1/> REGIONS: CPT

## 2020-09-16 PROCEDURE — 97530 THERAPEUTIC ACTIVITIES: CPT

## 2020-09-16 NOTE — PROGRESS NOTES
PT DAILY TREATMENT NOTE 10-18    Patient Name: Zahraa Montoya  Date:2020  : 1945  [x]  Patient  Verified  Payor: [place]  / Plan: [place] MEDICARE PART A & B / Product Type: Medicare /    In time:900  Out time:949  Total Treatment Time (min): 49  Visit #: 7 of 10    Medicare/BCBS Only   Total Timed Codes (min):  49 1:1 Treatment Time:  40       Treatment Area: Cervicalgia [M54.2]    SUBJECTIVE  Pain Level (0-10 scale): 2  Any medication changes, allergies to medications, adverse drug reactions, diagnosis change, or new procedure performed?: [x] No    [] Yes (see summary sheet for update)  Subjective functional status/changes:   [] No changes reported  \"The worst pain is after I do the stretch down to the side (left UT ) and come back up to the middle - pain hurts/pulls on the right side. I am wearing the carpal tunnel brace on the right and I am not waking up at all anymore due to the pain etc.   I am no longer having the severe pain I was getting in the right forearm. \"    OBJECTIVE     20 min Therapeutic Exercise:  [x] See flow sheet :   Rationale: increase ROM, increase strength and improve coordination to improve the patients ability to tolerate ADLS and activiites    14 min Therapeutic Activity:  [x]  See flow sheet :   Rationale: increase ROM, increase strength and improve coordination  to improve the patients ability to tolerate ADLS and activities        15 min Manual Therapy:  CTX, SOR, Csp ROM.  B UT TPR, grade 2 Csp mobs all in supine   Rationale: decrease pain, increase ROM, increase tissue extensibility and decrease trigger points to tolerate ADLs and activities             With   [x] TE   [] TA   [] neuro   [] other: Patient Education: [x] Review HEP    [x] Progressed/Changed HEP based on:   [] positioning   [] body mechanics   [] transfers   [] heat/ice application    [] other:      Other Objective/Functional Measures: VC exercises and technique     Pain Level (0-10 scale) post treatment: 2    ASSESSMENT/Changes in Function: pain 2 today on medication. Subjectively noting improvement. He has not had an EMG yet. He is getting benefit from right Carpal tunnel brace     Patient will continue to benefit from skilled PT services to modify and progress therapeutic interventions, address ROM deficits, address strength deficits, analyze and address soft tissue restrictions, analyze and cue movement patterns, analyze and modify body mechanics/ergonomics, assess and modify postural abnormalities and instruct in home and community integration to attain remaining goals.      []  See Plan of Care  []  See progress note/recertification  []  See Discharge Summary         Progress towards goals / Updated goals:    1 patient will have established and be I with HEP to aid with self management at discharge              EVAL issued              CURRENT  Met 8/28/20 9/16              2 patient will tolerate UBE 5 minutes retro and have no increase UE S/S for carryover to increased awareness to posture              EVAL not initiated              CURRENTmet  9/2/20 9/16     Long Term Goals: To be accomplished in 10 treatments:              1 patient will have pain 1/10 to aid with increase tolerance to sleep              EVAL 2              CURRENT  2 9/16              2 patient will report overall 50% improvement to aid with increase tolerance to sleep and his activities volunteering              EVAL neck pain and B UE intermittent tingling               CURRENT no % but notes improvement 9/16              3 patient will have B ROT 45 and SB B 20 to aid with increase tolerance to ADLS and activities              EVAL ROT B 38 and SB right 10, left 17              CURRENT NA 9/16    PLAN  [x]  Upgrade activities as tolerated     [x]  Continue plan of care  []  Update interventions per flow sheet       []  Discharge due to:_  []  Other:_      Katerina Farr, PT 9/16/2020  9:08 AM    Future Appointments   Date Time Provider Sander Sharona   9/18/2020  9:00 AM Alexis Cheema, PT [facility]   9/23/2020  9:00 AM Alexis Cheema, PT [facility]   9/25/2020  9:45 AM Alexis Cheema, PT [facility]   10/19/2020  9:15 AM Jodi Xie MD [facility]   11/30/2020  8:40 AM Bill Alatorre MD [address]   2/8/2021 10:30 AM Desire Aldana MD [facility]

## 2020-09-18 ENCOUNTER — HOSPITAL ENCOUNTER (OUTPATIENT)
Dept: PHYSICAL THERAPY | Age: 75
Discharge: HOME | End: 2020-09-18
Payer: MEDICARE

## 2020-09-18 PROCEDURE — 97110 THERAPEUTIC EXERCISES: CPT

## 2020-09-18 PROCEDURE — 97140 MANUAL THERAPY 1/> REGIONS: CPT

## 2020-09-18 PROCEDURE — 97530 THERAPEUTIC ACTIVITIES: CPT

## 2020-09-18 NOTE — PROGRESS NOTES
PT DAILY TREATMENT NOTE 10-18    Patient Name: Nigel Holloway  Date:2020  : 1945  [x]  Patient  Verified  Payor: [place] MEDICARE / Plan: [place] MEDICARE PART A & B / Product Type: Medicare /    In time:902  Out time:959  Total Treatment Time (min): 62  Visit #: 8 of 10    Medicare/BCBS Only   Total Timed Codes (min):  47 1:1 Treatment Time:  40       Treatment Area: Cervicalgia [M54.2]    SUBJECTIVE  Pain Level (0-10 scale): 2-3   Any medication changes, allergies to medications, adverse drug reactions, diagnosis change, or new procedure performed?: [x] No    [] Yes (see summary sheet for update)  Subjective functional status/changes:   [] No changes reported  \" I am sore today. I have been pretty sore the last few days- weather , arthritis, PT. Tresea Miguel Tresea Miguel \"  Reports EMG scheduled for 10/14.     OBJECTIVE    Modality rationale: decrease pain and increase tissue extensibility to improve the patients ability to tolerate ADLs and activities   Min Type Additional Details    [] Estim:  []Unatt       []IFC  []Premod                        []Other:  []w/ice   []w/heat  Position:  Location:    [] Estim: []Att    []TENS instruct  []NMES                    []Other:  []w/US   []w/ice   []w/heat  Position:  Location:    []  Traction: [] Cervical       []Lumbar                       [] Prone          []Supine                       []Intermittent   []Continuous Lbs:  [] before manual  [] after manual    []  Ultrasound: []Continuous   [] Pulsed                           []1MHz   []3MHz W/cm2:  Location:    []  Iontophoresis with dexamethasone         Location: [] Take home patch   [] In clinic   10 []  Ice     [x]  Heat   Post   []  Ice massage  []  Laser   []  Anodyne Position:supine  Location:Csp    []  Laser with stim  []  Other:  Position:  Location:    []  Vasopneumatic Device Pressure:       [] lo [] med [] hi   Temperature: [] lo [] med [] hi   [] Skin assessment post-treatment:  []intact []redness- no adverse reaction []redness  adverse reaction:          20 min Therapeutic Exercise:  [x] See flow sheet :   Rationale: increase ROM, increase strength and improve coordination to improve the patients ability to tolerate ADLs and activities    12 min Therapeutic Activity:  [x]  See flow sheet :   Rationale: increase ROM, increase strength and improve coordination  to improve the patients ability to tolerate ADLS and activities          15 min Manual Therapy:  CTX, SOR, Csp ROM. B UT, grade 2 Csp mobs all in supine   Rationale: decrease pain, increase ROM and increase tissue extensibility to tolerate ADLs and activities           With   [x] TE   [x] TA   [] neuro   [] other: Patient Education: [x] Review HEP    [x] Progressed/Changed HEP based on:   [] positioning   [] body mechanics   [] transfers   [x] heat/ice application    [x] other: educated on safety with gym based activities when he does decide to attempt return. Other Objective/Functional Measures: Increased UBE to level 2.5   VC exercises and tech, Modified pec stretch to dooorway  Pain Level (0-10 scale) post treatment: 2-3    ASSESSMENT/Changes in Function: Increased soreness today right suboccipital and UT/Levator. Residual STC/Trigger point B UT. Increased pain today attributes to weather, arthritis and last PT session. Patient will continue to benefit from skilled PT services to modify and progress therapeutic interventions, address ROM deficits, address strength deficits, analyze and address soft tissue restrictions, analyze and cue movement patterns, analyze and modify body mechanics/ergonomics, assess and modify postural abnormalities and instruct in home and community integration to attain remaining goals.      [x]  See Plan of Care  []  See progress note/recertification  []  See Discharge Summary         Progress towards goals / Updated goals:     1 patient will have established and be I with HEP to aid with self management at discharge              EVAL issued              CURRENT  Met 9/18              2 patient will tolerate UBE 5 minutes retro and have no increase UE S/S for carryover to increased awareness to posture              EVAL not initiated              CURRENTmet  9/18     Long Term Goals: To be accomplished in 10 treatments:              1 patient will have pain 1/10 to aid with increase tolerance to sleep              EVAL 2              CURRENT 2-3 9/18              2 patient will report overall 50% improvement to aid with increase tolerance to sleep and his activities volunteering              EVAL neck pain and B UE intermittent tingling               CURRENT no % but notes improvement 9/16              3 patient will have B ROT 45 and SB B 20 to aid with increase tolerance to ADLS and activities              EVAL ROT B 38 and SB right 10, left 17              CURRENT NA 9/18       PLAN  [x]  Upgrade activities as tolerated     [x]  Continue plan of care  []  Update interventions per flow sheet       []  Discharge due to:_  []  Other:_      Shayne Ferris, PT 9/18/2020  9:26 AM    Future Appointments   Date Time Provider Sander Tabor   9/23/2020  9:00 AM Irlanda Malik, PT [facility]   9/25/2020  9:45 AM Irlanda Malik PT [facility]   10/14/2020  2:00 PM Jameson Mansfield MD [address]   10/19/2020  9:15 AM Shantel Jones MD [facility]   11/30/2020  8:40 AM Jameson Mansfield MD [address]   2/8/2021 10:30 AM Izzy Mcdermott MD [facility]

## 2020-09-23 ENCOUNTER — HOSPITAL ENCOUNTER (OUTPATIENT)
Dept: PHYSICAL THERAPY | Age: 75
Discharge: HOME | End: 2020-09-23
Payer: MEDICARE

## 2020-09-23 PROCEDURE — 97110 THERAPEUTIC EXERCISES: CPT

## 2020-09-23 PROCEDURE — 97530 THERAPEUTIC ACTIVITIES: CPT

## 2020-09-23 PROCEDURE — 97140 MANUAL THERAPY 1/> REGIONS: CPT

## 2020-09-23 NOTE — PROGRESS NOTES
PT DAILY TREATMENT NOTE 10-18    Patient Name: Michelle Rouse  Date:2020  : 1945  [x]  Patient  Verified  Payor: Chandu Cruz / Plan: [place] MEDICARE PART A & B / Product Type: Medicare /    In time:855  Out time:956  Total Treatment Time (min): 61  Visit #: 9 of 10    Medicare/BCBS Only   Total Timed Codes (min):  51 1:1 Treatment Time:  45       Treatment Area: Cervicalgia [M54.2]    SUBJECTIVE  Pain Level (0-10 scale): 2  Any medication changes, allergies to medications, adverse drug reactions, diagnosis change, or new procedure performed?: [x] No    [] Yes (see summary sheet for update)  Subjective functional status/changes:   [] No changes reported  \"You have helped the pain in my forearm, but the numbness is still there and I still have the pain in the right side of my neck. \"    OBJECTIVE    Modality rationale: decrease pain and increase tissue extensibility to improve the patients ability to tolerate ADLs and activities   Min Type Additional Details    [] Estim:  []Unatt       []IFC  []Premod                        []Other:  []w/ice   []w/heat  Position:  Location:    [] Estim: []Att    []TENS instruct  []NMES                    []Other:  []w/US   []w/ice   []w/heat  Position:  Location:    []  Traction: [] Cervical       []Lumbar                       [] Prone          []Supine                       []Intermittent   []Continuous Lbs:  [] before manual  [] after manual    []  Ultrasound: []Continuous   [] Pulsed                           []1MHz   []3MHz W/cm2:  Location:    []  Iontophoresis with dexamethasone         Location: [] Take home patch   [] In clinic   10 []  Ice     [x]  Heat post  []  Ice massage  []  Laser   []  Anodyne Position: supine  Location:Csp, UT    []  Laser with stim  []  Other:  Position:  Location:    []  Vasopneumatic Device Pressure:       [] lo [] med [] hi   Temperature: [] lo [] med [] hi   [] Skin assessment post-treatment:  []intact []redness- no adverse reaction []redness  adverse reaction:          15 min Therapeutic Exercise:  [x] See flow sheet :   Rationale: increase ROM, increase strength and improve coordination to improve the patients ability to tolerate ADLS and activiities    23 min Therapeutic Activity:  [x]  See flow sheet :   Rationale: increase ROM, increase strength and improve coordination  to improve the patients ability to tolerate ADLS and activities        13 min Manual Therapy:  CTX, SOR, Csp ROM. B UT, grade 2 Csp mobs all in supine,    Rationale: decrease pain, increase ROM, increase tissue extensibility and decrease trigger points to tolerate ADLs and acitvities     :          With   [x] TE   [x] TA   [] neuro   [] other: Patient Education: [x] Review HEP    [x] Progressed/Changed HEP based on:   [] positioning   [] body mechanics   [] transfers   [] heat/ice application    [x] other:POC, FOTO , GOALS CHECK      Other Objective/Functional Measures: VC exercises and tech     Pain Level (0-10 scale) post treatment: 2    ASSESSMENT/Changes in Function: 30 day MD note sent,     Patient will continue to benefit from skilled PT services to modify and progress therapeutic interventions, address ROM deficits, address strength deficits, analyze and address soft tissue restrictions, analyze and cue movement patterns, analyze and modify body mechanics/ergonomics, assess and modify postural abnormalities and instruct in home and community integration to attain remaining goals.      [x]  See Plan of Care  [x]  See progress note/recertification  []  See Discharge Summary         Progress towards goals / Updated goals:     1 patient will have established and be I with HEP to aid with self management at discharge              EVAL issued              CURRENT  Met 9/23              2 patient will tolerate UBE 5 minutes retro and have no increase UE S/S for carryover to increased awareness to posture              EVAL not initiated              CURRENTmet [id number] [facility] be accomplished in 10 treatments:              1 patient will have pain 1/10 to aid with increase tolerance to sleep              EVAL 2              CURRENT 2 9/23              2 patient will report overall 50% improvement to aid with increase tolerance to sleep and his activities volunteering              EVAL neck pain and B UE intermittent tingling               CURRENT no % but notes improvement  With decrease right arm pain 1 out of 7 on FOTO  And reports of residual numbness right arm 9/23              3 patient will have B ROT 45 and SB B 20 to aid with increase tolerance to ADLS and activities              EVAL ROT B 38 and SB right 10, left 17              CURRENT B rot 48     SB right 12 left 20  9/23    PLAN  [x]  Upgrade activities as tolerated     [x]  Continue plan of care  []  Update interventions per flow sheet       []  Discharge due to:_  [x]  Other:_ send 30 day note, patient will decide at next appointment regarding discharge V continue X 2 weeks.       Taina Hughes, PT 9/23/2020  9:04 AM    Future Appointments   Date Time Provider Sander Tabor   9/25/2020  9:45 AM [facility]   10/14/2020  2:00 PM Berta Peters MD [facility] BS AMB   10/19/2020  9:15 AM Jak Lane MD [facility] BS AMB   11/30/2020  8:40 AM Berta Peters MD [facility] BS AMB   2/8/2021 10:30 AM Anu Keith MD [facility]

## 2020-09-23 NOTE — PROGRESS NOTES
[facility]  [address] [facility], [address] [place] [address], [facility] [address]  [phone] fax      Continued Plan of Care/ Re-certification for Physical Therapy Services    Patient name: Steve Hitchcock Start of Care: 20   Referral source: Razia Valles MD : 1945   Medical/Treatment Diagnosis: Cervicalgia [M54.2]  Payor: [place] MEDICARE / Plan: VA MEDICARE PART A & B / Product Type: Medicare /  Onset Date:8 months     Prior Hospitalization: see medical history Provider#: [id number]   Medications: Verified on Patient Summary List    Comorbidities: depression, arthritis, HTN, hearing impaired, Cancer 1984 basal cell carcinoma   Prior Level of Function:  able to tolerate all areas of ADLS and activities, exercised at the gym, retired, drives, household and community activities tolerated, 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                               Visits from [facility] Care: 9    Missed Visits: 0    The Plan of Care and following information is based on the patient's current status:  Goal:patient will have established and be I with HEP to aid with self management at discharge  Status at last note/certification:eval  Current Status: met    Goal:patient will tolerate UBE 5 minutes retro and have no increase UE S/S for carryover to increased awareness to posture  Status at last note/certification:eval  Current Status: met    Goal: patient will report overall 50% improvement to aid with increase tolerance to sleep and his activities volunteering  Status at last note/certification:  Current Status: not met , progressing  no % but notes improvement  With decrease right arm pain 1 out of 7 on FOTO  And reports of residual numbness right arm     Goal:patient will have B ROT 45 and SB B 20 to aid with increase tolerance to ADLS and activities  Status at last note/certification:  Current Status: not met, progressing B rot 48     SB right 12 left 20      Key functional changes: increase ROM, increase tolerance to exercise      Problems/ barriers to goal attainment: residual  Right UE numbness,      Problem List: pain affecting function, decrease ROM, decrease ADL/ functional abilitiies, decrease flexibility/ joint mobility and other FOTO 3    Treatment Plan: Therapeutic exercise, Therapeutic activities, Neuromuscular re-education, Physical agent/modality, Manual therapy, Patient education, Self Care training and Home safety training     Patient Goal (s) has been updated and includes: finish      Goals for this certification period to be accomplished in  8   treatments:     1 patient will have pain 1/10 to aid with increase tolerance to sleep            PN  2 9/23              2 patient will report overall 50% improvement to aid with increase tolerance to sleep and his activities volunteering              PN  no % but notes improvement  With decrease right arm pain 1 out of 7 on FOTO  And reports of residual numbness right arm 9/23  3. Patient will have FOTO 75 to show improved function with household and community activities   PN 73               Frequency / Duration: Patient to be seen 1-2 times per week for 4 treatments:    Assessment / Recommendations:Patient demonstrates the potential to make further gains with improving ROM, strength, endurance/activity tolerance, functional FOTO survey score   and all within a reasonable time frame so as to further increase their functional independence with ADLs and activities for carryover to  Improved quality of life, tolerance to household and community activities. . Patient requires skilled Physical Therapy so as to monitor their response to and modify their treatment plan accordingly. Patient appears to be an appropriate candidate for skilled outpatient Physical Therapy.         Certification Period: 9/24/20 - 10/23/20    Nickie Shelby, PT 9/23/2020 9:19 AM    ________________________________________________________________________  I certify that the above Therapy Services are being furnished while the patient is under my care. I agree with the treatment plan and certify that this therapy is necessary. [] I have read the above and request that my patient continue as recommended.   [] I have read the above report and request that my patient continue therapy with the following changes/special instructions: _____________________________________________  [] I have read the above report and request that my patient be discharged from therapy    Physician's Signature:____________Date:_________TIME:________    ** Signature, Date and Time must be completed for valid certification **    Please sign and return to [facility] [address], [address] [place] [address],[facility] [address] [phone] fax

## 2020-09-25 ENCOUNTER — HOSPITAL ENCOUNTER (OUTPATIENT)
Dept: PHYSICAL THERAPY | Age: 75
Discharge: HOME | End: 2020-09-25
Payer: MEDICARE

## 2020-09-25 PROCEDURE — 97112 NEUROMUSCULAR REEDUCATION: CPT

## 2020-09-25 PROCEDURE — 97530 THERAPEUTIC ACTIVITIES: CPT

## 2020-09-25 PROCEDURE — 97110 THERAPEUTIC EXERCISES: CPT

## 2020-09-25 NOTE — PROGRESS NOTES
PT DAILY TREATMENT NOTE 10-18    Patient Name: Nigel Holloway  Date:2020  : 1945  [x]  Patient  Verified  Payor: Allan Lloyd / Plan: [place] MEDICARE PART A & B / Product Type: Medicare /    In time:945  Out time:1041  Total Treatment Time (min): 56  Visit #: 1 of 8    Medicare/BCBS Only   Total Timed Codes (min):  46 1:1 Treatment Time:  38       Treatment Area: Cervicalgia [M54.2]    SUBJECTIVE  Pain Level (0-10 scale): 2  Any medication changes, allergies to medications, adverse drug reactions, diagnosis change, or new procedure performed?: [x] No    [] Yes (see summary sheet for update)  Subjective functional status/changes:   [] No changes reported  \"I am having some increased soreness in the base of my skull. Yesterday was terrible.  \"    OBJECTIVE    Modality rationale: decrease pain and increase tissue extensibility to improve the patients ability to tolerate ADLs and activities   Min Type Additional Details    [] Estim:  []Unatt       []IFC  []Premod                        []Other:  []w/ice   []w/heat  Position:  Location:    [] Estim: []Att    []TENS instruct  []NMES                    []Other:  []w/US   []w/ice   []w/heat  Position:  Location:    []  Traction: [] Cervical       []Lumbar                       [] Prone          []Supine                       []Intermittent   []Continuous Lbs:  [] before manual  [] after manual    []  Ultrasound: []Continuous   [] Pulsed                           []1MHz   []3MHz W/cm2:  Location:    []  Iontophoresis with dexamethasone         Location: [] Take home patch   [] In clinic   10 []  Ice     [x]  Heat  post  []  Ice massage  []  Laser   []  Anodyne Position:supine  Location:Csp    []  Laser with stim  []  Other:  Position:  Location:    []  Vasopneumatic Device Pressure:       [] lo [] med [] hi   Temperature: [] lo [] med [] hi   [] Skin assessment post-treatment:  []intact []redness- no adverse reaction    []redness  adverse reaction:          15 min Therapeutic Exercise:  [x] See flow sheet :   Rationale: increase ROM, increase strength and improve coordination to improve the patients ability to tolerate ADLs and activities    18 min Therapeutic Activity:  [x]  See flow sheet :   Rationale: increase ROM, increase strength and improve coordination  to improve the patients ability to tolerate ADLs and activities        13 min Manual Therapy:  CTX, SOR, Csp ROM. B UT, grade 2 Csp mobs all in supine,    Rationale: decrease pain, increase ROM and increase tissue extensibility to tolerate ADLs and activiites        With   [x] TE   [x] TA   [] neuro   [] other: Patient Education: [x] Review HEP    [] Progressed/Changed HEP based on:   [] positioning   [] body mechanics   [] transfers   [] heat/ice application    [x] other:attempt self management X 1 week and F/U with PT      Other Objective/Functional Measures: VC exercises and tech. Pain Level (0-10 scale) post treatment: 2    ASSESSMENT/Changes in Function: increased Sub occipital pain and soreness today, possibly weather related. Patient to attempt self management and follow up with us in 1 week. He is understanding that he may always have pain due to his arthritis yet he has had improvements with PT    Patient will continue to benefit from skilled PT services to modify and progress therapeutic interventions, address ROM deficits, address strength deficits, analyze and address soft tissue restrictions, analyze and cue movement patterns, analyze and modify body mechanics/ergonomics, assess and modify postural abnormalities and instruct in home and community integration to attain remaining goals.      [x]  See Plan of Care  [x]  See progress note/recertification  []  See Discharge Summary         Progress towards goals / Updated goals:  Goals for this certification period to be accomplished in 8treatments:     1 patient will have pain 1/10 to aid with increase tolerance to sleep            PN  2 9/23   CURRENT 2 9/25              2 patient will report overall 50% improvement to aid with increase tolerance to sleep and his activities volunteering              PN  no % but notes improvement  With decrease right arm pain 1 out of 7 on FOTO  And reports of residual numbness right arm 9/23   CURRENT NA 9/25  3. Patient will have FOTO 75 to show improved function with household and community activities               PN 68   CURRENT ongoing 9/25                 PLAN  [x]  Upgrade activities as tolerated     [x]  Continue plan of care  []  Update interventions per flow sheet       []  Discharge due to:_  [x]  Other:_PATIENT WILL ATTEMPT SELF MANAGEMENT AND FOLLOW UP NEXT Friday WITH APPOINTMENT OR PHONE CALL.      Franco Flores, PT 9/25/2020  10:11 AM    Future Appointments   Date Time Provider Sander Tabor   10/14/2020  2:00 PM Bill Alatorre MD [facility] BS AMB   10/19/2020  9:15 AM Jodi Xei MD [facility] BS AMB   11/30/2020  8:40 AM Bill Alatorre MD [facility] BS AMB   2/8/2021 10:30 AM Desire Aldana MD [facility]

## 2020-10-05 ENCOUNTER — HOSPITAL ENCOUNTER (OUTPATIENT)
Dept: PHYSICAL THERAPY | Age: 75
Discharge: HOME | End: 2020-10-05
Payer: MEDICARE

## 2020-10-05 PROCEDURE — 97530 THERAPEUTIC ACTIVITIES: CPT

## 2020-10-05 PROCEDURE — 97110 THERAPEUTIC EXERCISES: CPT

## 2020-10-05 PROCEDURE — 97140 MANUAL THERAPY 1/> REGIONS: CPT

## 2020-10-05 NOTE — PROGRESS NOTES
PT DAILY TREATMENT NOTE 10-18    Patient Name: Karis Garcia  Date:10/5/2020  : 1945  [x]  Patient  Verified  Payor: [place] MEDICARE / Plan: [place] MEDICARE PART A & B / Product Type: Medicare /    In time:1120  Out time:1211  Total Treatment Time (min): 51  Visit #: 2 of 8    Medicare/BCBS Only   Total Timed Codes (min):  41 1:1 Treatment Time:  41       Treatment Area: Cervicalgia [M54.2]    SUBJECTIVE  Pain Level (0-10 scale): 1  Any medication changes, allergies to medications, adverse drug reactions, diagnosis change, or new procedure performed?: [x] No    [] Yes (see summary sheet for update)  Subjective functional status/changes:   [] No changes reported  \"I did a lot of heavy work unloading a ton of pellets- also I have been doing some work on my pellet stove. \"    OBJECTIVE    Modality rationale: decrease pain and increase tissue extensibility to improve the patients ability to tolerate ADLS and activities   Min Type Additional Details    [] Estim:  []Unatt       []IFC  []Premod                        []Other:  []w/ice   []w/heat  Position:  Location:    [] Estim: []Att    []TENS instruct  []NMES                    []Other:  []w/US   []w/ice   []w/heat  Position:  Location:    []  Traction: [] Cervical       []Lumbar                       [] Prone          []Supine                       []Intermittent   []Continuous Lbs:  [] before manual  [] after manual    []  Ultrasound: []Continuous   [] Pulsed                           []1MHz   []3MHz W/cm2:  Location:    []  Iontophoresis with dexamethasone         Location: [] Take home patch   [] In clinic   10 []  Ice     [x]  Heat post   []  Ice massage  []  Laser   []  Anodyne Position:supine  Location:Csp    []  Laser with stim  []  Other:  Position:  Location:    []  Vasopneumatic Device Pressure:       [] lo [] med [] hi   Temperature: [] lo [] med [] hi   [] Skin assessment post-treatment:  []intact []redness- no adverse reaction    []redness  adverse reaction:          15 min Therapeutic Exercise:  [x] See flow sheet :   Rationale: increase ROM, increase strength and improve coordination to improve the patients ability to tolerate ADLS and activities    11 min Therapeutic Activity:  [x]  See flow sheet :   Rationale: increase ROM, increase strength and improve coordination  to improve the patients ability to tolerate ADLS and activities         15 min Manual Therapy:  CTX, SOR, Csp ROM, UT stretch B. In supine   Rationale: decrease pain, increase ROM, increase tissue extensibility and decrease trigger points to tolerate ADLs and activities         With   [x] TE   [x] TA   [] neuro   [] other: Patient Education: [x] Review HEP    [] Progressed/Changed HEP based on:   [] positioning   [] body mechanics   [] transfers   [] heat/ice application    [] other:      Other Objective/Functional Measures: VC exercises and tech     Pain Level (0-10 scale) post treatment: 1    ASSESSMENT/Changes in Function: residual Csp  LOM and Soft tissue congestion sub occipital region. Reports increase tolerance to heavier activities at home. Patient will continue to benefit from skilled PT services to modify and progress therapeutic interventions, address ROM deficits, address strength deficits, analyze and address soft tissue restrictions, analyze and cue movement patterns, analyze and modify body mechanics/ergonomics, assess and modify postural abnormalities and instruct in home and community integration to attain remaining goals.      [x]  See Plan of Care  [x]  See progress note/recertification  []  See Discharge Summary         Progress towards goals / Updated goals:  Goals for this certification period to be accomplished in 8treatments:     1 patient will have pain 1/10 to aid with increase tolerance to sleep            PN  2 9/23              CURRENT 1 10/5              2 patient will report overall 50% improvement to aid with increase tolerance to sleep and his activities volunteering              PN  no % but notes improvement  With decrease right arm pain 1 out of 7 on FOTO  And reports of residual numbness right arm 9/23              CURRENT NA 10/5  3.  Patient will have FOTO 75 to show improved function with household and community activities               MT 34              CURRENT ongoing 10/5               PLAN  [x]  Upgrade activities as tolerated     [x]  Continue plan of care  []  Update interventions per flow sheet       []  Discharge due to:_  []  Other:_      Erik Berg, PT 10/5/2020  11:39 AM    Future Appointments   Date Time Provider Sander Tabor   10/8/2020  1:30 PM Roxanna Don PTA [facility]   10/14/2020  2:00 PM Bart Guzman MD [facility] BS AMB   10/19/2020  9:15 AM Roxie Rodriguez MD [facility] BS AMB   11/30/2020  8:40 AM Bart Guzman MD [facility] BS AMB   2/8/2021 10:30 AM Reza Rodriguez MD [facility] Patient

## 2020-10-08 ENCOUNTER — HOSPITAL ENCOUNTER (OUTPATIENT)
Dept: PHYSICAL THERAPY | Age: 75
Discharge: HOME | End: 2020-10-08
Payer: MEDICARE

## 2020-10-08 PROCEDURE — 97110 THERAPEUTIC EXERCISES: CPT

## 2020-10-08 PROCEDURE — 97530 THERAPEUTIC ACTIVITIES: CPT

## 2020-10-08 PROCEDURE — 97140 MANUAL THERAPY 1/> REGIONS: CPT

## 2020-10-08 NOTE — PROGRESS NOTES
PT DAILY TREATMENT NOTE 10-18    Patient Name: Brown Rubio  Date:10/8/2020  : 1945  [x]  Patient  Verified  Payor: Fabiano Laws / Plan: [place] MEDICARE PART A & B / Product Type: Medicare /    In time: 1:34 Out time:2:16  Total Treatment Time (min): 40  Visit #: 3 of 8    Medicare/BCBS Only   Total Timed Codes (min):  40 1:1 Treatment Time:  40       Treatment Area: Cervicalgia [M54.2]    SUBJECTIVE  Pain Level (0-10 scale): 1  Any medication changes, allergies to medications, adverse drug reactions, diagnosis change, or new procedure performed?: [x] No    [] Yes (see summary sheet for update)  Subjective functional status/changes:   [] No changes reported  \"Little pain. \"    OBJECTIVE    Modality rationale: decrease edema, decrease inflammation, decrease pain and increase tissue extensibility to improve the patients ability to perform ADL    Min Type Additional Details    [] Estim:  []Unatt       []IFC  []Premod                        []Other:  []w/ice   []w/heat  Position:  Location:    [] Estim: []Att    []TENS instruct  []NMES                    []Other:  []w/US   []w/ice   []w/heat  Position:  Location:    []  Traction: [] Cervical       []Lumbar                       [] Prone          []Supine                       []Intermittent   []Continuous Lbs:  [] before manual  [] after manual    []  Ultrasound: []Continuous   [] Pulsed                           []1MHz   []3MHz W/cm2:  Location:    []  Iontophoresis with dexamethasone         Location: [] Take home patch   [] In clinic    []  Ice     []  heat  []  Ice massage  []  Laser   []  Anodyne Position:  Location:    []  Laser with stim  []  Other:  Position:  Location:    []  Vasopneumatic Device Pressure:       [] lo [] med [] hi   Temperature: [] lo [] med [] hi   [x] Skin assessment post-treatment:  [x]intact []redness- no adverse reaction    []redness - adverse reaction:      min []Eval                  []Re-Eval       15 min Therapeutic Exercise:  [x] See flow sheet :   Rationale: increase ROM and increase strength to improve the patients ability to perform ADL     15 min Therapeutic Activity:  []  See flow sheet :   Rationale: increase ROM and increase strength  to improve the patients ability to perform ADL       min Neuromuscular Re-education:  []  See flow sheet :   Rationale: increase ROM, increase strength, improve coordination, improve balance and increase proprioception  to improve the patients ability to perform ADL     10 min Manual Therapy:  CSP TX supine   Rationale: decrease pain, increase ROM, increase tissue extensibility and decrease edema  to perform ADL      min Gait Training:  ___ feet with ___ device on level surfaces with ___ level of assist   Rationale: With   [x] TE   [] TA   [] neuro   [] other: Patient Education: [x] Review HEP    [] Progressed/Changed HEP based on:   [] positioning   [] body mechanics   [] transfers   [] heat/ice application    [] other:      Other Objective/Functional Measures:      Pain Level (0-10 scale) post treatment: 0    ASSESSMENT/Changes in Function: Pt completed each there ex fairly well. Pt benefited with treatment today. Patient will continue to benefit from skilled PT services to address functional mobility deficits, address ROM deficits, address strength deficits, analyze and address soft tissue restrictions, analyze and cue movement patterns, analyze and modify body mechanics/ergonomics and assess and modify postural abnormalities to attain remaining goals.      []  See Plan of Care  []  See progress note/recertification  []  See Discharge Summary         Progress towards goals / Updated goals:   1 patient will have pain 1/10 to aid with increase tolerance to sleep            PN  2 9/23              CURRENT 1 10/5              2 patient will report overall 50% improvement to aid with increase tolerance to sleep and his activities volunteering              PN  no % but notes improvement  With decrease right arm pain 1 out of 7 on FOTO  And reports of residual numbness right arm 9/23              CURRENT NA 10/5  3.  Patient will have FOTO 75 to show improved function with household and community activities               PN 68              CURRENT ongoing 10/5               PLAN  [x]  Upgrade activities as tolerated     []  Continue plan of care  []  Update interventions per flow sheet       []  Discharge due to:_  []  Other:_      Apple Birch, PTA 10/8/2020  1:35 PM    Future Appointments   Date Time Provider Sander Tabor   10/14/2020  2:00 PM Katerina Bolton MD [facility] BS AMB   10/19/2020  9:15 AM Jai Yoon MD [facility] BS AMB   11/30/2020  8:40 AM Katerina Bolton MD [facility] AMB   2/8/2021 10:30 AM Ramiro Chu MD [facility]

## 2020-10-14 ENCOUNTER — OFFICE VISIT (OUTPATIENT)
Dept: NEUROLOGY | Age: 75
End: 2020-10-14
Payer: MEDICARE

## 2020-10-14 VITALS
DIASTOLIC BLOOD PRESSURE: 60 MMHG | BODY MASS INDEX: 22.43 KG/M2 | HEART RATE: 72 BPM | RESPIRATION RATE: 18 BRPM | SYSTOLIC BLOOD PRESSURE: 110 MMHG | TEMPERATURE: 96.2 F | OXYGEN SATURATION: 96 % | WEIGHT: 148 LBS | HEIGHT: 68 IN

## 2020-10-14 DIAGNOSIS — G56.03 BILATERAL CARPAL TUNNEL SYNDROME: Primary | ICD-10-CM

## 2020-10-14 PROCEDURE — 95861 NEEDLE EMG 2 EXTREMITIES: CPT | Performed by: STUDENT IN AN ORGANIZED HEALTH CARE EDUCATION/TRAINING PROGRAM

## 2020-10-14 PROCEDURE — 95909 NRV CNDJ TST 5-6 STUDIES: CPT | Performed by: STUDENT IN AN ORGANIZED HEALTH CARE EDUCATION/TRAINING PROGRAM

## 2020-10-14 PROCEDURE — 95885 MUSC TST DONE W/NERV TST LIM: CPT | Performed by: STUDENT IN AN ORGANIZED HEALTH CARE EDUCATION/TRAINING PROGRAM

## 2020-10-14 RX ORDER — FLUTICASONE PROPIONATE 50 MCG
2 SPRAY, SUSPENSION (ML) NASAL DAILY
COMMUNITY

## 2020-10-14 NOTE — PROGRESS NOTES
[facility]  [address] [phone]    Neurophysiology Report    Patient: Tamika Howard     ID:  Physician: Carmen Bowers MD   Gender: Male Ref Phys: Lesly Butterfield   Handedness:      Study Date: October 14, 2020         Patient History:  A 70years old male patient with medical history of prediabetes, hypertension, and BPH here for evaluation of bilateral numbness and tingling of his hands o He has some difficulty holding mainly from arthritis in his handsf more than 6 months duration. Symptoms wake him up from sleep. He has mild neck pain over the lower part which sometimes goes down to the shoulder.   On exam: Motor: no pronator drift, tone normal throughout, strength 5/5 throughout  Sensory: intact to light touch and PP  throughout      Nerve Conduction Studies  Anti Sensory Left/Right Comparison     Stim Site NR L Lat (ms) R Lat (ms) Norm Peak (ms) L Amp (µV) R Amp (µV) Norm O-P Amp Site1 Site2 L Saud (m/s) R Saud (m/s) Norm Saud (m/s)   Median Anti Sensory (2nd Digit)   Wrist     5.1 <3.6  6.0 >20 Wrist 2nd Digit  27.5 >50.0   Median 2nd Digit Anti Sensory (2nd Digit)   Wrist    3.8  <3.5 8.3  >20 Wrist 2nd Digit 34.2  >50   Ulnar Anti Sensory (5th Digit )   Wrist    2.7 2.7 <3.1 18.7 9.5 >17.0 Wrist 5th Digit  40.7 40.7 >50     Motor Left/Right Comparison     Stim Site L Lat (ms) R Lat (ms) Norm Onset (ms) L Amp (mV) R Amp (mV) Norm O-P Amp Site1 Site2 L Saud (m/s) R Saud (m/s) Norm Saud (m/s)   Median Motor (Abd Poll Brev)   Wrist 5.8 6.9 <4.4 6.0 5.4 >4.0        Ulnar Motor (Abd Dig Minimi )   Wrist 3.0 3.0 <3.3 6.7 8.2 >6.0            EMG     Side Muscle Nerve Root Ins Act Fibs Psw Fasc Amp Dur Poly Recrt Int Sanjuanita Solid Comment   Left Abd Poll Brev Median C8-T1 Nml Nml Nml None Nml Nml 0 Nml Nml    Left 1stDorInt Ulnar C8-T1 Nml Nml Nml None Nml Nml 0 Nml Nml    Left FlexCarRad Median C6-7 Nml Nml Nml None Nml Nml 0 Nml Nml    Left Biceps Musculocut C5-6 Nml Nml Nml None Nml Nml 0 Nml Nml        NCS/EMG FINDINGS:  1. The right median sensory from second digit showed prolonged distal latency, decreased amplitude and conduction velocity  2. The left median sensory from the second digit showed normal peak latency but decreased amplitude and conduction velocity  3. The right ulnar sensory from the 5th digit showed normal peak latency but decreased amplitude and conduction velocity. 4. The left ulnar from the 5th digit showed normal latency and amplitude  5. The right median motor showed prolonged distal latency but normal amplitude  6. The left median motor showed prolonged distal latency but normal amplitude  7. The left and right ulnar motor studies are normal.   8. The needle exam from the sampled left APB, FDI, FCR, and Biceps are normal.          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 ___________________________  Ovidio Jimenez MD      Waveforms:                                   Chart reviewed for the following:               Shahab Carvalho MD, have reviewed the History, Physical and updated the Allergy history of  patient  Cale Hameed performed immediately prior to start of procedure:     I  have performed the following reviews on prior to the start of the procedure:     * Patient was identified by name and date of birth   * Agreement on procedure being performed was verified  * Risks and Benefits explained to the patient  * Procedure site verified and marked as necessary  * Patient was positioned for comfort  * Consent was signed and verified     Time: 3:30 PM     Date of procedure: 10/14/2020     Procedure performed by:  [facility]     Provider assisted by: none     Patient assisted by: self     How tolerated by patient: tolerated the procedure well. Post Procedural Pain Scale:  No marked  pain.

## 2020-10-14 NOTE — PROGRESS NOTES
Bronwyn Byrd presents today for   Chief Complaint   Patient presents with    Procedure     EMG       Is someone accompanying this pt? no    Is the patient using any DME equipment during OV? no    Depression Screening:  3 most recent PHQ Screens 7/1/2020   Little interest or pleasure in doing things Not at all   Feeling down, depressed, irritable, or hopeless Not at all   Total Score PHQ 2 0       Learning Assessment:  Learning Assessment 10/1/2018   PRIMARY LEARNER Patient   HIGHEST LEVEL OF EDUCATION - PRIMARY LEARNER  4 YEARS [facility] PRIMARY LEARNER NONE   CO-LEARNER CAREGIVER No   PRIMARY LANGUAGE ENGLISH    NEED No   LEARNER PREFERENCE PRIMARY DEMONSTRATION   LEARNING SPECIAL TOPICS No   ANSWERED BY patient   RELATIONSHIP SELF       Abuse Screening:  Abuse Screening Questionnaire 7/1/2020   Do you ever feel afraid of your partner? N   Are you in a relationship with someone who physically or mentally threatens you? N   Is it safe for you to go home? Y       Fall Risk  Fall Risk Assessment, last 12 mths 7/1/2020   Able to walk? Yes   Fall in past 12 months? No         Coordination of Care:  1. Have you been to the ER, urgent care clinic since your last visit? Hospitalized since your last visit? no    2. Have you seen or consulted any other health care providers outside of the [address] [place] [address] since your last visit? Include any pap smears or colon screening.  no

## 2020-10-19 ENCOUNTER — VIRTUAL VISIT (OUTPATIENT)
Dept: FAMILY MEDICINE CLINIC | Age: 75
End: 2020-10-19
Payer: MEDICARE

## 2020-10-19 DIAGNOSIS — M79.641 PAIN OF RIGHT HAND: ICD-10-CM

## 2020-10-19 DIAGNOSIS — M54.2 NECK PAIN: Primary | ICD-10-CM

## 2020-10-19 DIAGNOSIS — M79.601 PAIN OF RIGHT UPPER EXTREMITY: ICD-10-CM

## 2020-10-19 PROCEDURE — 99442 PR PHYS/QHP TELEPHONE EVALUATION 11-20 MIN: CPT | Performed by: FAMILY MEDICINE

## 2020-10-19 NOTE — PROGRESS NOTES
Karis Garcia is a 70 y.o. male, evaluated via audio-only technology on 10/19/2020 for Follow-up  . Assessment & Plan:   Diagnoses and all orders for this visit:    Neck pain: Last visit sent to physical therapy. He has done 5 weeks of therapy. Has been helping. Still has on and off discomfort which is 1-2/10 in intensity. Wanted to stop the therapy has significant improvement. Understood and advised the patient to stop it. Home exercise. As needed Tylenol or ibuprofen with food. Pain of right upper extremity: Can be ready colopathy from the cervical spine and a combination of carpal tunnel syndrome which he has in bilateral hand. Right more than left. Has seen the neurology. Now pending EMG. Will follow the recommendations. Meantime advised the bilateral wrist support. Pain of right hand: See above    Today he has been having a right side upper extremity pain as he has painted his patio yesterday. I have advised the symptomatic treatment and observe. Could be from physical exertion to the affected area. Patient understood and agree with the plan  Has an eye exam coming up this month. Discussed that shingles vaccine needs to be taken from the pharmacy      12  Subjective:   Done phone check for follow-up on her neck pain and right upper extremity pain. He was sent to the physical therapy and currently improvement in symptoms significantly. Still has on and off 1-2/10 intensity pain. Currently worsening of the right upper extremity pain due to painted the patio yesterday. Has been wearing a wrist support. More pain below the right elbow. Has a pending EMG. Seen neurology. For now discussed the symptomatic treatment with Tylenol or ibuprofen as needed. Pain is mild in intensity. No weakness of extremity. No radiation. No cold cough wheezing or fever. No chest pain or shortness of breath. No nausea or vomiting. No abdominal pain.   No concern with the urinary or bowel complaints. Did not sound in any acute distress during the phone check  Neck pain has been much improved after physical therapy. No headache or dizziness. Prior to Admission medications    Medication Sig Start Date End Date Taking? Authorizing Provider   fluticasone propionate (Flonase Allergy Relief) 50 mcg/actuation nasal spray 2 Sprays by Both Nostrils route daily. Yes Provider, Historical   atorvastatin (LIPITOR) 20 mg tablet TAKE 1 TABLET NIGHTLY 9/16/20  Yes Armida Pimentel MD   finasteride (PROSCAR) 5 mg tablet TAKE 1 TABLET DAILY 9/7/20  Yes Armida Pimentel MD   alfuzosin SR (UROXATRAL) 10 mg SR tablet TAKE 1 TABLET DAILY AFTER DINNER 7/29/20  Yes Armida Pimentel MD   melatonin 3 mg tablet Take 6 mg by mouth nightly. Yes Provider, Historical   OTHER L-thiamine 25 mg   Yes Provider, Historical   citalopram (CELEXA) 10 mg tablet Take 1 Tab by mouth daily. 7/1/20  Yes Armida Pimentel MD   omeprazole (PRILOSEC) 20 mg capsule TAKE 1 CAPSULE DAILY  Patient taking differently: two (2) times a day. 6/24/20  Yes Armida Pimentel MD   lisinopriL (PRINIVIL, ZESTRIL) 10 mg tablet TAKE 1 TABLET DAILY 6/24/20  Yes Armida Pimentel MD   allergy injection    Yes Provider, Historical   sildenafiL, pulm. hypertension, (REVATIO) 20 mg tablet Take 1 Tab by mouth as needed for Other (ED). Take up to 5 tablets as needed. Do not exceed 5 tablets. Take on an empty stomach. 3/10/20   Jessica Kwan MD   ibuprofen (MOTRIN IB) 200 mg tablet Take 400 mg by mouth every six (6) hours as needed for Pain. Param Cisse MD   acetaminophen (TYLENOL) 500 mg tablet Take  by mouth every six (6) hours as needed for Pain.     Provider, Historical     Patient Active Problem List    Diagnosis Date Noted    History of colon polyps 02/21/2019    Hearing problem of both ears 12/03/2018    Arthritis 12/03/2018    Hyperlipidemia 12/03/2018    Essential hypertension 12/03/2018    Groin pain 08/15/2014    Urinary frequency 08/15/2014    Hernia 08/15/2014    Left lower quadrant pain 08/15/2014    Groin fluid collection 08/15/2014    Benign prostatic hyperplasia 08/04/2014    Kidney stone 08/04/2014     Current Outpatient Medications   Medication Sig Dispense Refill    fluticasone propionate (Flonase Allergy Relief) 50 mcg/actuation nasal spray 2 Sprays by Both Nostrils route daily.  atorvastatin (LIPITOR) 20 mg tablet TAKE 1 TABLET NIGHTLY 90 Tab 1    finasteride (PROSCAR) 5 mg tablet TAKE 1 TABLET DAILY 90 Tab 1    alfuzosin SR (UROXATRAL) 10 mg SR tablet TAKE 1 TABLET DAILY AFTER DINNER 90 Tab 3    melatonin 3 mg tablet Take 6 mg by mouth nightly.  OTHER L-thiamine 25 mg      citalopram (CELEXA) 10 mg tablet Take 1 Tab by mouth daily. 30 Tab 1    omeprazole (PRILOSEC) 20 mg capsule TAKE 1 CAPSULE DAILY (Patient taking differently: two (2) times a day.) 90 Cap 3    lisinopriL (PRINIVIL, ZESTRIL) 10 mg tablet TAKE 1 TABLET DAILY 90 Tab 3    allergy injection       sildenafiL, pulm. hypertension, (REVATIO) 20 mg tablet Take 1 Tab by mouth as needed for Other (ED). Take up to 5 tablets as needed. Do not exceed 5 tablets. Take on an empty stomach. 90 Tab 3    ibuprofen (MOTRIN IB) 200 mg tablet Take 400 mg by mouth every six (6) hours as needed for Pain.  acetaminophen (TYLENOL) 500 mg tablet Take  by mouth every six (6) hours as needed for Pain.        No Known Allergies  Past Medical History:   Diagnosis Date    Basal cell carcinoma     BPH (benign prostatic hyperplasia)     Cancer (HCC)     Basal Cell Carcinoma    Depression     Gastrointestinal disorder     Diverticulitis    GERD (gastroesophageal reflux disease)     History of colon polyps 2/21/2019    Hypertension     Inguinal hernia     left side    Other ill-defined conditions(799.89)     BPH    Psychiatric disorder     Aggression, Depression     Social History     Tobacco Use    Smoking status: Former Smoker    Smokeless tobacco: Never Used    Tobacco comment: quit 1971   Substance Use Topics    Alcohol use: Yes     Alcohol/week: 1.7 standard drinks     Types: 2 Cans of beer per week     Comment: wine 2-3 x per week with dinner       ROS: See HPI  Patient-Reported Vitals 8/25/2020   Patient-Reported Weight 153   Patient-Reported Height 5'8.5''   Patient-Reported Pulse 73   Patient-Reported Temperature 98   Patient-Reported SpO2 97   Patient-Reported Systolic  612   Patient-Reported Diastolic 62        Ronny Mayfield, who was evaluated through a patient-initiated, synchronous (real-time) audio only encounter, and/or her healthcare decision maker, is aware that it is a billable service, with coverage as determined by his insurance carrier. He provided verbal consent to proceed: Yes. He has not had a related appointment within my department in the past 7 days or scheduled within the next 24 hours.       Total Time: minutes: 11-20 minutes    Jayson Blizzard, MD

## 2020-10-21 ENCOUNTER — APPOINTMENT (OUTPATIENT)
Dept: PHYSICAL THERAPY | Age: 75
End: 2020-10-21
Payer: MEDICARE

## 2020-10-21 NOTE — PROGRESS NOTES
[facility]  [address] [facility], [address] [place] [address], [address] [place], [place] [address]y [address],[address]  [phone] fax      Discharge Summary    Patient name: Smita Nelson     Start of Care: 20  Referral source: Mohinder Acevedo MD    : 1945  Medical/Treatment Diagnosis: Cervicalgia [M54.2]  Payor: Ike Ritter / Plan: VA MEDICARE PART A & B / Product Type: Medicare /        Onset Date:8 months  Prior Hospitalization: see medical history   Provider#: [id number]  Comorbidities: depression, arthritis, HTN, hearing impaired, Cancer  basal cell carcinoma   Prior Level of Function:  able to tolerate all areas of ADLS and activities, exercised at the gym, retired, drives, household and community activities tolerated, 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 His residual pain was 1/10. He has exercises for his HEP and should follow up with his MD as needed. Thank you.    Heri Isbell will have pain 1/10 to aid with increase tolerance to sleep  Status at last note/certification:last MD Note  Status at discharge: met Heri Isbell will report overall 50% improvement   Status at last note/certification:last MD note  Status at discharge:  Ongoing, Not reassessed    Goal:Patient will have FOTO 75 to show improved function with household and community activities  Status at last note/certification:last MD note  Status at discharge:ongoing , not reassessed       ASSESSMENT/RECOMMENDATIONS:  []Discontinue therapy progressing towards or have reached established goals  []Discontinue therapy due to lack of appreciable progress towards goals  []Discontinue therapy due to lack of attendance or compliance  [x]Other:self discharge      Thank you for this referral.     Gregoria Crook, PT 10/21/2020 11:44 AM

## 2020-10-22 ENCOUNTER — APPOINTMENT (OUTPATIENT)
Dept: PHYSICAL THERAPY | Age: 75
End: 2020-10-22
Payer: MEDICARE

## 2020-10-28 ENCOUNTER — TELEPHONE (OUTPATIENT)
Dept: NEUROLOGY | Age: 75
End: 2020-10-28

## 2020-11-02 NOTE — TELEPHONE ENCOUNTER
Pt states he received his EMG results via [employer]. However, he reads that he has severe right hand neuropathy. Pt wants to know if Dr. Katty Padron can address his condition before his 12/7 f/u appt. Please advise.

## 2020-11-11 DIAGNOSIS — G56.03 BILATERAL CARPAL TUNNEL SYNDROME: Primary | ICD-10-CM

## 2020-11-23 ENCOUNTER — OFFICE VISIT (OUTPATIENT)
Dept: ORTHOPEDIC SURGERY | Age: 75
End: 2020-11-23
Payer: MEDICARE

## 2020-11-23 VITALS
HEIGHT: 68 IN | HEART RATE: 59 BPM | WEIGHT: 154 LBS | DIASTOLIC BLOOD PRESSURE: 72 MMHG | SYSTOLIC BLOOD PRESSURE: 138 MMHG | BODY MASS INDEX: 23.34 KG/M2 | TEMPERATURE: 96.9 F

## 2020-11-23 DIAGNOSIS — G56.01 SEVERE CARPAL TUNNEL SYNDROME, RIGHT: Primary | ICD-10-CM

## 2020-11-23 DIAGNOSIS — M65.311 TRIGGER THUMB OF RIGHT HAND: ICD-10-CM

## 2020-11-23 DIAGNOSIS — Z01.818 PREOP EXAMINATION: Primary | ICD-10-CM

## 2020-11-23 DIAGNOSIS — G56.02 LEFT CARPAL TUNNEL SYNDROME: ICD-10-CM

## 2020-11-23 PROCEDURE — 99204 OFFICE O/P NEW MOD 45 MIN: CPT | Performed by: ORTHOPAEDIC SURGERY

## 2020-11-23 PROCEDURE — G8427 DOCREV CUR MEDS BY ELIG CLIN: HCPCS | Performed by: ORTHOPAEDIC SURGERY

## 2020-11-23 PROCEDURE — 1101F PT FALLS ASSESS-DOCD LE1/YR: CPT | Performed by: ORTHOPAEDIC SURGERY

## 2020-11-23 PROCEDURE — 3017F COLORECTAL CA SCREEN DOC REV: CPT | Performed by: ORTHOPAEDIC SURGERY

## 2020-11-23 PROCEDURE — G8754 DIAS BP LESS 90: HCPCS | Performed by: ORTHOPAEDIC SURGERY

## 2020-11-23 PROCEDURE — G8752 SYS BP LESS 140: HCPCS | Performed by: ORTHOPAEDIC SURGERY

## 2020-11-23 PROCEDURE — G8420 CALC BMI NORM PARAMETERS: HCPCS | Performed by: ORTHOPAEDIC SURGERY

## 2020-11-23 PROCEDURE — G8536 NO DOC ELDER MAL SCRN: HCPCS | Performed by: ORTHOPAEDIC SURGERY

## 2020-11-23 PROCEDURE — G8432 DEP SCR NOT DOC, RNG: HCPCS | Performed by: ORTHOPAEDIC SURGERY

## 2020-11-23 PROCEDURE — 20526 THER INJECTION CARP TUNNEL: CPT | Performed by: ORTHOPAEDIC SURGERY

## 2020-11-23 NOTE — PROGRESS NOTES
Darion Aguirre is a 70 y.o. male right handed retiree. Worker's Compensation and legal considerations: none filed. Vitals:    11/23/20 0856   BP: 138/72   Pulse: (!) 59   Temp: 96.9 °F (36.1 °C)   Weight: 154 lb (69.9 kg)   Height: 5' 8\" (1.727 m)   PainSc:   1   PainLoc: Hand           Chief Complaint   Patient presents with    Hand Pain     bilateral         HPI: Patient comes in today with complaints of bilateral hand numbness right much worse than left. He also has a history of bilateral trigger thumbs with the right side bothering him now for the left side not giving him any problems. He is recently had an EMG positive for bilateral carpal tunnel syndrome.     Date of onset: Chronic    Injury: No    Prior Treatment:  Yes: Comment: Nighttime braces    Numbness/ Tingling: Yes: Comment: Bilateral right worse than left      ROS: Review of Systems - General ROS: negative  Psychological ROS: negative  ENT ROS: negative  Allergy and Immunology ROS: negative  Hematological and Lymphatic ROS: negative  Respiratory ROS: no cough, shortness of breath, or wheezing  Cardiovascular ROS: no chest pain or dyspnea on exertion  Gastrointestinal ROS: no abdominal pain, change in bowel habits, or black or bloody stools  Musculoskeletal ROS: positive for - pain in hand - bilateral and thumb - right  Neurological ROS: positive for - numbness/tingling  Dermatological ROS: negative    Past Medical History:   Diagnosis Date    Basal cell carcinoma     BPH (benign prostatic hyperplasia)     Cancer (HCC)     Basal Cell Carcinoma    Depression     Gastrointestinal disorder     Diverticulitis    GERD (gastroesophageal reflux disease)     History of colon polyps 2/21/2019    Hypertension     Inguinal hernia     left side    Other ill-defined conditions(799.89)     BPH    Psychiatric disorder     Aggression, Depression       Past Surgical History:   Procedure Laterality Date    COLONOSCOPY N/A 2/22/2019    COLONOSCOPY, SURVEILLANCE performed by Glenda Arreaga MD at [address] [place] [address] HX COLONOSCOPY      HX HERNIA REPAIR  03/14/2017    HX MALIGNANT SKIN LESION EXCISION         Current Outpatient Medications   Medication Sig Dispense Refill    fluticasone propionate (Flonase Allergy Relief) 50 mcg/actuation nasal spray 2 Sprays by Both Nostrils route daily.  atorvastatin (LIPITOR) 20 mg tablet TAKE 1 TABLET NIGHTLY 90 Tab 1    finasteride (PROSCAR) 5 mg tablet TAKE 1 TABLET DAILY 90 Tab 1    alfuzosin SR (UROXATRAL) 10 mg SR tablet TAKE 1 TABLET DAILY AFTER DINNER 90 Tab 3    melatonin 3 mg tablet Take 6 mg by mouth nightly.  OTHER L-thiamine 25 mg      citalopram (CELEXA) 10 mg tablet Take 1 Tab by mouth daily. 30 Tab 1    omeprazole (PRILOSEC) 20 mg capsule TAKE 1 CAPSULE DAILY (Patient taking differently: two (2) times a day.) 90 Cap 3    lisinopriL (PRINIVIL, ZESTRIL) 10 mg tablet TAKE 1 TABLET DAILY 90 Tab 3    sildenafiL, pulm. hypertension, (REVATIO) 20 mg tablet Take 1 Tab by mouth as needed for Other (ED). Take up to 5 tablets as needed. Do not exceed 5 tablets. Take on an empty stomach. 90 Tab 3    allergy injection       ibuprofen (MOTRIN IB) 200 mg tablet Take 400 mg by mouth every six (6) hours as needed for Pain.  acetaminophen (TYLENOL) 500 mg tablet Take  by mouth every six (6) hours as needed for Pain. No Known Allergies        PE:     Physical Exam  Vitals signs and nursing note reviewed. Constitutional:       General: He is not in acute distress. Appearance: Normal appearance. He is not ill-appearing, toxic-appearing or diaphoretic. HENT:      Head: Normocephalic and atraumatic. Nose: Nose normal.      Mouth/Throat:      Mouth: Mucous membranes are moist.   Eyes:      Extraocular Movements: Extraocular movements intact. Pupils: Pupils are equal, round, and reactive to light. Neck:      Musculoskeletal: Normal range of motion and neck supple.    Cardiovascular: Pulses: Normal pulses. Pulmonary:      Effort: Pulmonary effort is normal. No respiratory distress. Abdominal:      General: Abdomen is flat. There is no distension. Musculoskeletal: Normal range of motion. General: Tenderness present. No swelling, deformity or signs of injury. Right lower leg: No edema. Left lower leg: No edema. Skin:     General: Skin is warm and dry. Capillary Refill: Capillary refill takes less than 2 seconds. Findings: No bruising or erythema. Neurological:      General: No focal deficit present. Mental Status: He is alert and oriented to person, place, and time. Cranial Nerves: No cranial nerve deficit. Sensory: No sensory deficit. Psychiatric:         Mood and Affect: Mood normal.         Behavior: Behavior normal.            Hand:    Examination L Digit(s) R Digit(s)   1st CMC Tenderness -  -    1st CMC Grind -  -    Kathrin Nodes -  -    Heberden Nodes -  -    A1 Pulley Tenderness -  + Th   Triggering -  -    UCL Instability -  -    RCL Instability -  -    Lateral Stress Pain -  -    Palmar Cords -  -    Tabletop test -  -    Garrod's Pads -  -     Strength       Pinch Strength         ROM: Full      NEUROVASCULAR    Examination L R Examination L R   Carpal Comp. + ++ Pronator Comp. - -   Carpal Tinel + ++ Pronator Tinel - -   Phalen's + ++ Pronator Stress - -   Cubital Comp. - - Guyon Comp. - -   Cubital Tinel - - Guyon Tinel - -   Elbow Hyperflexion - - Adson's - -   Spurling's - - SC Comp. - -   PCB Median abn - - SC Tinel - -   Radial Tinel - - IC Comp. - -   Digital Tinel - - IC Tinel - -   Radial 2-Pt WNL WNL Ulnar 2-Pt WNL WNL     Radial Pulse: 2+  Capillary Refill: < 2 sec  Nick: Not Performed  Digital Nick: Not Performed        NCS/EMG FINDINGS:  1. The right median sensory from second digit showed prolonged distal latency, decreased amplitude and conduction velocity  2.  The left median sensory from the second digit showed normal peak latency but decreased amplitude and conduction velocity  3. The right ulnar sensory from the 5th digit showed normal peak latency but decreased amplitude and conduction velocity. 4. The left ulnar from the 5th digit showed normal latency and amplitude  5. The right median motor showed prolonged distal latency but normal amplitude  6. The left median motor showed prolonged distal latency but normal amplitude  7. The left and right ulnar motor studies are normal.   8. The needle exam from the sampled left APB, FDI, FCR, and Biceps are normal.       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 Imaging:     None indicated        ICD-10-CM ICD-9-CM    1. Severe carpal tunnel syndrome, right  G56.01 354.0 SCHEDULE SURGERY      CANCELED: SCHEDULE SURGERY   2. Trigger thumb of right hand  M65.311 727.03 SCHEDULE SURGERY   3. Left carpal tunnel syndrome  G56.02 354.0 triamcinolone acetonide (KENALOG) 10 mg/mL injection 5 mg      INJECT CARPAL TUNNEL         Plan:     Schedule right carpal tunnel release and right thumb A1 pulley release. Left carpal tunnel injection. This procedure has been fully reviewed with the patient and written informed consent has been obtained. The patient was counseled at length about the risks of harlan Covid-19 during their perioperative period and any recovery window from their procedure. The patient was made aware that harlan Covid-19  may worsen their prognosis for recovering from their procedure and lend to a higher morbidity and/or mortality risk. All material risks, benefits, and reasonable alternatives including postponing the procedure were discussed. The patient does  wish to proceed with the procedure at this time. Follow-up and Dispositions    · Return for postop wound check.           Plan was reviewed with patient, who verbalized agreement and understanding of the plan    [address] [facility]  OFFICE PROCEDURE PROGRESS NOTE        Chart reviewed for the following:   Cesario SHEPHERD DO, have reviewed the History, Physical and updated the Allergic reactions for [address] performed immediately prior to start of procedure:   Cesario SHEPHERD DO, have performed the following reviews on Marc Leone prior to the start of the procedure:            * Patient was identified by name and date of birth   * Agreement on procedure being performed was verified  * Risks and Benefits explained to the patient  * Procedure site verified and marked as necessary  * Patient was positioned for comfort  * Consent was signed and verified     Time: 09:30 AM      Date of procedure: 11/23/2020    Procedure performed by: Germania Laura DO    Provider assisted by: Katlny Mcdaniel LPN    Patient assisted by: self    How tolerated by patient: tolerated the procedure well with no complications    Post Procedural Pain Scale: 0 - No Hurt    Comments: none    Procedure:  After consent was obtained, using sterile technique the tendon was prepped. Local anesthetic used: 1% lidocaine. Kenalog 5 mg and was then injected and the needle withdrawn. The procedure was well tolerated. The patient is asked to continue to rest the area for a few more days before resuming regular activities. It may be more painful for the first 1-2 days. Watch for fever, or increased swelling or persistent pain in the joint. Call or return to clinic prn if such symptoms occur or there is failure to improve as anticipated.

## 2020-11-23 NOTE — LETTER
[address] [facility] Surgery [facility] ANGELINA AUGUSTINE MORA Fax to [phone]  Telephone: [phone] or [phone] To be completed by Physician Office: 
 
Date: 2020    Requested by: Mir Martin Phone No: [phone]  Fax No:  [phone] Surgery Date: 12/10/2020   Requested Time: Fifth Case Surgeon: Prachi Gonzalez DO. Assistant/2nd Surgeon: CPT CODE  Procedure 08230 Right Carpal Tunnel Release [id number] Right Thumb A1 Pulley Release Diagnosis:  Right Carpal Tunnel Syndrome G56.01 Trigger thumb of right hand  M65.311 Patient Information: 
 
Name: Randy Carney SSN: [id number]  : 1945   Male/Female: male Home Phone No: [phone] (home) Primary Insurance: Medicare  Insurance Policy Number:  [id number] Allergies: No Known Allergies Admission: outpatient Anesthesia Type:  MAC, Local 
 
Comments/Special Equipment and/or : 
 
Hand Table, Lead Hand

## 2020-11-23 NOTE — LETTER
Patient: Siria Martin PROCEDURE: Right Carpal Tunnel Release, Right Thumb A1 Pulley Release PRE OPERATIVE INSTRUCTIONS: 
Five (5) days prior to surgery STOP taking any hormonal medications, aspirin and/or anti-inflammatory medications. If you are taking blood thinner medication (such as Coumadin, Plavix, Heparin or others) you will need special instructions from the prescribing physician. LABS: Report to [facility] on 12/4/2020 between 1:00pm-3:45pm for bloodwork, EKG & COVID testing.  
 
o It doesn't matter if you have eaten before going to the Lab. o If required labs and EKG are not completed Surgery will be canceled. Surgery Date: 12/10/2020  Time: 3:30pm 
Report to Alec Soto on the First Floor Admitting at: 2:00pm 
 
THE DAY OF SURGERY:  
      1. Do not eat, chew gum or drink anything after Midnight prior to the date of your surgery. 2. Take your regular medications with small sips of water unless otherwise instructed. (This means blood pressure and/or heart medicine) If you are insulin dependent, bring your insulin with you, unless otherwise instructed. 3. Bring a list of your medications and the dosage to the hospital including  vitamins. 4. Do not wear nail polish, make-up, jewelry, perfumes or skin creams. 5. Do not bring valuables or money to the hospital. 
      6. You MUST have a responsible adult accompany you, stay during your surgery and drive you home following surgery. It is recommended that they stay with you 24 hours after surgery. Post op visit  appointment is scheduled with Dr. Watson Halsted   
   on 12/21/2020 @ 8:30am at the [facility] office. Yong Waters, Surgery Scheduler  [phone]

## 2020-12-04 ENCOUNTER — HOSPITAL ENCOUNTER (OUTPATIENT)
Dept: LAB | Age: 75
Discharge: HOME | End: 2020-12-04
Payer: MEDICARE

## 2020-12-04 DIAGNOSIS — Z01.818 PREOP EXAMINATION: ICD-10-CM

## 2020-12-04 LAB
ALBUMIN SERPL-MCNC: 3.8 G/DL (ref 3.4–5)
ALBUMIN/GLOB SERPL: 1.4 {RATIO} (ref 0.8–1.7)
ALP SERPL-CCNC: 84 U/L (ref 45–117)
ALT SERPL-CCNC: 15 U/L (ref 16–61)
ANION GAP SERPL CALC-SCNC: 5 MMOL/L (ref 3–18)
AST SERPL-CCNC: 14 U/L (ref 10–38)
ATRIAL RATE: 66 BPM
BASOPHILS # BLD: 0 K/UL (ref 0–0.1)
BASOPHILS NFR BLD: 0 % (ref 0–2)
BILIRUB SERPL-MCNC: 0.7 MG/DL (ref 0.2–1)
BUN SERPL-MCNC: 18 MG/DL (ref 7–18)
BUN/CREAT SERPL: 16 (ref 12–20)
CALCIUM SERPL-MCNC: 9.2 MG/DL (ref 8.5–10.1)
CALCULATED R AXIS, ECG10: 42 DEGREES
CALCULATED T AXIS, ECG11: 60 DEGREES
CHLORIDE SERPL-SCNC: 109 MMOL/L (ref 100–111)
CO2 SERPL-SCNC: 26 MMOL/L (ref 21–32)
CREAT SERPL-MCNC: 1.13 MG/DL (ref 0.6–1.3)
DIAGNOSIS, 93000: NORMAL
DIFFERENTIAL METHOD BLD: ABNORMAL
EOSINOPHIL # BLD: 0 K/UL (ref 0–0.4)
EOSINOPHIL NFR BLD: 1 % (ref 0–5)
ERYTHROCYTE [DISTWIDTH] IN BLOOD BY AUTOMATED COUNT: 13.4 % (ref 11.6–14.5)
GLOBULIN SER CALC-MCNC: 2.7 G/DL (ref 2–4)
GLUCOSE SERPL-MCNC: 122 MG/DL (ref 74–99)
HCT VFR BLD AUTO: 40.8 % (ref 36–48)
HGB BLD-MCNC: 13.8 G/DL (ref 13–16)
LYMPHOCYTES # BLD: 1.5 K/UL (ref 0.9–3.6)
LYMPHOCYTES NFR BLD: 21 % (ref 21–52)
MCH RBC QN AUTO: 32.2 PG (ref 24–34)
MCHC RBC AUTO-ENTMCNC: 33.8 G/DL (ref 31–37)
MCV RBC AUTO: 95.3 FL (ref 74–97)
MONOCYTES # BLD: 0.4 K/UL (ref 0.05–1.2)
MONOCYTES NFR BLD: 6 % (ref 3–10)
NEUTS SEG # BLD: 5 K/UL (ref 1.8–8)
NEUTS SEG NFR BLD: 72 % (ref 40–73)
P-R INTERVAL, ECG05: 112 MS
PLATELET # BLD AUTO: 231 K/UL (ref 135–420)
PMV BLD AUTO: 11.6 FL (ref 9.2–11.8)
POTASSIUM SERPL-SCNC: 4.2 MMOL/L (ref 3.5–5.5)
PROT SERPL-MCNC: 6.5 G/DL (ref 6.4–8.2)
Q-T INTERVAL, ECG07: 406 MS
QRS DURATION, ECG06: 78 MS
QTC CALCULATION (BEZET), ECG08: 425 MS
RBC # BLD AUTO: 4.28 M/UL (ref 4.7–5.5)
SODIUM SERPL-SCNC: 140 MMOL/L (ref 136–145)
VENTRICULAR RATE, ECG03: 66 BPM
WBC # BLD AUTO: 7 K/UL (ref 4.6–13.2)

## 2020-12-04 PROCEDURE — 80053 COMPREHEN METABOLIC PANEL: CPT

## 2020-12-04 PROCEDURE — 85025 COMPLETE CBC W/AUTO DIFF WBC: CPT

## 2020-12-04 PROCEDURE — 36415 COLL VENOUS BLD VENIPUNCTURE: CPT

## 2020-12-04 PROCEDURE — 87635 SARS-COV-2 COVID-19 AMP PRB: CPT

## 2020-12-04 PROCEDURE — 93005 ELECTROCARDIOGRAM TRACING: CPT

## 2020-12-07 LAB — SARS-COV-2, COV2NT: NOT DETECTED

## 2020-12-10 DIAGNOSIS — Z98.890 S/P TRIGGER FINGER RELEASE: ICD-10-CM

## 2020-12-10 DIAGNOSIS — Z98.890 S/P CARPAL TUNNEL RELEASE: ICD-10-CM

## 2020-12-10 DIAGNOSIS — M65.311 TRIGGER THUMB OF RIGHT HAND: ICD-10-CM

## 2020-12-10 DIAGNOSIS — G56.01 SEVERE CARPAL TUNNEL SYNDROME, RIGHT: Primary | ICD-10-CM

## 2020-12-10 RX ORDER — HYDROCODONE BITARTRATE AND ACETAMINOPHEN 7.5; 325 MG/1; MG/1
1 TABLET ORAL
Qty: 12 TAB | Refills: 0 | Status: SHIPPED | OUTPATIENT
Start: 2020-12-10 | End: 2020-12-13

## 2020-12-21 ENCOUNTER — OFFICE VISIT (OUTPATIENT)
Dept: ORTHOPEDIC SURGERY | Age: 75
End: 2020-12-21
Payer: MEDICARE

## 2020-12-21 VITALS
WEIGHT: 154.8 LBS | TEMPERATURE: 97.8 F | HEIGHT: 68 IN | HEART RATE: 61 BPM | OXYGEN SATURATION: 99 % | SYSTOLIC BLOOD PRESSURE: 128 MMHG | RESPIRATION RATE: 16 BRPM | DIASTOLIC BLOOD PRESSURE: 72 MMHG | BODY MASS INDEX: 23.46 KG/M2

## 2020-12-21 DIAGNOSIS — G56.01 SEVERE CARPAL TUNNEL SYNDROME, RIGHT: Primary | ICD-10-CM

## 2020-12-21 DIAGNOSIS — M65.311 TRIGGER THUMB OF RIGHT HAND: ICD-10-CM

## 2020-12-21 DIAGNOSIS — Z98.890 S/P TRIGGER FINGER RELEASE: ICD-10-CM

## 2020-12-21 DIAGNOSIS — G56.02 LEFT CARPAL TUNNEL SYNDROME: ICD-10-CM

## 2020-12-21 DIAGNOSIS — Z98.890 S/P CARPAL TUNNEL RELEASE: ICD-10-CM

## 2020-12-21 PROCEDURE — 99024 POSTOP FOLLOW-UP VISIT: CPT | Performed by: ORTHOPAEDIC SURGERY

## 2020-12-21 NOTE — PROGRESS NOTES
Josi Hewitt is a 70 y.o. male right handed retiree. Worker's Compensation and legal considerations: none filed. Vitals:    12/21/20 0829   BP: 128/72   Pulse: 61   Resp: 16   Temp: 97.8 °F (36.6 °C)   TempSrc: Temporal   SpO2: 99%   Weight: 154 lb 12.8 oz (70.2 kg)   Height: 5' 8\" (1.727 m)   PainSc:   0 - No pain   PainLoc: Hand           Chief Complaint   Patient presents with    Hand Pain     right hand pain       HPI: Patient comes in today for first postoperative appointment approximately 2 weeks status post right carpal tunnel release and right trigger thumb release. He reports numbness in the thumb since surgery. He reports some small area of numbness at the tip of his middle finger. He denies much pain. He reports the left side numbness to be much better since receiving the injection. Initial HPI: Patient comes in today with complaints of bilateral hand numbness right much worse than left. He also has a history of bilateral trigger thumbs with the right side bothering him now for the left side not giving him any problems. He is recently had an EMG positive for bilateral carpal tunnel syndrome.     Date of onset: Chronic    Injury: No    Prior Treatment:  Yes: Comment: Nighttime braces    Numbness/ Tingling: Yes: Comment: Bilateral right worse than left      ROS: Review of Systems - General ROS: negative  Psychological ROS: negative  ENT ROS: negative  Allergy and Immunology ROS: negative  Hematological and Lymphatic ROS: negative  Respiratory ROS: no cough, shortness of breath, or wheezing  Cardiovascular ROS: no chest pain or dyspnea on exertion  Gastrointestinal ROS: no abdominal pain, change in bowel habits, or black or bloody stools  Musculoskeletal ROS: positive for - pain in hand - bilateral and thumb - right  Neurological ROS: positive for - numbness/tingling  Dermatological ROS: negative    Past Medical History:   Diagnosis Date    Basal cell carcinoma     BPH (benign prostatic hyperplasia)     Cancer ([facility] [address]     Basal Cell Carcinoma    Depression     Gastrointestinal disorder     Diverticulitis    GERD (gastroesophageal reflux disease)     History of colon polyps 2/21/2019    Hypertension     Inguinal hernia     left side    Other ill-defined conditions(799.89)     BPH    Psychiatric disorder     Aggression, Depression       Past Surgical History:   Procedure Laterality Date    COLONOSCOPY N/A 2/22/2019    COLONOSCOPY, SURVEILLANCE performed by Glenda Arreaga MD at [address] [place] [address] HX COLONOSCOPY      HX HERNIA REPAIR  03/14/2017    HX MALIGNANT SKIN LESION EXCISION         Current Outpatient Medications   Medication Sig Dispense Refill    fluticasone propionate (Flonase Allergy Relief) 50 mcg/actuation nasal spray 2 Sprays by Both Nostrils route daily.  atorvastatin (LIPITOR) 20 mg tablet TAKE 1 TABLET NIGHTLY 90 Tab 1    finasteride (PROSCAR) 5 mg tablet TAKE 1 TABLET DAILY 90 Tab 1    alfuzosin SR (UROXATRAL) 10 mg SR tablet TAKE 1 TABLET DAILY AFTER DINNER 90 Tab 3    OTHER L-thiamine 25 mg      omeprazole (PRILOSEC) 20 mg capsule TAKE 1 CAPSULE DAILY (Patient taking differently: two (2) times a day.) 90 Cap 3    lisinopriL (PRINIVIL, ZESTRIL) 10 mg tablet TAKE 1 TABLET DAILY 90 Tab 3    sildenafiL, pulm. hypertension, (REVATIO) 20 mg tablet Take 1 Tab by mouth as needed for Other (ED). Take up to 5 tablets as needed. Do not exceed 5 tablets. Take on an empty stomach. 90 Tab 3    allergy injection       ibuprofen (MOTRIN IB) 200 mg tablet Take 400 mg by mouth every six (6) hours as needed for Pain.  acetaminophen (TYLENOL) 500 mg tablet Take  by mouth every six (6) hours as needed for Pain.  melatonin 3 mg tablet Take 6 mg by mouth nightly.  citalopram (CELEXA) 10 mg tablet Take 1 Tab by mouth daily. 30 Tab 1       No Known Allergies        PE:     Physical Exam  Vitals signs and nursing note reviewed.    Constitutional:       General: He is not in acute distress. Appearance: Normal appearance. He is not ill-appearing. HENT:      Mouth/Throat:      Mouth: Mucous membranes are moist.   Eyes:      Extraocular Movements: Extraocular movements intact. Pupils: Pupils are equal, round, and reactive to light. Neck:      Musculoskeletal: Normal range of motion and neck supple. Cardiovascular:      Pulses: Normal pulses. Pulmonary:      Effort: Pulmonary effort is normal. No respiratory distress. Abdominal:      General: Abdomen is flat. There is no distension. Musculoskeletal: Normal range of motion. General: No swelling, tenderness, deformity or signs of injury. Right lower leg: No edema. Left lower leg: No edema. Skin:     General: Skin is warm and dry. Capillary Refill: Capillary refill takes less than 2 seconds. Findings: No bruising or erythema. Neurological:      General: No focal deficit present. Mental Status: He is alert and oriented to person, place, and time. Cranial Nerves: No cranial nerve deficit. Sensory: Sensory deficit present. Psychiatric:         Mood and Affect: Mood normal.         Behavior: Behavior normal.            Hand:    Examination L Digit(s) R Digit(s)   1st CMC Tenderness -  -    1st CMC Grind -  -    Kathrin Nodes -  -    Heberden Nodes -  -    A1 Pulley Tenderness -  + Th   Triggering -  -    UCL Instability -  -    RCL Instability -  -    Lateral Stress Pain -  -    Palmar Cords -  -    Tabletop test -  -    Garrod's Pads -  -     Strength       Pinch Strength         ROM: Full      NEUROVASCULAR    Examination L R Examination L R   Carpal Comp. + - Pronator Comp. - -   Carpal Tinel + - Pronator Tinel - -   Phalen's + - Pronator Stress - -   Cubital Comp. - - Guyon Comp. - -   Cubital Tinel - - Guyon Tinel - -   Elbow Hyperflexion - - Adson's - -   Spurling's - - SC Comp. - -   PCB Median abn - - SC Tinel - -   Radial Tinel - - IC Comp.  - -   Digital Tinel - - IC Tinel - -   Radial 2-Pt WNL WNL Ulnar 2-Pt WNL WNL     Radial Pulse: 2+  Capillary Refill: < 2 sec  Nick: Not Performed  Digital Nick: Not Performed        NCS/EMG FINDINGS:  1. The right median sensory from second digit showed prolonged distal latency, decreased amplitude and conduction velocity  2. The left median sensory from the second digit showed normal peak latency but decreased amplitude and conduction velocity  3. The right ulnar sensory from the 5th digit showed normal peak latency but decreased amplitude and conduction velocity. 4. The left ulnar from the 5th digit showed normal latency and amplitude  5. The right median motor showed prolonged distal latency but normal amplitude  6. The left median motor showed prolonged distal latency but normal amplitude  7. The left and right ulnar motor studies are normal.   8. The needle exam from the sampled left APB, FDI, FCR, and Biceps are normal.       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 Imaging:     None indicated        ICD-10-CM ICD-9-CM    1. Severe carpal tunnel syndrome, right  G56.01 354.0    2. S/P carpal tunnel release  Z98.890 V45.89    3. Trigger thumb of right hand  M65.311 727.03    4. S/P trigger finger release  Z98.890 V45.89    5. Left carpal tunnel syndrome  G56.02 354.0          Plan: We will observe the numbness in his right thumb. Have discussed range of motion exercises and scar care. At his next visit we will determine the need for any kind of surgical exploration of the digital nerve of the right thumb versus surgical treatment for his left side. Follow-up and Dispositions    · Return in about 4 weeks (around 1/18/2021) for Reevaluation and possible surgical discussion.           Plan was reviewed with patient, who verbalized agreement and understanding of the plan

## 2021-02-08 ENCOUNTER — OFFICE VISIT (OUTPATIENT)
Dept: ORTHOPEDIC SURGERY | Age: 76
End: 2021-02-08
Payer: MEDICARE

## 2021-02-08 VITALS
HEIGHT: 68 IN | TEMPERATURE: 96.9 F | BODY MASS INDEX: 23.49 KG/M2 | OXYGEN SATURATION: 100 % | SYSTOLIC BLOOD PRESSURE: 132 MMHG | HEART RATE: 59 BPM | DIASTOLIC BLOOD PRESSURE: 66 MMHG | WEIGHT: 155 LBS

## 2021-02-08 DIAGNOSIS — Z98.890 S/P TRIGGER FINGER RELEASE: ICD-10-CM

## 2021-02-08 DIAGNOSIS — G56.01 SEVERE CARPAL TUNNEL SYNDROME, RIGHT: Primary | ICD-10-CM

## 2021-02-08 DIAGNOSIS — M65.311 TRIGGER THUMB OF RIGHT HAND: ICD-10-CM

## 2021-02-08 DIAGNOSIS — Z98.890 S/P CARPAL TUNNEL RELEASE: ICD-10-CM

## 2021-02-08 PROCEDURE — 99024 POSTOP FOLLOW-UP VISIT: CPT | Performed by: ORTHOPAEDIC SURGERY

## 2021-02-08 NOTE — PROGRESS NOTES
Kamini Dunlap is a 70 y.o. male right handed retiree. Worker's Compensation and legal considerations: none filed. Vitals:    02/08/21 1000   BP: 132/66   Pulse: (!) 59   Temp: 96.9 °F (36.1 °C)   TempSrc: Skin   SpO2: 100%   Weight: 155 lb (70.3 kg)   Height: 5' 8\" (1.727 m)   PainSc:   1   PainLoc: Wrist           Chief Complaint   Patient presents with    Wrist Pain     right, follow up    Thumb Pain     right, follow up       HPI: Patient presents today 6 weeks status post right carpal tunnel release and right thumb A1 pulley release. He reports some continued numbness in the thumb however the numbness that was in the ring finger has resolved. He also reports some pain with certain activities of daily living. 2wk HPI: Patient comes in today for first postoperative appointment approximately 2 weeks status post right carpal tunnel release and right trigger thumb release. He reports numbness in the thumb since surgery. He reports some small area of numbness at the tip of his ring finger. He denies much pain. He reports the left side numbness to be much better since receiving the injection. Initial HPI: Patient comes in today with complaints of bilateral hand numbness right much worse than left. He also has a history of bilateral trigger thumbs with the right side bothering him now for the left side not giving him any problems. He is recently had an EMG positive for bilateral carpal tunnel syndrome.     Date of onset: Chronic    Injury: No    Prior Treatment:  Yes: Comment: Nighttime braces    Numbness/ Tingling: Yes: Comment: Bilateral right worse than left      ROS: Review of Systems - General ROS: negative  Psychological ROS: negative  ENT ROS: negative  Allergy and Immunology ROS: negative  Hematological and Lymphatic ROS: negative  Respiratory ROS: no cough, shortness of breath, or wheezing  Cardiovascular ROS: no chest pain or dyspnea on exertion  Gastrointestinal ROS: no abdominal pain, change in bowel habits, or black or bloody stools  Musculoskeletal ROS: positive for - pain in hand - bilateral and thumb - right  Neurological ROS: positive for - numbness/tingling  Dermatological ROS: negative    Past Medical History:   Diagnosis Date    Basal cell carcinoma     BPH (benign prostatic hyperplasia)     Cancer (HCC)     Basal Cell Carcinoma    Depression     Gastrointestinal disorder     Diverticulitis    GERD (gastroesophageal reflux disease)     History of colon polyps 2/21/2019    Hypertension     Inguinal hernia     left side    Other ill-defined conditions(799.89)     BPH    Psychiatric disorder     Aggression, Depression       Past Surgical History:   Procedure Laterality Date    COLONOSCOPY N/A 2/22/2019    COLONOSCOPY, SURVEILLANCE performed by Anish Zee MD at [address] [place] [address] HX CARPAL TUNNEL RELEASE      HX COLONOSCOPY      HX HERNIA REPAIR  03/14/2017    HX MALIGNANT SKIN LESION EXCISION         Current Outpatient Medications   Medication Sig Dispense Refill    fluticasone propionate (Flonase Allergy Relief) 50 mcg/actuation nasal spray 2 Sprays by Both Nostrils route daily.  atorvastatin (LIPITOR) 20 mg tablet TAKE 1 TABLET NIGHTLY 90 Tab 1    finasteride (PROSCAR) 5 mg tablet TAKE 1 TABLET DAILY 90 Tab 1    alfuzosin SR (UROXATRAL) 10 mg SR tablet TAKE 1 TABLET DAILY AFTER DINNER 90 Tab 3    OTHER L-thiamine 25 mg      omeprazole (PRILOSEC) 20 mg capsule TAKE 1 CAPSULE DAILY (Patient taking differently: two (2) times a day.) 90 Cap 3    lisinopriL (PRINIVIL, ZESTRIL) 10 mg tablet TAKE 1 TABLET DAILY 90 Tab 3    sildenafiL, pulm. hypertension, (REVATIO) 20 mg tablet Take 1 Tab by mouth as needed for Other (ED). Take up to 5 tablets as needed. Do not exceed 5 tablets. Take on an empty stomach. 90 Tab 3    allergy injection       ibuprofen (MOTRIN IB) 200 mg tablet Take 400 mg by mouth every six (6) hours as needed for Pain.       acetaminophen (TYLENOL) 500 mg tablet Take  by mouth every six (6) hours as needed for Pain. No Known Allergies        PE:     Physical Exam  Vitals signs and nursing note reviewed. Constitutional:       General: He is not in acute distress. Appearance: Normal appearance. He is not ill-appearing. Eyes:      Extraocular Movements: Extraocular movements intact. Pupils: Pupils are equal, round, and reactive to light. Neck:      Musculoskeletal: Normal range of motion and neck supple. Cardiovascular:      Pulses: Normal pulses. Pulmonary:      Effort: Pulmonary effort is normal. No respiratory distress. Abdominal:      General: Abdomen is flat. There is no distension. Musculoskeletal: Normal range of motion. General: No swelling, tenderness, deformity or signs of injury. Right lower leg: No edema. Left lower leg: No edema. Skin:     General: Skin is warm and dry. Capillary Refill: Capillary refill takes less than 2 seconds. Findings: No bruising or erythema. Neurological:      General: No focal deficit present. Mental Status: He is alert and oriented to person, place, and time. Cranial Nerves: No cranial nerve deficit. Sensory: Sensory deficit present. Psychiatric:         Mood and Affect: Mood normal.         Behavior: Behavior normal.            Hand: There is continued paresthesia about the thumb. Examination L Digit(s) R Digit(s)   1st CMC Tenderness -  -    1st CMC Grind -  -    Kathrin Nodes -  -    Heberden Nodes -  -    A1 Pulley Tenderness -  - Th   Triggering -  -    UCL Instability -  -    RCL Instability -  -    Lateral Stress Pain -  -    Palmar Cords -  -    Tabletop test -  -    Garrod's Pads -  -     Strength       Pinch Strength         ROM: Full      NEUROVASCULAR    Examination L R Examination L R   Carpal Comp. + - Pronator Comp. - -   Carpal Tinel + - Pronator Tinel - -   Phalen's + - Pronator Stress - -   Cubital Comp. - - Guyon Comp.  - - Cubital Tinel - - Guyon Tinel - -   Elbow Hyperflexion - - Adson's - -   Spurling's - - SC Comp. - -   PCB Median abn - - SC Tinel - -   Radial Tinel - - IC Comp. - -   Digital Tinel - - IC Tinel - -   Radial 2-Pt WNL WNL Ulnar 2-Pt WNL WNL     Radial Pulse: 2+  Capillary Refill: < 2 sec  Nick: Not Performed  Digital Nick: Not Performed        NCS/EMG FINDINGS:  1. The right median sensory from second digit showed prolonged distal latency, decreased amplitude and conduction velocity  2. The left median sensory from the second digit showed normal peak latency but decreased amplitude and conduction velocity  3. The right ulnar sensory from the 5th digit showed normal peak latency but decreased amplitude and conduction velocity. 4. The left ulnar from the 5th digit showed normal latency and amplitude  5. The right median motor showed prolonged distal latency but normal amplitude  6. The left median motor showed prolonged distal latency but normal amplitude  7. The left and right ulnar motor studies are normal.   8. The needle exam from the sampled left APB, FDI, FCR, and Biceps are normal.       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 Imaging:     None indicated        ICD-10-CM ICD-9-CM    1. Severe carpal tunnel syndrome, right  G56.01 354.0 REFERRAL TO OCCUPATIONAL THERAPY   2. S/P carpal tunnel release  Z98.890 V45.89 REFERRAL TO OCCUPATIONAL THERAPY   3. Trigger thumb of right hand  M65.311 727.03 REFERRAL TO OCCUPATIONAL THERAPY   4. S/P trigger finger release  Z98.890 V45.89 REFERRAL TO OCCUPATIONAL THERAPY         Plan:     I discussed the possibility of exploration of his right thumb A1 pulley release. He would like to wait at this time. OT for range of motion exercises, edema control, and other modalities.     Follow-up and Dispositions    · Return in about 2 months (around 4/8/2021) for Reevaluation and OT follow-up.           Plan was reviewed with patient, who verbalized agreement and understanding of the plan

## 2021-02-12 ENCOUNTER — HOSPITAL ENCOUNTER (OUTPATIENT)
Dept: PHYSICAL THERAPY | Age: 76
Discharge: HOME | End: 2021-02-12
Payer: MEDICARE

## 2021-02-12 PROCEDURE — 97110 THERAPEUTIC EXERCISES: CPT

## 2021-02-12 PROCEDURE — 97535 SELF CARE MNGMENT TRAINING: CPT

## 2021-02-12 PROCEDURE — 97165 OT EVAL LOW COMPLEX 30 MIN: CPT

## 2021-02-12 NOTE — PROGRESS NOTES
[facility]  [address]  [place], [address]  [phone] fax    Plan of Care/Statement of Necessity for Occupational Therapy Services    Patient name: Phyllis Sanchez Start of Care: 2021   Referral source: Piercejoslyn KlineDO : 1945    Medical Diagnosis: Right wrist pain [M25.531]  Pain in right hand [M79.641]  Payor: [place] MEDICARE / Plan: [place] MEDICARE PART A & B / Product Type: Medicare /  Onset Date:12/10/2021    Treatment Diagnosis: right hand pain   Prior Hospitalization: see medical history Provider#: [id number]   Medications: Verified on Patient summary List    Comorbidities: arthritis, depression, cancer, alcohol use, hearing deficits, vision deficits, hypertension   Prior Level of Function: Independent without limitations in I/ADLs. The Plan of Care and following information is based on the information from the initial evaluation. Assessment/ key information: Patient is a 70 y.o. male with a chief complaint of right hand pain, mostly at ulnar side of right hand, and some at the proximal end of wrist scar. Pt underwent right carpal tunnel release and right thumb A1 pulley release on 2020. He reports some continued numbness in the thumb. Pt is able to make full fist and to oppose each finger to thumb. Patient received an initial evaluation today followed by education as to diagnosis, precautions and treatment plan. Some edema noted distal to right wrist, however, does not affect his thumb ROM. Wrist ROM is WFL for the pt as compared to the opposite hand. Pt noted to have limitations in strength. His  strength is 33 lbs on right, as compared to 66 lbs on the left non-dominant hand. Pinch strength is also decreased on right.  Pt reports he would like to improve his pain levels with activity and to improve strength in right hand Patient was provided with a basic home exercise program including median nerve glides and ulnar nerve glides, education on sleep positioning. Evaluation Complexity: History LOW Complexity : Brief history review  Examination LOW Complexity : 1-3 performance deficits relating to physical, cognitive , or psychosocial skils that result in activity limitations and / or participation restrictions  Clinical Decision Making LOW Complexity : No comorbidities that affect functional and no verbal or physical assistance needed to complete eval tasks   Overall Complexity Rating: LOW      Patient would benefit from OT/Hand therapy services for the following problems:  Problem List: Pain effecting function, Decreased strength, Edema effecting function and Sensability               Treatment Plan may include any combination of the following: Therapeutic exercise, Therapeutic activities, Neuromuscular re-education, Physical agent/modality, Scar management, Manual therapy, Splinting/orthoses, Patient education and ADLs/IADLs     Patient / Family readiness to learn indicated by: asking questions, trying to perform skills and interest     Persons(s) to be included in education:   patient (P)     Barriers to Learning/Limitations: None     Patient Goal (s): The pain is better     Patient Self Reported Health Status: excellent     Rehabilitation Potential: good     Short Term Goals: To be accomplished in 2  weeks:  1. Patient will be compliant with initial home exercise program to take an active role in their rehabilitation process. Status at Eval: issued initial HEP     2. Patient will demonstrate a good understanding of their condition and strategies for self-management. Status at Eval: educated on sleep positioning, edema management     Long Term Goals: To be accomplished in 4 weeks:              1. Pt will have 60 pounds of  in the right hand to allow for functional grasp for all ADL activities including dressing, bathing and self care.   Status at [facility]: 33 lbs     2. Patient will have improved right tip pinch strength to 14 pounds in order to perform fine motor tasks such as zippiing up zippers or opening containers. Status at Eval: 10 lbs     3. Patient will have improved right fernando pinch strength of 15 pounds in order to perform fine motor tasks such as writing. Status at Eval: 11 lbs     4. Patient will report pain no greated than 2/10 in right hand after 20 minutes of sustained activity. Status at Eval: 4/10     5. Patient will have 25% improvement in right thumb parasthesias to improve his ability to use right hand to open small packages. Status at Eval: numbness in right thumb     6 Patient will show a 5 point improvement on FOTO functional status measure to improve overall functional performance. Status at Eval: 64        Frequency / Duration: Patient to be seen 2 times per week for 4 weeks:     Patient/ Caregiver education and instruction: Diagnosis, prognosis, self care, activity modification and exercises    Certification Period: 2/12/21-3/12/21    PRUDENCE Valentino 2/12/2021 1:27 PM  ________________________________________________________________________    I certify that the above Therapy Services are being furnished while the patient is under my care. I agree with the treatment plan and certify that this therapy is necessary.     [de-identified] Signature:____________Date:_________TIME:________     Darell Islas DO  ** Signature, Date and Time must be completed for valid certification **    Please sign and return to [facility] [address] [address] [address]  [place], [address]  [phone] fax

## 2021-02-12 NOTE — PROGRESS NOTES
Hand Therapy Evaluation and Daily Note    Patient Name: Steven Torres  Date:2021  : 1945  Age: 70 y.o.y/o  [x]  Patient  Verified  Payor: Deborah Hahn / Plan: [place] MEDICARE PART A & B / Product Type: Medicare /    Referring Provider: DO Emerita Valle MD Visit:  2021  Onset Date:  12/10/2021  Surgical Date: 2021  Surgical Procedure: Right carpal tunnel release and right thumb A1 pulley release    In time:09:30  Out time:10:15  Total Treatment Time (min): 45  Total Timed Codes (min): 30  1:1 Treatment Time (MC only): 39   Visit #: 1 of 8    Treatment Area: Right wrist pain [M25.531]  Pain in right hand [M79.641]    Precautions: arthritis, depression, cancer, alcohol use, hearing deficits, vision deficits, hypertension    Hand Dominance: right handed   Hand Involved: right    Total Evaluation Time:  15    History of Present Condition:  Patient is a 70 y.o. male with a chief complaint of right hand pain, mostly at ulnar side of right hand, and some at the proximal end of wrist scar. Pt underwent right carpal tunnel release and right thumb A1 pulley release on 2020. He reports some continued numbness in the thumb. Pt is able to make full fist and to oppose each finger to thumb. Pt presents today with orders for OT for range of motion exercises, edema control, and other modalities. Pain Rating:   Current: (0-no pain 10-debilitating pain) 1/10  At best: (0-no pain 10-debilitating pain) 1/10  At worst: (0-no pain 10-debilitating pain) 4/10  Location:ulnar side of the hand  Type:  Throbbing at the ulnar side of the hand, burning at the wrist scar   Better with: rest  Worse with: activity    Medications/Allergies/Past Medical History:  See chart; reviewed with patient. Diagnostic Tests: Imaging:  EMG positive for bilateral carpal tunnel syndrome. Prior Level of Function: Retired.  Independent with I/ADLs without limitations    Current Level of Function: Retired, does volunteer work. Independent with ADLs, but requires modifications. Social History: Lives with spouse. Very active. Does a lot of walking. Occupation/Job Requirements: Retired. Volunteers at the [employer], responsibilities include raking rake, mopping the floors, sweeping. Home responsibilities include most of the house work, cooking, vacuuming, pet care, etc.    Observation: mild resting tremors. Per pt he has Essential tremors. 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 Palpation: mild tenderness on palpation at the volar wrist scar    Range of Motion:    Wrist Active/Passive ROM  Wrist 2/12/2021  R 2/12/2021  L      Flex 58 64      Ext 41 30        Strength:     Measurements: Taken with Flo Dynamometer, in Lbs   Level 2 2/12/2021   Date Date Date Date Date Date   Right 33lbs         Left 66lbs         Deficit          Change                Pinch Measurements: Taken with Pinch Gauge, in Lbs   (hand) 2/12/2021   Date Date Date Date Date   Lateral          Right 19        Left  24        Deficit         Change         Pad         Right 10        Left 14        Deficit         Change         [place]         Right 11        Left 16        Deficit         Change             Sensation:  Numbness in right thumb. Slight numbness in SF, which is resolving  Edema:  GIRTH CHART measured in cm  Date: 2/12/2021 2/12/2021     Side R L   DPC circum.  22.5 22.4   Wrist Crease 18.7 18.4   FA      Elbow     Bicep     Axilla          Coordination:  Nine-Hole Peg Test:  Left= __21___seconds  Right=___21__seconds        ADLs  Feeding:        []MaxA   []ModA   []Sami   [] CGA   []SBA   [x][place]   []Independent  UE Dressing:       []MaxA   []ModA   []Sami   [] CGA   []SBA   [x][place]   []Independent  LE Dressing:       []MaxA   []ModA   []Sami   [] CGA   []SBA   [x][place]   []Independent  Grooming: []MaxA   []ModA   []Sami   [] CGA   []SBA   [x][place]   []Independent  Toileting:       []MaxA   []ModA   []Sami   [] CGA   []SBA   [x][place]   []Independent  Bathing:       []MaxA   []ModA   []Sami   [] CGA   []SBA   [x][place]   []Independent  Light Meal Prep:    []MaxA   []ModA   []Sami   [] CGA   []SBA   [x][place]   []Independent  Household/Other: []MaxA   []ModA   []Sami   [] CGA   []SBA   [x][place]   []Independent  Adaptive Equip:     []MaxA   []ModA   []Sami   [] CGA   []SBA   [x][place]   []Independent  Driving:       []MaxA   []ModA   []Sami   [] CGA   []SBA   [x][place]   []Independent      Todays Treatment:  Patient received an initial evaluation today followed by education as to diagnosis, precautions and treatment plan. Patient was provided with a basic home exercise program including median nerve glides and ulnar nerve glides, education on sleep positioning. OBJECTIVE  10 min Therapeutic Exercise:  [x] See flow sheet :   Rationale: increase strength to improve the patients ability to use right hand for vacuuming at home.     20 min Self Care/Home Management: education on sleep positioning, edema management, activity modifications   Rationale: increase strength and improve coordination and decrease pain to improve the patients ability to use right hand for IADLs at home    With   [] TE   [] TA   [] neuro   [] other: Patient Education: [x] Issued initial HEP    [] Progressed/Changed HEP based on:   [] positioning   [] body mechanics   [] transfers   [] heat/ice application   [] Splint wear/care   [] Sensory re-education   [] scar management      [] other:      Pain Level (0-10 scale) post treatment: 1/10    Patient will continue to benefit from skilled OT services to modify and progress therapeutic interventions, address ROM deficits, address strength deficits, analyze and address soft tissue restrictions, analyze and cue movement patterns and instruct in home and community integration to attain goals.    Assessment: Some edema noted distal to right wrist, however, does not affect his thumb ROM. Wrist ROM is WFL for the pt as compared to the opposite hand. Pt noted to have limitations in strength. His  strength is 33 lbs on right, as compared to 66 lbs on the left non-dominant hand. Pinch strength is also decreased on right. Pt reports he would like to improve his pain levels with activity and to improve strength in right hand. Evaluation Complexity: History LOW Complexity : Brief history review  Examination LOW Complexity : 1-3 performance deficits relating to physical, cognitive , or psychosocial skils that result in activity limitations and / or participation restrictions  Clinical Decision Making LOW Complexity : No comorbidities that affect functional and no verbal or physical assistance needed to complete eval tasks   Overall Complexity Rating: LOW     Patient would benefit from OT/Hand therapy services for the following problems:  Problem List: Pain effecting function, Decreased strength, Edema effecting function and Sensability     Treatment Plan may include any combination of the following: Therapeutic exercise, Therapeutic activities, Neuromuscular re-education, Physical agent/modality, Scar management, Manual therapy, Splinting/orthoses, Patient education and ADLs/IADLs    Patient / Family readiness to learn indicated by: asking questions, trying to perform skills and interest    Persons(s) to be included in education:   patient (P)    Barriers to Learning/Limitations: None    Patient Goal (s): The pain is better    Patient Self Reported Health Status: excellent    Rehabilitation Potential: good    Short Term Goals: To be accomplished in 2  weeks:  1. Patient will be compliant with initial home exercise program to take an active role in their rehabilitation process. Status at Eval: issued initial HEP    2.  Patient will demonstrate a good understanding of their condition and strategies for self-management. Status at Eval: educated on sleep positioning, edema management    Long Term Goals: To be accomplished in 4 weeks:   1. Pt will have 60 pounds of  in the right hand to allow for functional grasp for all ADL activities including dressing, bathing and self care. Status at Eval: 33 lbs    2. Patient will have improved right tip pinch strength to 14 pounds in order to perform fine motor tasks such as zippiing up zippers or opening containers. Status at Eval: 10 lbs    3. Patient will have improved right fernando pinch strength of 15 pounds in order to perform fine motor tasks such as writing. Status at Eval: 11 lbs    4. Patient will report pain no greated than 2/10 in right hand after 20 minutes of sustained activity. Status at Eval: 4/10    5. Patient will have 25% improvement in right thumb parasthesias to improve his ability to use right hand to open small packages. Status at Eval: numbness in right thumb    6 Patient will show a 5 point improvement on FOTO functional status measure to improve overall functional performance.   Status at Eval: 64      Frequency / Duration: Patient to be seen 2 times per week for 4 weeks:    Patient/ Caregiver education and instruction: Diagnosis, prognosis, self care, activity modification and exercises      Certification Period: 2/12/21-3/12/21    VAN Ocampo/L, 2/12/2021 9:27 AM

## 2021-02-17 ENCOUNTER — APPOINTMENT (OUTPATIENT)
Dept: PHYSICAL THERAPY | Age: 76
End: 2021-02-17
Payer: MEDICARE

## 2021-02-19 ENCOUNTER — APPOINTMENT (OUTPATIENT)
Dept: PHYSICAL THERAPY | Age: 76
End: 2021-02-19
Payer: MEDICARE

## 2021-02-24 ENCOUNTER — HOSPITAL ENCOUNTER (OUTPATIENT)
Dept: PHYSICAL THERAPY | Age: 76
Discharge: HOME | End: 2021-02-24
Payer: MEDICARE

## 2021-02-24 PROCEDURE — 97110 THERAPEUTIC EXERCISES: CPT

## 2021-02-24 PROCEDURE — 97022 WHIRLPOOL THERAPY: CPT

## 2021-02-24 PROCEDURE — 97140 MANUAL THERAPY 1/> REGIONS: CPT

## 2021-02-24 NOTE — PROGRESS NOTES
OT DAILY TREATMENT NOTE     Patient Name: Sydney Smith  Date:2021  : 1945  [x]  Patient  Verified  Payor: Cee Dhillont / Plan: [place] / Product Type: Medicare /    In time: 09:30 Out time:10:08  Total Treatment Time (min): 38  Visit #: 2 of 8    Medicare/BCBS Only   Total Timed Codes (min):  23 1:1 Treatment Time:  38     Treatment Area: Right wrist pain [M25.531]  Pain in right hand [M79.641]    SUBJECTIVE  Pain Level (0-10 scale): 1/10  Any medication changes, allergies to medications, adverse drug reactions, diagnosis change, or new procedure performed?: [x] No    [] Yes (see summary sheet for update)  Subjective functional status/changes:   [] No changes reported  \"My hand still hurts.  I did notice some improvement with range of motion\"    OBJECTIVE    Modality rationale: decrease inflammation, decrease pain and increase tissue extensibility to improve the patients ability to improve pt's ability to use right hand for functional tasks   Min Type Additional Details    [] Estim:  []Unatt       []IFC  []Premod                        []Other:  []w/ice   []w/heat  Position:  Location:    [] Estim: []Att    []TENS instruct  []NMES                    []Other:  []w/US   []w/ice   []w/heat  Position:  Location:    []  Traction: [] Cervical       []Lumbar                       [] Prone          []Supine                       []Intermittent   []Continuous Lbs:  [] before manual  [] after manual    []  Ultrasound: []Continuous   [] Pulsed                           []1MHz   []3MHz W/cm2:  Location:    []  Iontophoresis with dexamethasone         Location: [] Take home patch   [] In clinic   15 []  Ice     [x]  heat Fluido therapy  []  Ice massage  []  Laser   []  Paraffin Position: AROM  Location: Right hand    []  Laser with stim  []  Other:  Position:  Location:    []  Vasopneumatic Device Pressure:       [] lo [] med [] hi   Temperature: [] lo [] med [] hi       [x] Skin assessment post-treatment:  []intact [x]redness- no adverse reaction    []redness  adverse reaction:     13 min Therapeutic Exercise:  [x] See flow sheet :   Rationale: increase strength and improve coordination to improve the patients ability to improve pt's ability to hold grocery bag in right hand    10 min Manual Therapy:  IASTM #6 and 4 using fanning and sweeping techniques over right wrist, thenar eminence and thumb   The manual therapy interventions were performed at a separate and distinct time from the therapeutic activities interventions. Rationale: decrease pain, increase ROM and increase tissue extensibility to decrease pain with ADLs and functional activities. With   [x] TE   [] TA   [] neuro   [] other: Patient Education: [x] Review HEP    [x] Progressed/Changed HEP based on: yellow t-putty  [] positioning   [] body mechanics   [] transfers   [] heat/ice application   [] Splint wear/care   [] Sensory re-education   [] scar management      [] other:            Other Objective/Functional Measures: Good compliance and form with HEP    Pain Level (0-10 scale) post treatment: 1/10    ASSESSMENT/Changes in Function: Pt reports compliance with his HEP, and noticing mild improvements in stiffness. 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 Pt is progressing towards all goals. Introduced light strengthening TherEx with T-putty, with no pain reported by pt. Patient will continue to benefit from skilled OT services to modify and progress therapeutic interventions, address functional mobility deficits, address ROM deficits, address strength deficits and instruct in home and community integration to attain remaining goals. [x]  See Plan of Care  []  See progress note/recertification  []  See Discharge Summary         Progress towards goals / Updated goals:  Short Term Goals: To be accomplished in 2  weeks:  1.  Patient will be compliant with initial home exercise program to take an active role in their rehabilitation process. Status at Eval: issued initial HEP  2/24/21: Goal met. Pt compliant with initial HEP.     2. Patient will demonstrate a good understanding of their condition and strategies for self-management. Status at Eval: educated on sleep positioning, edema management  2/24/21: Pt reports forgetting to perform scar massage. Re-educated on scar massage techniques. Long Term Goals: To be accomplished in 4 weeks:              1. Pt will have 60 pounds of  in the right hand to allow for functional grasp for all ADL activities including dressing, bathing and self care. Status at Eval: 33 lbs      2. Patient will have improved right tip pinch strength to 14 pounds in order to perform fine motor tasks such as zippiing up zippers or opening containers. Status at Eval: 10 lbs     3. Patient will have improved right fernando pinch strength of 15 pounds in order to perform fine motor tasks such as writing. Status at Eval: 11 lbs     4. Patient will report pain no greated than 2/10 in right hand after 20 minutes of sustained activity. Status at Eval: 4/10     5. Patient will have 25% improvement in right thumb parasthesias to improve his ability to use right hand to open small packages. Status at Eval: numbness in right thumb  2/24/21: approx 50% improvement. Goal met     6 Patient will show a 5 point improvement on FOTO functional status measure to improve overall functional performance.   Status at Eval: 64    PLAN  [x]  Upgrade activities as tolerated     [x]  Continue plan of care  []  Update interventions per flow sheet       []  Discharge due to:_  []  Other:_      Collette Hunt, OTR/L 2/24/2021  9:28 AM    Future Appointments   Date Time Provider Sander Tabor   2/24/2021  9:30 AM VAN Hawk/SHAZIA [facility]   2/26/2021  8:45 [facility]   3/3/2021 10:45 AM Inge Florentino, OT [facility]   3/5/2021  8:45 AM Thang Varma, OTR/L [facility]   3/10/2021  8:00 AM Inge Florentino, OT [facility]   3/12/2021  8:00 AM Inge Florentino, OT [facility]   3/17/2021  8:00 AM Inge Florentino, OT [facility]   4/12/2021  8:15 AM Cesario Chatman DO [facility]

## 2021-02-26 ENCOUNTER — HOSPITAL ENCOUNTER (OUTPATIENT)
Dept: PHYSICAL THERAPY | Age: 76
Discharge: HOME | End: 2021-02-26
Payer: MEDICARE

## 2021-02-26 PROCEDURE — 97110 THERAPEUTIC EXERCISES: CPT

## 2021-02-26 PROCEDURE — 97022 WHIRLPOOL THERAPY: CPT

## 2021-02-26 PROCEDURE — 97140 MANUAL THERAPY 1/> REGIONS: CPT

## 2021-02-26 NOTE — PROGRESS NOTES
OT DAILY TREATMENT NOTE     Patient Name: Racquel Route  Date:2021  : 1945  [x]  Patient  Verified  Payor: Arminda Gandhi / Plan: [place] MEDICARE PART A & B / Product Type: Medicare /    In time:08:48  Out time:09:26  Total Treatment Time (min): 38  Visit #: 3 of 8    Medicare/BCBS Only   Total Timed Codes (min):  23 1:1 Treatment Time:  38     Treatment Area: Right wrist pain [M25.531]  Pain in right hand [M79.641]    SUBJECTIVE  Pain Level (0-10 scale): 2/10  Any medication changes, allergies to medications, adverse drug reactions, diagnosis change, or new procedure performed?: [x] No    [] Yes (see summary sheet for update)  Subjective functional status/changes:   [] No changes reported  \"I pushed up with my hand yesterday and it was hurting for awhile after that\"    OBJECTIVE    Modality rationale: decrease pain and increase tissue extensibility to improve the patients ability to use right hand for functional tasks   Min Type Additional Details    [] Estim:  []Unatt       []IFC  []Premod                        []Other:  []w/ice   []w/heat  Position:  Location:    [] Estim: []Att    []TENS instruct  []NMES                    []Other:  []w/US   []w/ice   []w/heat  Position:  Location:    []  Traction: [] Cervical       []Lumbar                       [] Prone          []Supine                       []Intermittent   []Continuous Lbs:  [] before manual  [] after manual    []  Ultrasound: []Continuous   [] Pulsed                           []1MHz   []3MHz W/cm2:  Location:    []  Iontophoresis with dexamethasone         Location: [] Take home patch   [] In clinic   15 []  Ice     [x]  heat Fluido therapy  []  Ice massage  []  Laser   []  Paraffin Position: AROM  Location: Right hand and wrist    []  Laser with stim  []  Other:  Position:  Location:    []  Vasopneumatic Device Pressure:       [] lo [] med [] hi   Temperature: [] lo [] med [] hi       [x] Skin assessment post-treatment:  []intact [x]redness- no adverse reaction    13 min Therapeutic Exercise:  [x] See flow sheet :   Rationale: increase ROM, increase strength and improve coordination to improve the patients ability to manipulate fasteners with right hand. 10 min Manual Therapy:  IASTM #4, 6 using sweeping, fanning techniques   The manual therapy interventions were performed at a separate and distinct time from the therapeutic activities interventions. Rationale: decrease pain, increase ROM and increase tissue extensibility to improve pt's ability to hold objects in right hand. With   [] TE   [] TA   [] neuro   [] other: Patient Education: [x] Review HEP    [] Progressed/Changed HEP based on:   [] positioning   [] body mechanics   [] transfers   [] heat/ice application   [] Splint wear/care   [] Sensory re-education   [] scar management      [] other:            Other Objective/Functional Measures: progressing with strength tasks     Pain Level (0-10 scale) post treatment: 2/10    ASSESSMENT/Changes in Function: Pt progressing towards goals, introduced light weight bearing exercises with good effort from pt and mild increase in symptoms. Patient will continue to benefit from skilled OT services to modify and progress therapeutic interventions, address ROM deficits, address strength deficits and analyze and address soft tissue restrictions to attain remaining goals. [x]  See Plan of Care  []  See progress note/recertification  []  See Discharge Summary         Progress towards goals / Updated goals:  Short Term Goals: To be accomplished in 2  weeks:  1. Patient will be compliant with initial home exercise program to take an active role in their rehabilitation process. Status at [facility]: issued initial HEP  2/24/21: Goal met. Pt compliant with initial HEP.     2. Patient will demonstrate a good understanding of their condition and strategies for self-management.   Status at [facility]: educated on sleep positioning, edema management  2/24/21: Pt reports forgetting to perform scar massage. Re-educated on scar massage techniques.     Long Term Goals: To be accomplished in 4 weeks:              1. Pt will have 60 pounds of  in the right hand to allow for functional grasp for all ADL activities including dressing, bathing and self care. Status at Eval: 33 lbs   2/26/21: progressing with strength tasks in clinic.     2. Patient will have improved right tip pinch strength to 14 pounds in order to perform fine motor tasks such as zippiing up zippers or opening containers. Status at Eval: 10 lbs     3. Patient will have improved right fernando pinch strength of 15 pounds in order to perform fine motor tasks such as writing. Status at Eval: 11 lbs     4. Patient will report pain no greated than 2/10 in right hand after 20 minutes of sustained activity. Status at Eval: 4/10  2/26/2021: 2/10 after 13 min of exercises    5. Patient will have 25% improvement in right thumb parasthesias to improve his ability to use right hand to open small packages. Status at Eval: numbness in right thumb  2/24/21: approx 50% improvement. Goal met     6 Patient will show a 5 point improvement on FOTO functional status measure to improve overall functional performance.   Status at Eval: 64    PLAN  [x]  Upgrade activities as tolerated     [x]  Continue plan of care  []  Update interventions per flow sheet       []  Discharge due to:_  []  Other:_      VAN Chavez/L 2/26/2021  8:50 AM    Future Appointments   Date Time Provider Sander Tabor   3/3/2021 10:45 AM Basil Sires, OT [facility]   3/5/2021  8:45 AM Pepe Person OTR/L [facility]   3/10/2021  8:00 AM Basil Sires, OT [facility]   3/12/2021  8:00 AM Basil Sires, OT [facility]   3/17/2021  8:00 AM Basil Sires, OT [facility]   4/12/2021  8:15 AM Bernice Sever, Shawn A, DO [facility]

## 2021-03-03 ENCOUNTER — HOSPITAL ENCOUNTER (OUTPATIENT)
Dept: PHYSICAL THERAPY | Age: 76
Discharge: HOME | End: 2021-03-03
Payer: MEDICARE

## 2021-03-03 PROCEDURE — 97110 THERAPEUTIC EXERCISES: CPT

## 2021-03-03 PROCEDURE — 97022 WHIRLPOOL THERAPY: CPT

## 2021-03-03 PROCEDURE — 97140 MANUAL THERAPY 1/> REGIONS: CPT

## 2021-03-03 NOTE — PROGRESS NOTES
OT DAILY TREATMENT NOTE     Patient Name: Apurva Jones  Date:3/3/2021  : 1945  [x]  Patient  Verified  Payor: [place] MEDICARE / Plan: [place] MEDICARE PART A & B / Product Type: Medicare /    In time:10:45 AM  Out time:11:25 AM  Total Treatment Time (min): 40  Visit #: 4 of 8    Medicare/BCBS Only   Total Timed Codes (min):  25 1:1 Treatment Time:  40     Treatment Area: Right wrist pain [M25.531]  Pain in right hand [M79.641]    SUBJECTIVE  Pain Level (0-10 scale): 2/10  Any medication changes, allergies to medications, adverse drug reactions, diagnosis change, or new procedure performed?: [x] No    [] Yes (see summary sheet for update)  Subjective functional status/changes:   [] No changes reported  \"Yesterday I had to use the ragini and that made it sore. But it was good to do it. \"    OBJECTIVE    Modality rationale: decrease pain and increase tissue extensibility to improve the patients ability to functionally use right hand   Min Type Additional Details    [] Estim:  []Unatt       []IFC  []Premod                        []Other:  []w/ice   []w/heat  Position:  Location:    [] Estim: []Att    []TENS instruct  []NMES                    []Other:  []w/US   []w/ice   []w/heat  Position:  Location:    []  Traction: [] Cervical       []Lumbar                       [] Prone          []Supine                       []Intermittent   []Continuous Lbs:  [] before manual  [] after manual    []  Ultrasound: []Continuous   [] Pulsed                           []1MHz   []3MHz W/cm2:  Location:    []  Iontophoresis with dexamethasone         Location: [] Take home patch   [] In clinic   15 []  Ice     [x]  Heat - fluidotherapy  []  Ice massage  []  Laser   []  Paraffin Position: AROM  Location: right hand/wrist    []  Laser with stim  []  Other:  Position:  Location:    []  Vasopneumatic Device Pressure:       [] lo [] med [] hi   Temperature: [] lo [] med [] hi       [x] Skin assessment post-treatment:  [x]intact []redness- no adverse reaction    []redness  adverse reaction:     15 min Therapeutic Exercise:  [x] See flow sheet :   Rationale: increase ROM, increase strength, improve coordination and increase proprioception to improve the patients ability to grasp,     10 min Manual Therapy:  IASTM using tools #4, 6 using sweeping and fanning techniques. The manual therapy interventions were performed at a separate and distinct time from the therapeutic activities interventions. Rationale: decrease pain, increase ROM, increase tissue extensibility and decrease edema  to right volar wrist, palm and thumb. With   [] TE   [] TA   [] neuro   [] other: Patient Education: [x] Review HEP    [] Progressed/Changed HEP based on:   [] positioning   [] body mechanics   [] transfers   [] heat/ice application   [] Splint wear/care   [] Sensory re-education   [] scar management      [] other:            Other Objective/Functional Measures: no difficulty with progression of  and pinch strengthening      Pain Level (0-10 scale) post treatment: 1/10    ASSESSMENT/Changes in Function: Strength improving. Continues to report reducing symptoms in thumb. Patient will continue to benefit from skilled OT services to modify and progress therapeutic interventions, address ROM deficits, address strength deficits, analyze and address soft tissue restrictions and instruct in home and community integration to attain remaining goals. []  See Plan of Care  []  See progress note/recertification  []  See Discharge Summary         Progress towards goals / Updated goals:  Short Term Goals: To be accomplished in 2  weeks:  1. Patient will be compliant with initial home exercise program to take an active role in their rehabilitation process. Status at [facility]: issued initial HEP  2/24/21: Goal met.  Pt compliant with initial HEP.     2. Patient will demonstrate a good understanding of their condition and strategies for self-management. Status at Eval: educated on sleep positioning, edema management  2/24/21: Pt reports forgetting to perform scar massage. Re-educated on scar massage techniques.     Long Term Goals: To be accomplished in 4 weeks:              1. Pt will have 60 pounds of  in the right hand to allow for functional grasp for all ADL activities including dressing, bathing and self care. Status at Eval: 33 lbs   2/26/21: progressing with strength tasks in clinic.     2. Patient will have improved right tip pinch strength to 14 pounds in order to perform fine motor tasks such as zippiing up zippers or opening containers. Status at Eval: 10 lbs     3. Patient will have improved right fernando pinch strength of 15 pounds in order to perform fine motor tasks such as writing. Status at Eval: 11 lbs     4. Patient will report pain no greated than 2/10 in right hand after 20 minutes of sustained activity. Status at Eval: 4/10  2/26/2021: 2/10 after 13 min of exercises     5. Patient will have 25% improvement in right thumb parasthesias to improve his ability to use right hand to open small packages. Status at Eval: numbness in right thumb  2/24/21: approx 50% improvement. Goal met     6 Patient will show a 5 point improvement on FOTO functional status measure to improve overall functional performance.   Status at Eval: 64    PLAN  []  Upgrade activities as tolerated     [x]  Continue plan of care  []  Update interventions per flow sheet       []  Discharge due to:_  []  Other:_      Bryant Russell OT 3/3/2021  10:50 AM    Future Appointments   Date Time Provider Sander Tabor   3/5/2021  8:45 AM VAN Lima/SHAZIA [facility]   [facility]10[facility]   3[facility]12/2021  [facility]   3[facility]17/2021  [facility]   4/12/2021  8:15 AM Cesario Morris, DO [facility]

## 2021-03-05 ENCOUNTER — HOSPITAL ENCOUNTER (OUTPATIENT)
Dept: PHYSICAL THERAPY | Age: 76
Discharge: HOME | End: 2021-03-05
Payer: MEDICARE

## 2021-03-05 PROCEDURE — 97022 WHIRLPOOL THERAPY: CPT

## 2021-03-05 PROCEDURE — 97110 THERAPEUTIC EXERCISES: CPT

## 2021-03-05 PROCEDURE — 97140 MANUAL THERAPY 1/> REGIONS: CPT

## 2021-03-05 NOTE — PROGRESS NOTES
OT DAILY TREATMENT NOTE     Patient Name: Charito Alamo  Date:3/5/2021  : 1945  [x]  Patient  Verified  Payor: [place] MED[place]ARE / Plan: [place] MEDICARE PART A & B / Product Type: Medicare /    In WRNT:1328  Out time:926  Total Treatment Time (min): 41  Visit #:5 of 8    Medicare/BCBS Only   Total Timed Codes (min):  26 1:1 Treatment Time: 41     Treatment Area: Right wrist pain [M25.531]  Pain in right hand [M79.641]    SUBJECTIVE  Pain Level (0-10 scale): 1/10  Any medication changes, allergies to medications, adverse drug reactions, diagnosis change, or new procedure performed?: [x] No    [] Yes (see summary sheet for update)  Subjective functional status/changes:   [] No changes reported  \"It was hurting bad yesterday, but it felt a lot better last night\"    OBJECTIVE    Modality rationale: decrease pain and increase tissue extensibility to improve the patients ability to improve pain free use of right hand   Min Type Additional Details    [] Estim:  []Unatt       []IFC  []Premod                        []Other:  []w/ice   []w/heat  Position:  Location:    [] Estim: []Att    []TENS instruct  []NMES                    []Other:  []w/US   []w/ice   []w/heat  Position:  Location:    []  Traction: [] Cervical       []Lumbar                       [] Prone          []Supine                       []Intermittent   []Continuous Lbs:  [] before manual  [] after manual    []  Ultrasound: []Continuous   [] Pulsed                           []1MHz   []3MHz W/cm2:  Location:    []  Iontophoresis with dexamethasone         Location: [] Take home patch   [] In clinic   15 []  Ice     [x]  heat Fluidotherapy  []  Ice massage  []  Laser   []  Paraffin Position: AROM  Location: right hand and wrist    []  Laser with stim  []  Other:  Position:  Location:    []  Vasopneumatic Device Pressure:       [] lo [] med [] hi   Temperature: [] lo [] med [] hi       [x] Skin assessment post-treatment:  []intact [x]redness- no adverse reaction    []redness  adverse reaction:     16 min Therapeutic Exercise:  [x] See flow sheet :   Rationale: increase strength and improve coordination to improve the patients ability to use right hand for yardwork    10 min Manual Therapy:  IASTM #4,6 using fanning and sweeping techniques   The manual therapy interventions were performed at a separate and distinct time from the therapeutic activities interventions. Rationale: decrease pain to improve pt's tolerance for sustained activity    With   [x] TE   [] TA   [] neuro   [] other: Patient Education: [x] Review HEP    [] Progressed/Changed HEP based on:   [] positioning   [] body mechanics   [] transfers   [] heat/ice application   [] Splint wear/care   [] Sensory re-education   [] scar management      [] other:            Other Objective/Functional Measures:   Strength:     Measurements: Taken with Flo Dynamometer, in Lbs   Level 2 2/12/2021    3/5/2021   Date Date Date Date Date   Right 33lbs  45             Left 66lbs               Deficit                 Change                       Pinch Measurements: Taken with Pinch Gauge, in Lbs   (hand) 2/12/2021    3/5/2021   Date Date Date Date   Lateral                Right 19             Left  24             Deficit               Change               Pad               Right 10  10           Left 14             Deficit               Change               Fernando               Right 11 12            Left 16             Deficit               Change                      Pain Level (0-10 scale) post treatment: 1/10    ASSESSMENT/Changes in Function: Based on dynamometer measurements taken cold pt demos 12# improvement of right  strength, and slight improvement with fernando pinch. Pt reports pain with prolonged use, however, it quickly decreases with rest breaks.     Patient will continue to benefit from skilled OT services to modify and progress therapeutic interventions, address ROM deficits, address strength deficits and instruct in home and community integration to attain remaining goals. [x]  See Plan of Care  []  See progress note/recertification  []  See Discharge Summary         Progress towards goals / Updated goals:  Short Term Goals: To be accomplished in 2  weeks:  1. Patient will be compliant with initial home exercise program to take an active role in their rehabilitation process. Status at [facility]: issued initial HEP  2/24/21: Goal met. Pt compliant with initial HEP.     2. Patient will demonstrate a good understanding of their condition and strategies for self-management. Status at [facility]: educated on sleep positioning, edema management  2/24/21: Pt reports forgetting to perform scar massage. Re-educated on scar massage techniques. 3/5/21: Goal met.     Long Term Goals: To be accomplished in 4 weeks:              1. Pt will have 60 pounds of  in the right hand to allow for functional grasp for all ADL activities including dressing, bathing and self care. Status at [facility]: 33 lbs   2/26/21: progressing with strength tasks in clinic. 3/5/21: 45#     2. Patient will have improved right tip pinch strength to 14 pounds in order to perform fine motor tasks such as zippiing up zippers or opening containers. Status at [facility]: 10 lbs  3/5/21: 10#     3. Patient will have improved right fernando pinch strength of 15 pounds in order to perform fine motor tasks such as writing. Status at [facility]: 11 lbs  3/5/21:12#     4. Patient will report pain no greated than 2/10 in right hand after 20 minutes of sustained activity. Status at [facility]: 4/10  2/26/2021: 2/10 after 13 min of exercises     5. Patient will have 25% improvement in right thumb parasthesias to improve his ability to use right hand to open small packages. Status at [facility]: numbness in right thumb  2/24/21: approx 50% improvement.  Goal met     6 Patient will show a 5 point improvement on FOTO functional status measure to improve overall functional performance.   Status at Eval: 64    PLAN  [x]  Upgrade activities as tolerated     [x]  Continue plan of care  []  Update interventions per flow sheet       []  Discharge due to:_  []  Other:_      Mariann Melgar OTR/L 3/5/2021  8:46 AM    Future Appointments   Date Time Provider Sander Sharona   3/10/2021  8:[facility]   3/12/2021  8:00 AM Seth Gonzalez OT [facility]   3/17/2021  8:00 AM Seth Gonzalez OT [facility]   4/12/2021  8:15 AM Cesario Abreu, DO [facility]

## 2021-03-10 ENCOUNTER — HOSPITAL ENCOUNTER (OUTPATIENT)
Dept: PHYSICAL THERAPY | Age: 76
Discharge: HOME | End: 2021-03-10
Payer: MEDICARE

## 2021-03-10 PROCEDURE — 97110 THERAPEUTIC EXERCISES: CPT

## 2021-03-10 PROCEDURE — 97022 WHIRLPOOL THERAPY: CPT

## 2021-03-10 PROCEDURE — 97140 MANUAL THERAPY 1/> REGIONS: CPT

## 2021-03-10 NOTE — PROGRESS NOTES
OT DAILY TREATMENT NOTE     Patient Name: Brandon Escobar  Date:3/10/2021  : 1945  [x]  Patient  Verified  Payor: Jolynn Churchill / Plan: [place] MEDICARE PART A & B / Product Type: Medicare /    In time:8:02 AM  Out time:8:45 AM  Total Treatment Time (min): 43  Visit #: 6 of 8    Medicare/BCBS Only   Total Timed Codes (min):  28 1:1 Treatment Time:  43     Treatment Area: Right wrist pain [M25.531]  Pain in right hand [M79.641]    SUBJECTIVE  Pain Level (0-10 scale): 1/10  Any medication changes, allergies to medications, adverse drug reactions, diagnosis change, or new procedure performed?: [x] No    [] Yes (see summary sheet for update)  Subjective functional status/changes:   [] No changes reported  \"It's getting better. \"    OBJECTIVE    Modality rationale: decrease pain and increase tissue extensibility to improve the patients ability to move right wrist and hand   Min Type Additional Details    [] Estim:  []Unatt       []IFC  []Premod                        []Other:  []w/ice   []w/heat  Position:  Location:    [] Estim: []Att    []TENS instruct  []NMES                    []Other:  []w/US   []w/ice   []w/heat  Position:  Location:    []  Traction: [] Cervical       []Lumbar                       [] Prone          []Supine                       []Intermittent   []Continuous Lbs:  [] before manual  [] after manual    []  Ultrasound: []Continuous   [] Pulsed                           []1MHz   []3MHz W/cm2:  Location:    []  Iontophoresis with dexamethasone         Location: [] Take home patch   [] In clinic   15 []  Ice     [x]  Heat - fluidotherapy  []  Ice massage  []  Laser   []  Paraffin Position: AROM  Location: right wrist and hand    []  Laser with stim  []  Other:  Position:  Location:    []  Vasopneumatic Device Pressure:       [] lo [] med [] hi   Temperature: [] lo [] med [] hi       [x] Skin assessment post-treatment:  [x]intact []redness- no adverse reaction    []redness  adverse reaction:     18 min Therapeutic Exercise:  [x] See flow sheet :   Rationale: increase ROM, increase strength, improve coordination and increase proprioception to improve the patients ability to grasp,  and pinch    10 min Manual Therapy:  IASTM using tools #4, 6 using sweeping and fanning techniques   The manual therapy interventions were performed at a separate and distinct time from the therapeutic activities interventions. Rationale: decrease pain, increase ROM, increase tissue extensibility and decrease edema  to right wrist, palm and thumb    With   [] TE   [] TA   [] neuro   [] other: Patient Education: [x] Review HEP    [] Progressed/Changed HEP based on:   [] positioning   [] body mechanics   [] transfers   [] heat/ice application   [] Splint wear/care   [] Sensory re-education   [] scar management      [] other:            Other Objective/Functional Measures: improving functional use     Pain Level (0-10 scale) post treatment: 0/10    ASSESSMENT/Changes in Function: Reduced pain and symptoms with functional use. Progressing towards goals. Patient will continue to benefit from skilled OT services to modify and progress therapeutic interventions, address strength deficits, analyze and address soft tissue restrictions and instruct in home and community integration to attain remaining goals. []  See Plan of Care  []  See progress note/recertification  []  See Discharge Summary         Progress towards goals / Updated goals:  Short Term Goals: To be accomplished in 2  weeks:  1. Patient will be compliant with initial home exercise program to take an active role in their rehabilitation process. Status at [facility]: issued initial HEP  2/24/21: Goal met. Pt compliant with initial HEP.     2. Patient will demonstrate a good understanding of their condition and strategies for self-management. Status at [facility]: educated on sleep positioning, edema management  2/24/21: Pt reports forgetting to perform scar massage. Re-educated on scar massage techniques. 3/5/21: Goal met.     Long Term Goals: To be accomplished in 4 weeks:              1. Pt will have 60 pounds of  in the right hand to allow for functional grasp for all ADL activities including dressing, bathing and self care. Status at Eval: 33 lbs   2/26/21: progressing with strength tasks in clinic. 3/5/21: 45#     2. Patient will have improved right tip pinch strength to 14 pounds in order to perform fine motor tasks such as zippiing up zippers or opening containers. Status at Eval: 10 lbs  3/5/21: 10#     3. Patient will have improved right fernando pinch strength of 15 pounds in order to perform fine motor tasks such as writing. Status at Eval: 11 lbs  3/5/21:12#     4. Patient will report pain no greated than 2/10 in right hand after 20 minutes of sustained activity. Status at Eval: 4/10  2/26/2021: 2/10 after 13 min of exercises  3/10/2021 - 0/10 pain level after 18 min of therex     5. Patient will have 25% improvement in right thumb parasthesias to improve his ability to use right hand to open small packages. Status at Eval: numbness in right thumb  2/24/21: approx 50% improvement. Goal met     6 Patient will show a 5 point improvement on FOTO functional status measure to improve overall functional performance.   Status at Eval: 64    PLAN  []  Upgrade activities as tolerated     [x]  Continue plan of care  []  Update interventions per flow sheet       []  Discharge due to:_  []  Other:_      Bryant Russell OT 3/10/2021  8:01 AM    Future Appointments   Date Time Provider Sander Tabor   3/12/2021  8:00 [facility]   3/17/2021  8:00 AM Daniel Bradley OT [facility]   4/12/2021  8:15 AM Cesario Morris, DO [facility]

## 2021-03-12 ENCOUNTER — HOSPITAL ENCOUNTER (OUTPATIENT)
Dept: PHYSICAL THERAPY | Age: 76
Discharge: HOME | End: 2021-03-12
Payer: MEDICARE

## 2021-03-12 PROCEDURE — 97110 THERAPEUTIC EXERCISES: CPT

## 2021-03-12 PROCEDURE — 97022 WHIRLPOOL THERAPY: CPT

## 2021-03-12 NOTE — PROGRESS NOTES
[facility] [address] [address] [address]  [place], [address]  [phone] fax  Patient Name: Jesús Ricardo  Date:3/12/2021  : 1945  [x]  Patient  Verified      Hand Therapy/ Occupational Therapy Discharge Instructions        Continue with home exercise program for 3 times a day for 4 weeks then decrease to 1 times a day as needed/patient discretion. Continue to apply heat as needed per day. Follow up with MD: 2021      Recommendations: __x__ Return to activity with home program            ____ Return to activity with the following modifcations                       ____ Post Rehab Program                                  ____Return to/Join local gym    Additional comments:          Please call with any questions or concerns. Thank you for your participation.          Indra Moe OT 3/12/2021  8:31 AM

## 2021-03-12 NOTE — PROGRESS NOTES
OT DISCHARGE DAILY NOTE AND SUMMARY- [facility]     Date:3/12/2021  Patient name: Christophe Lombardi Start of Care: 2021   Referral source: Bobby Lou DO : 1945   Medical/Treatment Diagnosis: Right wrist pain [M25.531]  Pain in right hand [M79.641] Onset Date:12/10/2020     Prior Hospitalization: see medical history Provider#: [id number]   Medications: Verified on Patient Summary List     Comorbidities: arthritis, depression, cancer, alcohol use, hearing deficits, vision deficits, hypertension   Prior Level of Function: Independent without limitations in I/ADLs. Visits from Start of Care: 7    Missed Visits: 1    Reporting Period: 2021 to 3/12/2021      [x]  Patient  Verified  Payor: Leilani Yu / Plan: [place] MEDICARE PART A & B / Product Type: Medicare /    In time:8:00 AM  Out time:8:44 AM  Total Treatment Time (min): 44  Visit #: 7 of 8    Medicare/BCBS Only   Total Timed Codes (min):  29 1:1 Treatment Time:  44       Treatment Area: Right wrist pain [M25.531]  Pain in right hand [M79.641]    SUBJECTIVE  Pain Level (0-10 scale): 0/10  Any medication changes, allergies to medications, adverse drug reactions, diagnosis change, or new procedure performed?: [x] No    [] Yes (see summary sheet for update)  Subjective functional status/changes:   [] No changes reported  \"I haven't been having pain in the wrist it has been my arthritis. I pretty much can do whatever I need to do. \"    OBJECTIVE    Modality rationale: decrease pain and increase tissue extensibility to improve the patients ability to move right wrist and hand   Min Type Additional Details    [] Estim:  []Unatt       []IFC  []Premod                        []Other:  []w/ice   []w/heat  Position:  Location:    [] Estim: []Att    []TENS instruct  []NMES                    []Other:  []w/US   []w/ice   []w/heat  Position:  Location:    []  Traction: [] Cervical       []Lumbar                       [] Prone          []Supine []Intermittent   []Continuous Lbs:  [] before manual  [] after manual    []  Ultrasound: []Continuous   [] Pulsed                           []1MHz   []3MHz W/cm2:  Location:    []  Iontophoresis with dexamethasone         Location: [] Take home patch   [] In clinic   15 []  Ice     [x]  Heat - fluidotherapy  []  Ice massage  []  Laser   []  Anodyne Position: AROM  Location: right hand    []  Laser with stim  []  Other:  Position:  Location:    []  Vasopneumatic Device Pressure:       [] lo [] med [] hi   Temperature: [] lo [] med [] hi   [x] Skin assessment post-treatment:  [x]intact []redness- no adverse reaction    []redness  adverse reaction:     29 min Therapeutic Exercise:  [x] See flow sheet :   Rationale: increase ROM, increase strength, improve coordination and increase proprioception to improve the patients ability to return to full functional use of right UE.      With   [] TE   [] TA   [] neuro   [] other: Patient Education: [x] Review HEP    [] Progressed/Changed HEP based on:   [] positioning   [] body mechanics   [] transfers   [] heat/ice application   [] Splint wear/care   [] Sensory re-education   [] scar management      [] other:            Other Objective/Functional Measures:  Strength:     Measurements: Taken with Flo Dynamometer, in Lbs   Level 2 2/12/2021    3/5/2021    3/12/2021    Date Date Date Date   Right 33lbs  45  54           Left 66lbs               Deficit                 Change                       Pinch Measurements: Taken with Pinch Gauge, in Lbs   (hand) 2/12/2021    3/5/2021    3/12/2021    Date Date Date   Lateral                Right 19             Left  24             Deficit               Change               Pad               Right 10  10 15          Left 14             Deficit               Change               Lawrence               Right 11 12   15         Left 16             Deficit               Change                    Pain Level (0-10 scale) post treatment: 0/10    Summary of Care:  Short Term Goals: To be accomplished in 2  weeks:  1. Patient will be compliant with initial home exercise program to take an active role in their rehabilitation process. Status at [facility]: issued initial HEP  2/24/21: Goal met. Pt compliant with initial HEP.     2. Patient will demonstrate a good understanding of their condition and strategies for self-management. Status at [facility]: educated on sleep positioning, edema management  2/24/21: Pt reports forgetting to perform scar massage. Re-educated on scar massage techniques. 3/5/21: Goal met.     Long Term Goals: To be accomplished in 4 weeks:              1. Pt will have 60 pounds of  in the right hand to allow for functional grasp for all ADL activities including dressing, bathing and self care. Status at [facility]: 33 lbs   2/26/21: progressing with strength tasks in clinic. 3/5/21: 45#  Status at d/c 3/12/2021 - 54 progressing, goal not met     2. Patient will have improved right tip pinch strength to 14 pounds in order to perform fine motor tasks such as zippiing up zippers or opening containers. Status at [facility]: 10 lbs  3/5/21: 10#  Status at d/c 3/12/2021 - 15# goal met      3. Patient will have improved right fernando pinch strength of 15 pounds in order to perform fine motor tasks such as writing. Status at [facility]: 11 lbs  3/5/21:12#  Status at d/c 3/12/2021 - 15# goal met     4. Patient will report pain no greated than 2/10 in right hand after 20 minutes of sustained activity. Status at [facility]: 4/10  2/26/2021: 2/10 after 13 min of exercises  3/10/2021 - 0/10 pain level after 18 min of therex  Status at d/c 3/12/2021 - 29 minutes 0/10 pain level, goal met      5. Patient will have 25% improvement in right thumb parasthesias to improve his ability to use right hand to open small packages. Status at [facility]: numbness in right thumb  2/24/21: approx 50% improvement.  Goal met     6 Patient will show a 5 point improvement on FOTO functional status measure to improve overall functional performance. Status at Eval: 61  Status at d/c 3/12/2021 - 74 (+13), goal met     ASSESSMENT/Changes in Function: Pt continues to demonstrate improved  and pinch strength of the right hand. He also reports and demonstrates improved functional use with his daily activities. He has been provided with d/c instructions to continue HEP for strengthening with use of graded therapy putty provided by therapist.  Pt agrees to d/c at this time.  Thank you for this referral.     PLAN:  [x]Discontinue therapy: [x]Patient has reached or is progressing toward set goals      []Patient is non-compliant or has abdicated      []Due to lack of appreciable progress towards set goals    Thank you for this referral!    Dexter Sarabia OT 3/12/2021 8:27 AM

## 2021-03-17 ENCOUNTER — APPOINTMENT (OUTPATIENT)
Dept: PHYSICAL THERAPY | Age: 76
End: 2021-03-17
Payer: MEDICARE

## 2021-04-12 ENCOUNTER — OFFICE VISIT (OUTPATIENT)
Dept: ORTHOPEDIC SURGERY | Age: 76
End: 2021-04-12
Payer: MEDICARE

## 2021-04-12 VITALS
RESPIRATION RATE: 16 BRPM | WEIGHT: 157 LBS | HEIGHT: 68 IN | HEART RATE: 60 BPM | BODY MASS INDEX: 23.79 KG/M2 | OXYGEN SATURATION: 99 %

## 2021-04-12 DIAGNOSIS — M65.311 TRIGGER THUMB OF RIGHT HAND: ICD-10-CM

## 2021-04-12 DIAGNOSIS — Z98.890 S/P CARPAL TUNNEL RELEASE: ICD-10-CM

## 2021-04-12 DIAGNOSIS — G56.02 LEFT CARPAL TUNNEL SYNDROME: ICD-10-CM

## 2021-04-12 DIAGNOSIS — Z98.890 S/P TRIGGER FINGER RELEASE: ICD-10-CM

## 2021-04-12 DIAGNOSIS — G56.01 SEVERE CARPAL TUNNEL SYNDROME, RIGHT: Primary | ICD-10-CM

## 2021-04-12 PROCEDURE — 3017F COLORECTAL CA SCREEN DOC REV: CPT | Performed by: ORTHOPAEDIC SURGERY

## 2021-04-12 PROCEDURE — G8756 NO BP MEASURE DOC: HCPCS | Performed by: ORTHOPAEDIC SURGERY

## 2021-04-12 PROCEDURE — G8536 NO DOC ELDER MAL SCRN: HCPCS | Performed by: ORTHOPAEDIC SURGERY

## 2021-04-12 PROCEDURE — G8510 SCR DEP NEG, NO PLAN REQD: HCPCS | Performed by: ORTHOPAEDIC SURGERY

## 2021-04-12 PROCEDURE — G8427 DOCREV CUR MEDS BY ELIG CLIN: HCPCS | Performed by: ORTHOPAEDIC SURGERY

## 2021-04-12 PROCEDURE — G8420 CALC BMI NORM PARAMETERS: HCPCS | Performed by: ORTHOPAEDIC SURGERY

## 2021-04-12 PROCEDURE — 1101F PT FALLS ASSESS-DOCD LE1/YR: CPT | Performed by: ORTHOPAEDIC SURGERY

## 2021-04-12 PROCEDURE — 99213 OFFICE O/P EST LOW 20 MIN: CPT | Performed by: ORTHOPAEDIC SURGERY

## 2021-07-13 NOTE — PROGRESS NOTES
1. Have you been to the ER, urgent care clinic since your last visit? Hospitalized since your last visit? No    2. Have you seen or consulted any other health care providers outside of the [address] [place] [address] since your last visit? Include any pap smears or colon screening.  No Patient ID: Rell Jones is a 57 y o  female Date of Birth 1972       Chief Complaint   Patient presents with    Follow Up Wound Care Visit     Dressing intact       Allergies:  Clindamycin    Diagnosis:   Diagnosis ICD-10-CM Associated Orders   1  Diabetic ulcer of left midfoot associated with diabetes mellitus due to underlying condition, with fat layer exposed ([address] )  T20 787 Wound cleansing and dressings    L97 422    2  Controlled type 2 diabetes mellitus with diabetic neuropathy, with long-term current use of insulin ([facility])  E11 40 Wound cleansing and dressings    Z79 4 lidocaine (LMX) 4 % cream   3  Diabetic ulcer of right midfoot associated with diabetes mellitus due to underlying condition, limited to breakdown of skin ([facility])  E31 539 Wound cleansing and dressings    L97 411 lidocaine (LMX) 4 % cream        Assessment :    Diabetic foot ulcer on the left amputation and stump  Apligraf #4 applied today  Plan:   Apligraf application  See orders  Continued offload  Subjective:     7/13/21: Patient of Dr Paul Lundberg  Patient is here for ulceration on the stump of her left foot  Has been getting Apligraf is and now for #4 today  No complaints  Denies pain, fever or chills          The following portions of the patient's history were reviewed and updated as appropriate:   Patient Active Problem List   Diagnosis    Open wound of left foot    Controlled type 2 diabetes mellitus with neurologic complication, with long-term current use of insulin ([address] )    Diabetic ketoacidosis without coma associated with type 1 diabetes mellitus ([address] )    Non-healing wound of left foot    Acute kidney injury ([address] )    Diabetic ulcer of right midfoot associated with diabetes mellitus due to underlying condition, limited to breakdown of skin ([address] )    Morbid obesity ([address] )    Acute on chronic anemia    PICC (peripherally inserted central catheter) in place    Acute blood loss as cause of postoperative anemia    S/P amputation    Cellulitis of left foot    Diabetic ulcer of left midfoot associated with diabetes mellitus due to underlying condition, with fat layer exposed ([facility] )     Past Medical History:   Diagnosis Date    Charcot's joint of foot, left     Diabetes mellitus ([facility] )     Osteomyelitis of foot, right, acute ([facility] )      Past Surgical History:   Procedure Laterality Date    FOOT AMPUTATION Left 10/30/2020    Procedure: CHOPART AMPUTATION LEFT  FOOT:;  Surgeon: Roxie Watts DPM;  Location:  MAIN OR;  Service: Podiatry    FOOT CAPSULE RELEASE W/ PERCUTANEOUS HEEL CORD LENGTHENING, TIBIAL TENDON TRANSFER Left 10/30/2020    Procedure: Achilles tenotomy left foot;  Surgeon: Roxie Watts DPM;  Location:  MAIN OR;  Service: Podiatry    FOOT SURGERY Right     right first toe amputation  2019     Family History   Problem Relation Age of Onset    Diabetes Brother     Hyperlipidemia Brother     Hypertension Brother     Diabetes Mother     Hypertension Father     Diabetes Sister      Social History     Socioeconomic History    Marital status: /Civil Union     Spouse name: None    Number of children: None    Years of education: None    Highest education level: None   Occupational History    None   Tobacco Use    Smoking status: Never Smoker    Smokeless tobacco: Never Used   Vaping Use    Vaping Use: Never used   Substance and Sexual Activity    Alcohol use: Never    Drug use: Never    Sexual activity: Not Currently   Other Topics Concern    None   Social History Narrative    None     Social Determinants of Health     Financial Resource Strain:     Difficulty of Paying Living Expenses:    Food Insecurity:     Worried About Running Out of Food in the Last Year:     Ran Out of Food in the Last Year:    Transportation Needs:     Lack of Transportation (Medical):      Lack of Transportation (Non-Medical):    Physical Activity:     Days of Exercise per Week:     Minutes of Exercise per Session:    Stress:     Feeling of Stress :    Social Connections:     Frequency of Communication with Friends and Family:     Frequency of Social Gatherings with Friends and Family:     Attends Denominational Services:     Active Member of Clubs or Organizations:     Attends Club or Organization Meetings:     Marital Status:    Intimate Partner Violence:     Fear of Current or Ex-Partner:     Emotionally Abused:     Physically Abused:     Sexually Abused:        Current Outpatient Medications:     ACCU-CHEK FASTCLIX LANCETS MISC, by Does not apply route, Disp: , Rfl:     gabapentin (NEURONTIN) 300 mg capsule, Take 1 capsule (300 mg total) by mouth 3 (three) times a day, Disp: 90 capsule, Rfl: 0    INSULIN ASPART FLEXPEN SC, Inject 30 Units under the skin 3 (three) times a day 30 units with every meal and an additional sliding scale based on blood glucose, Disp: , Rfl:     insulin glargine (LANTUS) 100 units/mL subcutaneous injection, Continue prior to admission dose (Patient taking differently: 57 Units daily at bedtime Continue prior to admission dose), Disp: 2000 Units, Rfl: 0    polyethylene glycol (MIRALAX) 17 g packet, Take 17 g by mouth daily, Disp: , Rfl:     Syringe/Needle, Disp, 30G X 1/2" 1 ML MISC, by Does not apply route, Disp: , Rfl:   No current facility-administered medications for this visit  Review of Systems   Constitutional: Negative for appetite change, chills, fatigue, fever and unexpected weight change  HENT: Negative for congestion, hearing loss and postnasal drip  Respiratory: Negative for cough and shortness of breath  Cardiovascular: Negative for leg swelling  Musculoskeletal: Negative for gait problem  Skin: Positive for wound (Left foot)  Negative for rash  Neurological: Negative for numbness  Hematological: Does not bruise/bleed easily  Psychiatric/Behavioral: Negative            Objective:  /68   Pulse 70   Temp 98 3 °F (36 8 °C) Resp 18   Pain Score: 0-No pain     Physical Exam  Constitutional:       General: She is awake  Feet:      Left foot:      Skin integrity: Ulcer present  No erythema  Comments: Wound is full thickness with fibrogranular base  No active drainage  No periwound erythema or edema  There is some surrounding callus formation and minimal undermining  No signs of an infection  No malodor  Neurological:      Mental Status: She is alert  Sensory: Sensory deficit present  Psychiatric:         Behavior: Behavior is cooperative  Wound 04/13/21 Diabetic Ulcer Foot Left (Active)   Wound Image   07/13/21 1541   Wound Description Pink; Beefy red 07/13/21 1541   Ursula-wound Assessment Callus;Clean;Dry;Yellow-brown 07/13/21 1541   Wound Length (cm) 1 8 cm 07/13/21 1541   Wound Width (cm) 1 8 cm 07/13/21 1541   Wound Depth (cm) 0 2 cm 07/13/21 1541   Wound Surface Area (cm^2) 3 24 cm^2 07/13/21 1541   Wound Volume (cm^3) 0 648 cm^3 07/13/21 1541   Calculated Wound Volume (cm^3) 0 65 cm^3 07/13/21 1541   Change in Wound Size % 95 95 07/13/21 1541   Undermining 1 5 04/13/21 1320   Undermining is depth extending from 1-7 04/13/21 1320   Drainage Amount Moderate 07/13/21 1541   Drainage Description Yellow;Brown 07/13/21 1541   Non-staged Wound Description Full thickness 07/13/21 1541   Treatments Irrigation with NSS 07/13/21 1541   Dressing Changed Changed 06/08/21 1540   Patient Tolerance Tolerated well 07/13/21 1541                          Skin Substitute   Universal Protocol:  Consent: Verbal consent obtained  Consent given by: patient  Time out: Immediately prior to procedure a "time out" was called to verify the correct patient, procedure, equipment, support staff and site/side marked as required  Patient understanding: patient states understanding of the procedure being performed  Patient identity confirmed: verbally with patient    Performed by: Physician  Product: Apligraf      Application Location and Measurements  Location: genitalia / hands / feet / multiple digits  Skin Sub Lot #: [id number]  Skin Sub Expiration: 2021-07-15  Area (sq cm): 3 3  Product Applied (sq cm): 11  Product Wasted (sq cm): 33    Application Details  Fenestrated: Yes  Instrument: blade  Secured: Yes  Secured With: Steri-Strips  Dressing Applied: Yes  Dressing Comments: veil/alginate/foam  Procedural pain (0-10): 0  Post-procedural pain: 0   Response to treatment: procedure was tolerated well     NSS  NSS Lot #: [id number]   NSS Expiration: 2 29 24   Comments: Apligraf was prepared, applied and affixed  MARYSOL was within range  Non smoker  Multilayer compression wrap  Wound debridement was performed at this treament visit as a courtesy  E&M of contributory conditions or other factors affecting wound healing have been addressed  The patient appears compliant with the wound care treatment plan  The patient was instructed on post procedure care  No infection, tendon or muscle or joint capsule or bone involvement noted  The patient does not smoke  Results from last 6 Months   Lab Units 04/13/21  1439   WOUND CULTURE  3+ Growth of Morganella morganii*  2+ Growth of Citrobacter freundii*  2+ Growth of Beta Hemolytic Streptococcus Group B*  2+ Growth of          Wound Instructions:  Orders Placed This Encounter   Procedures    Wound cleansing and dressings     Wash your hands with soap and water  Ledell Pock old dressing, discard into plastic bag and place in trash   Cleanse the wound with soap (dove unscented) and water prior to applying a clean dressing  Do not use tissue or cotton balls  Do not scrub the wound   Pat dry using gauze        Shower no          Apligraf applied covered with wound veil then silver alginate  and secured with steri-strips cover with foam and secure with lilian and tape  Do not remove until next visit    Off-loading Instructions:       Keep weight and pressure off wound at all times and Wear off-loading device as directed by your physician    Apligraf [id number] 02 1A exp:7/15/21  :06 30  PH7 3-7 5  Saline [id number] Exp:2/29/24  [id number]     Standing Status:   Future     Standing Expiration Date:   7/13/2022    Skin Substitute     This order was created via procedure documentation       Amauri Reyes MD, CHT, CWS       Portions of the record may have been created with voice recognition software  Occasional wrong word or "sound alike" substitutions may have occurred due to the inherent limitations of voice recognition software  Read the chart carefully and recognize, using context, where substitutions have occurred

## 2021-07-28 ENCOUNTER — HOSPITAL ENCOUNTER (OUTPATIENT)
Dept: PHYSICAL THERAPY | Age: 76
Discharge: HOME | End: 2021-07-28
Payer: MEDICARE

## 2021-07-28 PROCEDURE — 97161 PT EVAL LOW COMPLEX 20 MIN: CPT

## 2021-07-28 NOTE — PROGRESS NOTES
PT DAILY TREATMENT NOTE 10-18    Patient Name: Rene Justin  Date:2021  : 1945  [x]  Patient  Verified  Payor: Swati Peaks / Plan: [place] & [place] / Product Type: Medicare /    In time:900  Out time:923  Total Treatment Time (min): 23  Visit #: 1 of 1    Medicare/BCBS Only   Total Timed Codes (min):  0 1:1 Treatment Time:  23       Treatment Area: Ankle pain [M25.579]    SUBJECTIVE  Pain Level (0-10 scale): 1  Any medication changes, allergies to medications, adverse drug reactions, diagnosis change, or new procedure performed?: [x] No    [] Yes (see summary sheet for update)  Subjective functional status/changes:   [] No changes reported  See eval    OBJECTIVE  23 min [x]Eval                  []Re-Eval       With   [] TE   [] TA   [] neuro   [] other: Patient Education: [x] Review HEP    [] Progressed/Changed HEP based on:   [] positioning   [] body mechanics   [] transfers   [] heat/ice application    [] other:      Other Objective/Functional Measures:      Pain Level (0-10 scale) post treatment: 1    ASSESSMENT/Changes in Function: see POC       [x]  See Plan of Care  []  See progress note/recertification  []  See Discharge Summary         Progress towards goals / Updated goals:  eval only    PLAN  []  Upgrade activities as tolerated     []  Continue plan of care  []  Update interventions per flow sheet       [x]  Discharge due to:_eval only  []  Other:_      SEVERIANO Cristina, CMTPT 2021  9:42 AM    No future appointments.

## 2021-07-28 NOTE — PROGRESS NOTES
[facility]  [address]  [place], [address]  [phone] fax    Plan of Care/ Statement of Necessity for Physical Therapy Services    Patient name: Stacy Vines Start of Care: 2021   Referral source: Kajal Vogt DPM : 1945    Medical Diagnosis: Ankle pain [M25.579]  Payor: [place] MEDICARE / Plan: [place] MEDICARE PART A & B / Product Type: Medicare /  Onset Date:Chronic    Treatment Diagnosis: Right foot neuroma    Prior Hospitalization: see medical history Provider#: [id number]   Medications: Verified on Patient summary List    Comorbidities: OA; Right knee menisectomy; depression; HTN; CA   Prior Level of Function: Walked for exercise; no limitations      The Plan of Care and following information is based on the information from the initial evaluation. Assessment/ key information: 70y.o. year old male presents with CC of intermittent right foot pain that has been present for over 9 years. Impairments noted today: mild tightness in right gastroc, but does not limit ankle DF, right ankle DF = left ankle DF. Patient's strength and balance are WNL. At this time, I feel that patient is not a candidate for skilled PT.  Thank you for your referral.      Evaluation Complexity History MEDIUM  Complexity : 1-2 comorbidities / personal factors will impact the outcome/ POC ; Examination LOW Complexity : 1-2 Standardized tests and measures addressing body structure, function, activity limitation and / or participation in recreation  ;Presentation LOW Complexity : Stable, uncomplicated  ;Clinical Decision Making MEDIUM Complexity : FOTO score of 26-74  Overall Complexity Rating: LOW   Problem List: pain affecting function   Treatment Plan may include any combination of the following: Patient education  Patient / Family readiness to learn indicated by: asking questions, trying to perform skills and interest  Persons(s) to be included in education: patient (P)  Barriers to Learning/Limitations: None  Patient Goal (s): I don't know I this is going to help  Patient Self Reported Health Status: good  Rehabilitation Potential: fair  Frequency / Duration: Patient to be seen 1 times per week for 1 treatments. Patient/ Caregiver education and instruction: Diagnosis, prognosis, self care   [x]  Plan of care has been reviewed with PTA    Certification Period: 7/28/21-7/28/21  Aylin Oliver, PT 7/28/2021 9:27 AM    ________________________________________________________________________    I certify that the above Therapy Services are being furnished while the patient is under my care. I agree with the treatment plan and certify that this therapy is necessary.     [address] [place] [address] Signature:____________________  Date:____________Time: _________     Seamus Clause, DPM  Please sign and return to [facility] [address] [address] [address]  [place], [address]  [phone] fax

## 2021-09-13 ENCOUNTER — HOSPITAL ENCOUNTER (OUTPATIENT)
Dept: GENERAL RADIOLOGY | Age: 76
Discharge: HOME | End: 2021-09-13
Attending: EMERGENCY MEDICINE
Payer: MEDICARE

## 2021-09-13 ENCOUNTER — VIRTUAL VISIT (OUTPATIENT)
Dept: FAMILY MEDICINE CLINIC | Age: 76
End: 2021-09-13
Attending: EMERGENCY MEDICINE
Payer: MEDICARE

## 2021-09-13 ENCOUNTER — HOSPITAL ENCOUNTER (EMERGENCY)
Age: 76
Discharge: HOME | End: 2021-09-13
Attending: EMERGENCY MEDICINE
Payer: MEDICARE

## 2021-09-13 VITALS
HEIGHT: 69 IN | BODY MASS INDEX: 22.22 KG/M2 | RESPIRATION RATE: 20 BRPM | SYSTOLIC BLOOD PRESSURE: 133 MMHG | OXYGEN SATURATION: 99 % | DIASTOLIC BLOOD PRESSURE: 71 MMHG | TEMPERATURE: 99 F | HEART RATE: 72 BPM | WEIGHT: 150 LBS

## 2021-09-13 DIAGNOSIS — R42 LIGHTHEADEDNESS: Primary | ICD-10-CM

## 2021-09-13 DIAGNOSIS — R07.89 CHEST DISCOMFORT: ICD-10-CM

## 2021-09-13 DIAGNOSIS — R42 DIZZINESS: ICD-10-CM

## 2021-09-13 DIAGNOSIS — Z20.822 SUSPECTED COVID-19 VIRUS INFECTION: ICD-10-CM

## 2021-09-13 DIAGNOSIS — R07.89 CHEST DISCOMFORT: Primary | ICD-10-CM

## 2021-09-13 DIAGNOSIS — R05.9 COUGH: ICD-10-CM

## 2021-09-13 LAB
ANION GAP SERPL CALC-SCNC: 5 MMOL/L (ref 3–18)
ATRIAL RATE: 66 BPM
BASOPHILS # BLD: 0 K/UL (ref 0–0.1)
BASOPHILS NFR BLD: 1 % (ref 0–2)
BUN SERPL-MCNC: 19 MG/DL (ref 7–18)
BUN/CREAT SERPL: 18 (ref 12–20)
CALCIUM SERPL-MCNC: 8.5 MG/DL (ref 8.5–10.1)
CALCULATED R AXIS, ECG10: 22 DEGREES
CALCULATED T AXIS, ECG11: 50 DEGREES
CHLORIDE SERPL-SCNC: 109 MMOL/L (ref 100–111)
CK MB CFR SERPL CALC: NORMAL % (ref 0–4)
CK MB SERPL-MCNC: <1 NG/ML (ref 5–25)
CK SERPL-CCNC: 70 U/L (ref 39–308)
CO2 SERPL-SCNC: 28 MMOL/L (ref 21–32)
CREAT SERPL-MCNC: 1.07 MG/DL (ref 0.6–1.3)
D DIMER PPP FEU-MCNC: 0.39 UG/ML(FEU)
DIAGNOSIS, 93000: NORMAL
DIFFERENTIAL METHOD BLD: ABNORMAL
EOSINOPHIL # BLD: 0.1 K/UL (ref 0–0.4)
EOSINOPHIL NFR BLD: 1 % (ref 0–5)
ERYTHROCYTE [DISTWIDTH] IN BLOOD BY AUTOMATED COUNT: 13.2 % (ref 11.6–14.5)
GLUCOSE SERPL-MCNC: 130 MG/DL (ref 74–99)
HCT VFR BLD AUTO: 40.8 % (ref 36–48)
HGB BLD-MCNC: 13.6 G/DL (ref 13–16)
LYMPHOCYTES # BLD: 1.7 K/UL (ref 0.9–3.6)
LYMPHOCYTES NFR BLD: 29 % (ref 21–52)
MCH RBC QN AUTO: 31.7 PG (ref 24–34)
MCHC RBC AUTO-ENTMCNC: 33.3 G/DL (ref 31–37)
MCV RBC AUTO: 95.1 FL (ref 78–100)
MONOCYTES # BLD: 0.6 K/UL (ref 0.05–1.2)
MONOCYTES NFR BLD: 11 % (ref 3–10)
NEUTS SEG # BLD: 3.3 K/UL (ref 1.8–8)
NEUTS SEG NFR BLD: 59 % (ref 40–73)
P-R INTERVAL, ECG05: 112 MS
PLATELET # BLD AUTO: 229 K/UL (ref 135–420)
PMV BLD AUTO: 11.3 FL (ref 9.2–11.8)
POTASSIUM SERPL-SCNC: 4 MMOL/L (ref 3.5–5.5)
Q-T INTERVAL, ECG07: 390 MS
QRS DURATION, ECG06: 74 MS
QTC CALCULATION (BEZET), ECG08: 408 MS
RBC # BLD AUTO: 4.29 M/UL (ref 4.35–5.65)
SARS-COV-2, COV2: NORMAL
SODIUM SERPL-SCNC: 142 MMOL/L (ref 136–145)
TROPONIN I SERPL-MCNC: <0.02 NG/ML (ref 0–0.04)
VENTRICULAR RATE, ECG03: 66 BPM
WBC # BLD AUTO: 5.7 K/UL (ref 4.6–13.2)

## 2021-09-13 PROCEDURE — 85379 FIBRIN DEGRADATION QUANT: CPT

## 2021-09-13 PROCEDURE — 93005 ELECTROCARDIOGRAM TRACING: CPT

## 2021-09-13 PROCEDURE — 82553 CREATINE MB FRACTION: CPT

## 2021-09-13 PROCEDURE — 99442 PR PHYS/QHP TELEPHONE EVALUATION 11-20 MIN: CPT | Performed by: FAMILY MEDICINE

## 2021-09-13 PROCEDURE — 85025 COMPLETE CBC W/AUTO DIFF WBC: CPT

## 2021-09-13 PROCEDURE — 71046 X-RAY EXAM CHEST 2 VIEWS: CPT

## 2021-09-13 PROCEDURE — 80048 BASIC METABOLIC PNL TOTAL CA: CPT

## 2021-09-13 PROCEDURE — 99283 EMERGENCY DEPT VISIT LOW MDM: CPT

## 2021-09-13 PROCEDURE — U0003 INFECTIOUS AGENT DETECTION BY NUCLEIC ACID (DNA OR RNA); SEVERE ACUTE RESPIRATORY SYNDROME CORONAVIRUS 2 (SARS-COV-2) (CORONAVIRUS DISEASE [COVID-19]), AMPLIFIED PROBE TECHNIQUE, MAKING USE OF HIGH THROUGHPUT TECHNOLOGIES AS DESCRIBED BY CMS-2020-01-R: HCPCS

## 2021-09-13 NOTE — ED TRIAGE NOTES
Patient c/o shortness of breath, non-productive cough, and dizziness x 10 days. States having virtual visit with Dr. Nacho Rice and was advised to come to ER for further evaluation.

## 2021-09-13 NOTE — ED PROVIDER NOTES
EMERGENCY DEPARTMENT HISTORY AND PHYSICAL EXAM    3:58 PM  Date: (Not on file)  Patient Name: Lacie Haney    History of Presenting Illness       History Provided By:     HPI: Lacie Haney is a 78 y.o. male with no past medical hx presents with some chest discomfort on inspiration for a few days. Chest discomfort is mild severity, intermittent, no associated symptoms or modifying factors. Denies any shortness of breath. Patient had some episodes of lightheadedness that has now resolved. patient had 2 doses of Covid vaccine. States that his symptoms may have started after insulating his home. PCP: Jose Kramer MD    Past History     Past Medical History:  Past Medical History:   Diagnosis Date    Basal cell carcinoma     BPH (benign prostatic hyperplasia)     Cancer ([facility] [place] [address]     Basal Cell Carcinoma    Depression     Gastrointestinal disorder     Diverticulitis    GERD (gastroesophageal reflux disease)     History of colon polyps 2/21/2019    Hypertension     Inguinal hernia     left side    Other ill-defined conditions(799.89)     BPH    Psychiatric disorder     Aggression, Depression       Past Surgical History:  Past Surgical History:   Procedure Laterality Date    COLONOSCOPY N/A 2/22/2019    COLONOSCOPY, SURVEILLANCE performed by Oskar Qui[facility]onez MD at [facility] ENDOSCOPY    HX 2 Jae [place] HX COLONOSCOPY      [address]  03/14/2017    HX MALIGNANT SKIN LESION EXCISION         Family History:  Family History   Problem Relation Age of Onset    Prostate Cancer Father        Social History:  Social History     Tobacco Use    Smoking status: Former Smoker    Smokeless tobacco: Never Used    Tobacco comment: quit 1971   Substance Use Topics    Alcohol use:  Yes     Alcohol/week: 1.7 standard drinks     Types: 2 Cans of beer per week     Comment: wine 2-3 x per week with dinner    Drug use: No       Allergies:  No Known Allergies    Review of Systems   Review of Systems Constitutional: Negative for activity change, appetite change and chills. HENT: Negative for congestion, ear discharge, ear pain and sore throat. Eyes: Negative for photophobia and pain. Respiratory: Positive for chest tightness. Negative for cough and choking. Cardiovascular: Negative for palpitations and leg swelling. Gastrointestinal: Negative for anal bleeding and rectal pain. Endocrine: Negative for polydipsia and polyuria. Genitourinary: Negative for genital sores and urgency. Musculoskeletal: Negative for arthralgias and myalgias. Neurological: Negative for dizziness, seizures and speech difficulty. Psychiatric/Behavioral: Negative for hallucinations, self-injury and suicidal ideas. Physical Exam     Patient Vitals for the past 12 hrs:   Temp Pulse Resp BP SpO2   09/13/21 1532 99 °F (37.2 °C) 72 20 133/71 99 %       Physical Exam  Vitals and nursing note reviewed. Constitutional:       Appearance: He is well-developed. HENT:      Head: Normocephalic and atraumatic. Eyes:      General:         Right eye: No discharge. Left eye: No discharge. Cardiovascular:      Rate and Rhythm: Normal rate and regular rhythm. Heart sounds: Normal heart sounds. No murmur heard. Pulmonary:      Effort: Pulmonary effort is normal. No respiratory distress. Breath sounds: Normal breath sounds. No stridor. No wheezing or rales. Chest:      Chest wall: No tenderness. Abdominal:      General: Bowel sounds are normal. There is no distension. Palpations: Abdomen is soft. Tenderness: There is no abdominal tenderness. There is no guarding or rebound. Musculoskeletal:         General: Normal range of motion. Cervical back: Normal range of motion and neck supple. Skin:     General: Skin is warm and dry. Neurological:      Mental Status: He is alert and oriented to person, place, and time.          Diagnostic Study Results     Labs -  Recent Results (from the past 12 hour(s))   SARS-COV-2    Collection Time: 09/13/21  3:37 PM   Result Value Ref Range    SARS-CoV-2 Nasopharyngeal     EKG, 12 LEAD, INITIAL    Collection Time: 09/13/21  3:42 PM   Result Value Ref Range    Ventricular Rate 66 BPM    Atrial Rate 66 BPM    P-R Interval 112 ms    QRS Duration 74 ms    Q-T Interval 390 ms    QTC Calculation (Bezet) 408 ms    Calculated R Axis 22 degrees    Calculated T Axis 50 degrees    Diagnosis       Normal sinus rhythm  Normal ECG  When compared with ECG of 04-DEC-2020 13:35,  No significant change was found  Confirmed by Amanda Tovar ([id number]) on 9/13/2021 5:26:11 PM     CBC WITH AUTOMATED DIFF    Collection Time: 09/13/21  3:51 PM   Result Value Ref Range    WBC 5.7 4.6 - 13.2 K/uL    RBC 4.29 (L) 4.35 - 5.65 M/uL    HGB 13.6 13.0 - 16.0 g/dL    HCT 40.8 36.0 - 48.0 %    MCV 95.1 78.0 - 100.0 FL    MCH 31.7 24.0 - 34.0 PG    MCHC 33.3 31.0 - 37.0 g/dL    RDW 13.2 11.6 - 14.5 %    PLATELET 875 865 - 991 K/uL    MPV 11.3 9.2 - 11.8 FL    NEUTROPHILS 59 40 - 73 %    LYMPHOCYTES 29 21 - 52 %    MONOCYTES 11 (H) 3 - 10 %    EOSINOPHILS 1 0 - 5 %    BASOPHILS 1 0 - 2 %    ABS. NEUTROPHILS 3.3 1.8 - 8.0 K/UL    ABS. LYMPHOCYTES 1.7 0.9 - 3.6 K/UL    ABS. MONOCYTES 0.6 0.05 - 1.2 K/UL    ABS. EOSINOPHILS 0.1 0.0 - 0.4 K/UL    ABS.  BASOPHILS 0.0 0.0 - 0.1 K/UL    DF AUTOMATED     METABOLIC PANEL, BASIC    Collection Time: 09/13/21  3:51 PM   Result Value Ref Range    Sodium 142 136 - 145 mmol/L    Potassium 4.0 3.5 - 5.5 mmol/L    Chloride 109 100 - 111 mmol/L    CO2 28 21 - 32 mmol/L    Anion gap 5 3.0 - 18 mmol/L    Glucose 130 (H) 74 - 99 mg/dL    BUN 19 (H) 7.0 - 18 MG/DL    Creatinine 1.07 0.6 - 1.3 MG/DL    BUN/Creatinine ratio 18 12 - 20      GFR est AA >60 >60 ml/min/1.73m2    GFR est non-AA >60 >60 ml/min/1.73m2    Calcium 8.5 8.5 - 10.1 MG/DL   CARDIAC PANEL,(CK, CKMB & TROPONIN)    Collection Time: 09/13/21  3:51 PM   Result Value Ref Range    CK - MB <1.0 <3.6 ng/ml CK-MB Index  0.0 - 4.0 %     CALCULATION NOT PERFORMED WHEN RESULT IS BELOW LINEAR LIMIT    CK 70 39 - 308 U/L    Troponin-I, QT <0.02 0.0 - 0.045 NG/ML   D DIMER    Collection Time: 09/13/21  3:51 PM   Result Value Ref Range    D DIMER 0.39 <0.46 ug/ml(FEU)       Radiologic Studies -   XR CHEST PA LAT    Result Date: 9/13/2021  No acute cardiopulmonary abnormalities. Small nodules versus nipple shadows. Repeat PA chest radiograph with nipple markers recommended. Medical Decision Making     ED Course: Progress Notes, Reevaluation, and Consults:    3:58 PM Initial assessment performed. The patients presenting problems have been discussed, and they/their family are in agreement with the care plan formulated and outlined with them. I have encouraged them to ask questions as they arise throughout their visit. Provider Notes (Medical Decision Making):   Patient presents with chest discomfort on inspiration for a few days  Vitals within normal limits  Appears well  EKG normal sinus rhythm, no ST changes normal intervals  X-ray chest no acute cardiopulmonary disease  No leukocytosis no electrolyte abnormality  No elevated troponin or D-dimer  No need for repeat troponin since symptoms more than 6 hours  Patient has no complaints on reassessment  Do not suspect cardiac etiology of patient's symptoms  Will be discharged with PMD follow-up. Return precautions given        Vital Signs-Reviewed the patient's vital signs. Reviewed pt's pulse ox reading. Records Reviewed: old medical records  -I am the first provider for this patient.  -I reviewed the vital signs, available nursing notes, past medical history, past surgical history, family history and social history.     Current Outpatient Medications   Medication Sig Dispense Refill    atorvastatin (LIPITOR) 20 mg tablet TAKE 1 TABLET NIGHTLY 30 Tablet 0    finasteride (PROSCAR) 5 mg tablet TAKE 1 TABLET DAILY 90 Tablet 0    alfuzosin SR (UROXATRAL) 10 mg SR tablet TAKE 1 TABLET DAILY AFTER DINNER 90 Tablet 1    lisinopriL (PRINIVIL, ZESTRIL) 10 mg tablet TAKE 1 TABLET DAILY 90 Tablet 1    fluticasone propionate (Flonase Allergy Relief) 50 mcg/actuation nasal spray 2 Sprays by Both Nostrils route daily.  omeprazole (PRILOSEC) 20 mg capsule TAKE 1 CAPSULE DAILY (Patient taking differently: two (2) times a day.) 90 Cap 3    sildenafiL, pulm. hypertension, (REVATIO) 20 mg tablet Take 1 Tab by mouth as needed for Other (ED). Take up to 5 tablets as needed. Do not exceed 5 tablets. Take on an empty stomach. (Patient not taking: Reported on 9/13/2021) 90 Tab 3    ibuprofen (MOTRIN IB) 200 mg tablet Take 400 mg by mouth every six (6) hours as needed for Pain.  acetaminophen (TYLENOL) 500 mg tablet Take  by mouth every six (6) hours as needed for Pain. Clinical Impression     Clinical Impression:   1. Lightheadedness    2. Chest discomfort    3. Suspected COVID-19 virus infection        Disposition:    Pt has been reexamined. Patient has no new complaints, changes, or physical findings. Care plan outlined and precautions discussed. Results were reviewed with the patient. All medications were reviewed with the patient; will d/c home with PMD f/u. All of pt's questions and concerns were addressed. Patient was instructed and agrees to follow up with PMD, as well as to return to the ED upon further deterioration. Patient is ready to go home. This note was dictated utilizing voice recognition software which may lead to typographical errors. I apologize in advance if the situation occurs. If questions arise please do not hesitate to contact me or call our department. This note was dictated utilizing voice recognition software which may lead to typographical errors. I apologize in advance if the situation occurs. If questions arise please do not hesitate to contact me or call our department.     Samantha Brown MD  3:58 PM

## 2021-09-13 NOTE — DISCHARGE INSTRUCTIONS
Check covid with results on MyChart and return if shortness of breath or chest pain  Please follow-up with PMD in 2 days

## 2021-09-14 ENCOUNTER — PATIENT OUTREACH (OUTPATIENT)
Dept: CASE MANAGEMENT | Age: 76
End: 2021-09-14

## 2021-09-14 NOTE — PROGRESS NOTES
Patient contacted regarding FFAWV-19 Rule out. Discussed COVID-19 related testing which was pending at this time. Test results were pending. Patient informed of results, if available? Still pending. Care Transition Nurse contacted the patient by telephone to perform post discharge assessment. Call within 2 business days of discharge: Yes Verified name and  with patient as identifiers. Provided introduction to self, and explanation of the CTN/ACM role, and reason for call due to risk factors for infection and/or exposure to COVID-19. Patient reports that he is doing okay. No new or worsening of symptoms reported by Pt. At this time. 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 Reminded Pt. to go to the nearest emergency room for chest pain, shortness of breath, returning of symptoms that brought him to the emergency room and/or worsening of symptoms. Pt. Verbalized and repeated back understanding. Symptoms reviewed with patient who verbalized the following symptoms: no new symptoms and no worsening symptoms      Due to no new or worsening symptoms encounter was not routed to provider for escalation. Discussed follow-up appointments. If no appointment was previously scheduled, appointment scheduling offered:  Pt. Prefers to schedule his PCP apt. [facility] follow up appointment(s): No future appointments. Non-[facility] follow up appointment(s): none noted. Interventions to address risk factors: Scheduled appointment with PCP-advise to closely follow up with PCP. Advance Care Planning:   Does patient have an Advance Directive: not on file. Educated patient about risk for severe COVID-19 due to risk factors according to CDC guidelines. CTN reviewed discharge instructions, medical action plan and red flag symptoms with the patient who verbalized understanding. Discussed exposure protocols and quarantine with CDC Guidelines.  Patient was given an opportunity to verbalize any questions and concerns and agrees to contact CTN or health care provider for questions related to their healthcare. Reviewed and educated patient on any new and changed medications related to discharge diagnosis       CTN provided contact information. Plan for follow up call in 7-14 days based on severity of symptoms and risk factors.

## 2021-09-15 DIAGNOSIS — R07.9 CHEST PAIN, UNSPECIFIED TYPE: Primary | ICD-10-CM

## 2021-09-15 LAB — SARS-COV-2, NAA: NOT DETECTED

## 2021-09-15 RX ORDER — ALBUTEROL SULFATE 90 UG/1
2 AEROSOL, METERED RESPIRATORY (INHALATION)
Qty: 18 G | Refills: 0 | Status: SHIPPED
Start: 2021-09-15 | End: 2022-01-25

## 2021-09-15 NOTE — PROGRESS NOTES
Spoke with pt regarding follow up after ER. Negative chest x-ray , EKG and cardiac enzyme. Given albuterol to take as needed. Done cardiology referral. Aspirin daily. Still having mid chest pain and radiates to neck. No sob. Present most of the time. Some discomfort for breathing but unable to explain much. No diaphoresis. No nausea or vomiting. No abdominal pain.    Negative covid test.

## 2021-09-20 ENCOUNTER — OFFICE VISIT (OUTPATIENT)
Dept: CARDIOLOGY CLINIC | Age: 76
End: 2021-09-20
Payer: MEDICARE

## 2021-09-20 VITALS
SYSTOLIC BLOOD PRESSURE: 124 MMHG | WEIGHT: 157 LBS | BODY MASS INDEX: 23.25 KG/M2 | DIASTOLIC BLOOD PRESSURE: 70 MMHG | HEIGHT: 69 IN | HEART RATE: 75 BPM | OXYGEN SATURATION: 97 %

## 2021-09-20 DIAGNOSIS — R07.9 CHEST PAIN, UNSPECIFIED TYPE: Primary | ICD-10-CM

## 2021-09-20 DIAGNOSIS — I10 ESSENTIAL HYPERTENSION: ICD-10-CM

## 2021-09-20 DIAGNOSIS — R06.09 DOE (DYSPNEA ON EXERTION): ICD-10-CM

## 2021-09-20 PROCEDURE — 99204 OFFICE O/P NEW MOD 45 MIN: CPT | Performed by: INTERNAL MEDICINE

## 2021-09-20 PROCEDURE — 1101F PT FALLS ASSESS-DOCD LE1/YR: CPT | Performed by: INTERNAL MEDICINE

## 2021-09-20 PROCEDURE — G8536 NO DOC ELDER MAL SCRN: HCPCS | Performed by: INTERNAL MEDICINE

## 2021-09-20 PROCEDURE — G8752 SYS BP LESS 140: HCPCS | Performed by: INTERNAL MEDICINE

## 2021-09-20 PROCEDURE — G8428 CUR MEDS NOT DOCUMENT: HCPCS | Performed by: INTERNAL MEDICINE

## 2021-09-20 PROCEDURE — G8754 DIAS BP LESS 90: HCPCS | Performed by: INTERNAL MEDICINE

## 2021-09-20 PROCEDURE — G8420 CALC BMI NORM PARAMETERS: HCPCS | Performed by: INTERNAL MEDICINE

## 2021-09-20 PROCEDURE — G8510 SCR DEP NEG, NO PLAN REQD: HCPCS | Performed by: INTERNAL MEDICINE

## 2021-09-20 PROCEDURE — 3017F COLORECTAL CA SCREEN DOC REV: CPT | Performed by: INTERNAL MEDICINE

## 2021-09-20 RX ORDER — ASPIRIN 81 MG/1
81 TABLET ORAL DAILY
COMMUNITY
End: 2022-01-25

## 2021-09-20 RX ORDER — NITROGLYCERIN 0.4 MG/1
0.4 TABLET SUBLINGUAL
Qty: 25 TABLET | Refills: 2 | Status: SHIPPED | OUTPATIENT
Start: 2021-09-20

## 2021-09-20 NOTE — PROGRESS NOTES
[facility]    Mr. Peter Shea is 73-year male with a history of hypertension, hyperlipidemia and other medical problems    Patient is here today for cardiac evaluation. He denies any prior history of MI and CHF. He is hard of hearing. Patient has been experiencing some occasional random episode of chest discomfort as well as shortness of breath. He is not able to explain the character of the pain just say it is a discomfort. It happens randomly but no radiation. There is no exertional symptoms. He said he is very active. He denies any palpitation, presyncope or syncope. Occasionally he has been feeling dizzy and some shortness of breath over last 2 weeks. Denies any PND or lower extremity swelling. Denies any nausea, vomiting, abdominal pain, fever, chills, sputum production. No hematuria or other bleeding complaints    Past Medical History:   Diagnosis Date    Basal cell carcinoma     BPH (benign prostatic hyperplasia)     Depression     Gastrointestinal disorder     Diverticulitis    GERD (gastroesophageal reflux disease)     History of colon polyps 2/21/2019    HTN (hypertension)     Hypertension     Inguinal hernia     left side    Psychiatric disorder     Aggression, Depression       Review of Systems:  Cardiac symptoms as noted above in HPI. All others negative. Denies fatigue, malaise, skin rash, joint pain, blurring vision, photophobia, neck pain, hemoptysis, chronic cough, nausea, vomiting, hematuria, burning micturition, BRBPR, chronic headaches. Current Outpatient Medications   Medication Sig    aspirin delayed-release 81 mg tablet Take 81 mg by mouth daily.  albuterol (PROVENTIL HFA, VENTOLIN HFA, PROAIR HFA) 90 mcg/actuation inhaler Take 2 Puffs by inhalation every four (4) hours as needed for Wheezing.     atorvastatin (LIPITOR) 20 mg tablet TAKE 1 TABLET NIGHTLY    finasteride (PROSCAR) 5 mg tablet TAKE 1 TABLET DAILY    alfuzosin SR (UROXATRAL) 10 mg SR tablet TAKE 1 TABLET DAILY AFTER DINNER    lisinopriL (PRINIVIL, ZESTRIL) 10 mg tablet TAKE 1 TABLET DAILY    fluticasone propionate (Flonase Allergy Relief) 50 mcg/actuation nasal spray 2 Sprays by Both Nostrils route daily.  omeprazole (PRILOSEC) 20 mg capsule TAKE 1 CAPSULE DAILY (Patient taking differently: two (2) times a day.)    ibuprofen (MOTRIN IB) 200 mg tablet Take 400 mg by mouth every six (6) hours as needed for Pain.  acetaminophen (TYLENOL) 500 mg tablet Take  by mouth every six (6) hours as needed for Pain. No current facility-administered medications for this visit. Past Surgical History:   Procedure Laterality Date    COLONOSCOPY N/A 2/22/2019    COLONOSCOPY, SURVEILLANCE performed [facility] Rosanne Rich [facility] ENDOSCOPY    HX 2 Jae [place] HX COLONOSCOPY      HX HERNIA REPAIR  03/14/2017    HX MALIGNANT SKIN LESION EXCISION         Allergies and Sensitivities:  No Known Allergies    Family History:  Family History   Problem Relation Age of Onset    Prostate Cancer Father        Social History:  Social History     Tobacco Use    Smoking status: Former Smoker    Smokeless tobacco: Never Used    Tobacco comment: quit 1971   Substance Use Topics    Alcohol use: Yes     Alcohol/week: 1.7 standard drinks     Types: 2 Cans of beer per week     Comment: wine 2-3 x per week with dinner    Drug use: No     He  reports that he has quit smoking. He has never used smokeless tobacco.  He  reports current alcohol use of about 1.7 standard drinks of alcohol per week.     Physical Exam:  BP Readings from Last 3 Encounters:   09/20/21 124/70   02/08/21 132/66   12/21/20 128/72         Pulse Readings from Last 3 Encounters:   09/20/21 75   04/12/21 60   02/08/21 (!) 59          Wt Readings from Last 3 Encounters:   09/20/21 71.2 kg (157 lb)   04/12/21 71.2 kg (157 lb)   02/08/21 70.3 kg (155 lb)       Constitutional: Oriented to person, place, and time. HENT: Head: Normocephalic and atraumatic. Eyes: Conjunctivae and extraocular motions are normal.   Neck: No JVD present. Carotid bruit is not appreciated. Cardiovascular: Regular rhythm. No murmur, gallop or rubs appreciated  Lung: Breath sounds normal. No respiratory distress. No ronchi or rales appreciated  Abdominal: No tenderness. No rebound and no guarding. Musculoskeletal: There is no lower extremity edema. No cynosis  Lymphadenopathy:  No cervical or supraclavicular adenopathy appriciated. Neurological: No gross motor deficit noted. Skin: No visible skin rash noted. No Ear discharge noted  Psychiatric: Normal mood and affect. LABS:   @  Lab Results   Component Value Date/Time    WBC 5.7 2021 03:51 PM    HGB 13.6 2021 03:51 PM    HCT 40.8 2021 03:51 PM    PLATELET 069  03:51 PM    MCV 95.1 2021 03:51 PM     Lab Results   Component Value Date/Time    Sodium 142 2021 03:51 PM    Potassium 4.0 2021 03:51 PM    Chloride 109 2021 03:51 PM    CO2 28 2021 03:51 PM    Glucose 130 (H) 2021 03:51 PM    BUN 19 (H) 2021 03:51 PM    Creatinine 1.07 2021 03:51 PM     Lipids Latest Ref Rng & Units 6/3/2020 2019   Chol, Total <200 MG/ 151   HDL 40 - 60 MG/(H) 102(H)   LDL 0 - 100 MG/DL 63.8 38.2   Trig <150 MG/DL 66 54   Chol/HDL Ratio 0 - 5.0   1.8 1.5   Some recent data might be hidden     Lab Results   Component Value Date/Time    ALT (SGPT) 15 (L) 2020 01:10 PM     Lab Results   Component Value Date/Time    Hemoglobin A1c 5.9 (H) 2020 08:09 AM     Lab Results   Component Value Date/Time    TSH 1.01 2020 08:14 AM     EK2021: Sinus rhythm at 66 bpm.  Normal EKG. STRESS TEST (EST, PHARM, NUC, ECHO etc)    CATHETERIZATION    IMPRESSION & PLAN:  Mr. Feliciano Kumar is 79-year-old male    Chest pain/dyspnea/dizziness:   We will need to rule out underlying coronary artery disease especially with age, gender, risk factors  We will proceed with nuclear stress test  Echo to rule out hypertensive cardiovascular heart disease and abnormal cardiac structure  Continue aspirin 81 mg daily which was started by PCP few days ago  We will provide sublingual nitroglycerin for as needed use. Hypertension:  /70. Currently on lisinopril. Continue same. Will order echo to rule out hypertensive cardiovascular heart disease    Hyperlipidemia:  Currently on atorvastatin 20 mg daily. Last LDL 63. Continue same    This plan was discussed with patient who is in agreement. Thank you for allowing me to participate in patient care. Please feel free to call me if you have any question or concern. Handy Wells MD  Please note: This document has been produced using voice recognition software. Unrecognized errors in transcription may be present.

## 2021-09-20 NOTE — LETTER
9/20/2021    Patient: Lurdes Atkinson   YOB: 1945   Date of Visit: 9/20/2021     [address]  [address]  [address] [facility]  [address] [facility] [address]    Dear Yann Parikh MD,      Thank you for referring Mr. Lexii Cortez to [facility] for evaluation. My notes for this consultation are attached. If you have questions, please do not hesitate to call me. I look forward to following your patient along with you.       Sincerely,    Jennifer Woodruff MD

## 2021-09-20 NOTE — PROGRESS NOTES
Carlee Xie presents today for   Chief Complaint   Patient presents with    New Patient     referred for chest pain     Dizziness     sometimes    Shortness of Breath     x2 weeks    Chest Pain (Angina)     intermittent middle of chest, about 2 weeks        Carlee Xie preferred language for health care discussion is english/other. Is someone accompanying this pt? no    Is the patient using any DME equipment during [address]? no    Depression Screening:  3 most recent PHQ Screens 9/20/2021   Little interest or pleasure in doing things Not at all   Feeling down, depressed, irritable, or hopeless Not at all   Total Score PHQ 2 0       Learning Assessment:  Learning Assessment 10/1/2018   PRIMARY LEARNER Patient   HIGHEST LEVEL OF EDUCATION - PRIMARY LEARNER  4 YEARS OF COLLEGE   BARRIERS PRIMARY LEARNER NONE   CO-LEARNER CAREGIVER No   PRIMARY LANGUAGE ENGLISH    NEED No   LEARNER PREFERENCE PRIMARY DEMONSTRATION   LEARNING SPECIAL TOPICS No   ANSWERED BY patient   RELATIONSHIP SELF       Abuse Screening:  Abuse Screening Questionnaire 7/1/2020   Do you ever feel afraid of your partner? N   Are you in a relationship with someone who physically or mentally threatens you? N   Is it safe for you to go home? Y       Fall Risk  Fall Risk Assessment, last 12 mths 9/20/2021   Able to walk? Yes   Fall in past 12 months? 0   Do you feel unsteady? 0   Are you worried about falling 0       Pt currently taking Anticoagulant therapy? ASA 81mg every day     Coordination of Care:  1. Have you been to the ER, urgent care clinic since your last visit? Hospitalized since your last visit? 9/13 for dizziness, sob     2. Have you seen or consulted any other health care providers outside of the [address] [place] [address] since your last visit? Include any pap smears or colon screening.  no

## 2021-10-19 ENCOUNTER — TELEPHONE (OUTPATIENT)
Dept: CARDIOLOGY CLINIC | Age: 76
End: 2021-10-19

## 2021-10-22 ENCOUNTER — TELEPHONE (OUTPATIENT)
Dept: CARDIOLOGY CLINIC | Age: 76
End: 2021-10-22

## 2021-10-22 NOTE — TELEPHONE ENCOUNTER
Called patient to inform of test results. Patient verified name and . Patient verbalized and understood results. Spoke to wife in 's absence, who is on PHI. Relayed message about normal nuc results.

## 2021-10-22 NOTE — TELEPHONE ENCOUNTER
----- Message from Bennett Voss MD sent at 10/21/2021  8:19 AM EDT -----  Cardiac testing appears normal.  Inform patient please    Thanks  SP

## 2021-11-15 ENCOUNTER — OFFICE VISIT (OUTPATIENT)
Dept: CARDIOLOGY CLINIC | Age: 76
End: 2021-11-15
Payer: MEDICARE

## 2021-11-15 VITALS
WEIGHT: 158.4 LBS | OXYGEN SATURATION: 100 % | RESPIRATION RATE: 20 BRPM | DIASTOLIC BLOOD PRESSURE: 64 MMHG | HEART RATE: 63 BPM | BODY MASS INDEX: 23.73 KG/M2 | SYSTOLIC BLOOD PRESSURE: 108 MMHG

## 2021-11-15 DIAGNOSIS — R07.9 CHEST PAIN, UNSPECIFIED TYPE: ICD-10-CM

## 2021-11-15 DIAGNOSIS — I10 ESSENTIAL HYPERTENSION WITH GOAL BLOOD PRESSURE LESS THAN 140/90: Primary | ICD-10-CM

## 2021-11-15 DIAGNOSIS — R06.09 DOE (DYSPNEA ON EXERTION): ICD-10-CM

## 2021-11-15 PROCEDURE — 1101F PT FALLS ASSESS-DOCD LE1/YR: CPT | Performed by: INTERNAL MEDICINE

## 2021-11-15 PROCEDURE — 3017F COLORECTAL CA SCREEN DOC REV: CPT | Performed by: INTERNAL MEDICINE

## 2021-11-15 PROCEDURE — G8752 SYS BP LESS 140: HCPCS | Performed by: INTERNAL MEDICINE

## 2021-11-15 PROCEDURE — G8510 SCR DEP NEG, NO PLAN REQD: HCPCS | Performed by: INTERNAL MEDICINE

## 2021-11-15 PROCEDURE — G8536 NO DOC ELDER MAL SCRN: HCPCS | Performed by: INTERNAL MEDICINE

## 2021-11-15 PROCEDURE — 99214 OFFICE O/P EST MOD 30 MIN: CPT | Performed by: INTERNAL MEDICINE

## 2021-11-15 PROCEDURE — G8754 DIAS BP LESS 90: HCPCS | Performed by: INTERNAL MEDICINE

## 2021-11-15 PROCEDURE — G8420 CALC BMI NORM PARAMETERS: HCPCS | Performed by: INTERNAL MEDICINE

## 2021-11-15 PROCEDURE — G8428 CUR MEDS NOT DOCUMENT: HCPCS | Performed by: INTERNAL MEDICINE

## 2021-11-15 NOTE — PROGRESS NOTES
Talat Lantigua presents today for No chief complaint on file. Talat Lantigua preferred language for health care discussion is english/other. Is someone accompanying this pt? no    Is the patient using any DME equipment during [address]? no    Depression Screening:  3 most recent PHQ Screens 11/15/2021   Little interest or pleasure in doing things Not at all   Feeling down, depressed, irritable, or hopeless Not at all   Total Score PHQ 2 0       Learning Assessment:  Learning Assessment 10/1/2018   PRIMARY LEARNER Patient   HIGHEST LEVEL OF EDUCATION - PRIMARY LEARNER  4 YEARS OF COLLEGE   BARRIERS PRIMARY LEARNER NONE   CO-LEARNER CAREGIVER No   PRIMARY LANGUAGE ENGLISH    NEED No   LEARNER PREFERENCE PRIMARY DEMONSTRATION   LEARNING SPECIAL TOPICS No   ANSWERED BY patient   RELATIONSHIP SELF       Abuse Screening:  Abuse Screening Questionnaire 7/1/2020   Do you ever feel afraid of your partner? N   Are you in a relationship with someone who physically or mentally threatens you? N   Is it safe for you to go home? Y       Fall Risk  Fall Risk Assessment, last 12 mths 11/15/2021   Able to walk? Yes   Fall in past 12 months? 1   Do you feel unsteady? 0   Are you worried about falling 1   Is TUG test greater than 12 seconds? 0   Is the gait abnormal? 0   Number of falls in past 12 months 1   Fall with injury? 0       Pt currently taking Anticoagulant therapy? Yes ; aspirin    Coordination of Care:  1. Have you been to the ER, urgent care clinic since your last visit? Hospitalized since your last visit? no    2. Have you seen or consulted any other health care providers outside of the [address] [place] [address] since your last visit? Include any pap smears or colon screening.  no

## 2021-11-15 NOTE — LETTER
11/15/2021    Patient: Berenice Naqvi   YOB: 1945   Date of Visit: 11/15/2021     Rafael Monzon   [address]  [address] [place] [address]  [place]ia [place]    Dear Jayjay Savage MD,      Thank you for referring Mr. Nickie Hammond to [facility] for evaluation. My notes for this consultation are attached. If you have questions, please do not hesitate to call me. I look forward to following your patient along with you.       Sincerely,    Kaitlin Saldana MD

## 2021-11-15 NOTE — PROGRESS NOTES
[facility]    Mr. Tasha Novak is 78 y. o. with a history of hypertension, hyperlipidemia and other medical problems    Patient is here today for cardiac evaluation. He denies any prior history of MI and CHF. He is hard of hearing. He denies any significant chest pain or chest tightness since last visit. He denies any hospital admission or ER visit. He has nitroglycerin however has not used it  He tells me that he has mild tinnitus for long. The time however after starting aspirin, that has gotten worse. Denies any significant palpitation. No significant shortness of breath. Denies any PND or lower extremity swelling. Denies any nausea, vomiting, abdominal pain, fever, chills, sputum production. No hematuria or other bleeding complaints    Past Medical History:   Diagnosis Date    Basal cell carcinoma     BPH (benign prostatic hyperplasia)     Depression     Gastrointestinal disorder     Diverticulitis    GERD (gastroesophageal reflux disease)     History of colon polyps 2/21/2019    HTN (hypertension)     Hypertension     Inguinal hernia     left side    Psychiatric disorder     Aggression, Depression       Review of Systems:  Cardiac symptoms as noted above in HPI. All others negative. Denies fatigue, malaise, skin rash, joint pain, blurring vision, photophobia, neck pain, hemoptysis, chronic cough, nausea, vomiting, hematuria, burning micturition, BRBPR, chronic headaches. Current Outpatient Medications   Medication Sig    finasteride (PROSCAR) 5 mg tablet TAKE 1 TABLET DAILY    atorvastatin (LIPITOR) 20 mg tablet TAKE 1 TABLET NIGHTLY    aspirin delayed-release 81 mg tablet Take 81 mg by mouth daily.  nitroglycerin (NITROSTAT) 0.4 mg SL tablet 1 Tablet by SubLINGual route every five (5) minutes as needed for Chest Pain. Up to 3 doses.     albuterol (PROVENTIL HFA, VENTOLIN HFA, PROAIR HFA) 90 mcg/actuation inhaler Take 2 Puffs by inhalation every four (4) hours as needed for Wheezing.  alfuzosin SR (UROXATRAL) 10 mg SR tablet TAKE 1 TABLET DAILY AFTER DINNER    lisinopriL (PRINIVIL, ZESTRIL) 10 mg tablet TAKE 1 TABLET DAILY    fluticasone propionate (Flonase Allergy Relief) 50 mcg/actuation nasal spray 2 Sprays by Both Nostrils route daily.  omeprazole (PRILOSEC) 20 mg capsule TAKE 1 CAPSULE DAILY (Patient taking differently: two (2) times a day.)    ibuprofen (MOTRIN IB) 200 mg tablet Take 400 mg by mouth every six (6) hours as needed for Pain.  acetaminophen (TYLENOL) 500 mg tablet Take  by mouth every six (6) hours as needed for Pain. No current facility-administered medications for this visit. Past Surgical History:   Procedure Laterality Date    COLONOSCOPY N/A 2/22/2019    COLONOSCOPY, SURVEILLANCE performed by [facility]jal [facility] at [facility] ENDOSCOPY    HX 2 [place] HX COLONOSCOPY      HX HERNIA REPAIR  03/14/2017    HX MALIGNANT SKIN LESION EXCISION         Allergies and Sensitivities:  No Known Allergies    Family History:  Family History   Problem Relation Age of Onset    Prostate Cancer Father        Social History:  Social History     Tobacco Use    Smoking status: Former Smoker    Smokeless tobacco: Never Used    Tobacco comment: quit 1971   Substance Use Topics    Alcohol use: Yes     Alcohol/week: 1.7 standard drinks     Types: 2 Cans of beer per week     Comment: wine 2-3 x per week with dinner    Drug use: No     He  reports that he has quit smoking. He has never used smokeless tobacco.  He  reports current alcohol use of about 1.7 standard drinks of alcohol per week.     Physical Exam:  BP Readings from Last 3 Encounters:   11/15/21 108/64   10/20/21 124/70   10/20/21 120/78         Pulse Readings from Last 3 Encounters:   11/15/21 63   09/20/21 75   04/12/21 60          Wt Readings from Last 3 Encounters:   11/15/21 71.8 kg (158 lb 6.4 oz)   10/20/21 71.2 kg (157 lb)   10/20/21 71.2 kg (157 lb)       Constitutional: Oriented to person, place, and time. HENT: Head: Normocephalic and atraumatic. Eyes: Conjunctivae and extraocular motions are normal.   Neck: No JVD present. Carotid bruit is not appreciated. Cardiovascular: Regular rhythm. No murmur, gallop or rubs appreciated  Lung: Breath sounds normal. No respiratory distress. No ronchi or rales appreciated  Abdominal: No tenderness. No rebound and no guarding. Musculoskeletal: There is no lower extremity edema. No cynosis    LABS:   @  Lab Results   Component Value Date/Time    WBC 5.7 2021 03:51 PM    HGB 13.6 2021 03:51 PM    HCT 40.8 2021 03:51 PM    PLATELET 944  03:51 PM    MCV 95.1 2021 03:51 PM     Lab Results   Component Value Date/Time    Sodium 142 2021 03:51 PM    Potassium 4.0 2021 03:51 PM    Chloride 109 2021 03:51 PM    CO2 28 2021 03:51 PM    Glucose 130 (H) 2021 03:51 PM    BUN 19 (H) 2021 03:51 PM    Creatinine 1.07 2021 03:51 PM     Lipids Latest Ref Rng & Units 6/3/2020 2019   Chol, Total <200 MG/ 151   HDL 40 - 60 MG/(H) 102(H)   LDL 0 - 100 MG/DL 63.8 38.2   Trig <150 MG/DL 66 54   Chol/HDL Ratio 0 - 5.0   1.8 1.5   Some recent data might be hidden     Lab Results   Component Value Date/Time    ALT (SGPT) 15 (L) 2020 01:10 PM     Lab Results   Component Value Date/Time    Hemoglobin A1c 5.9 (H) 2020 08:09 AM     Lab Results   Component Value Date/Time    TSH 1.01 2020 08:14 AM     EK2021: Sinus rhythm at 66 bpm.  Normal EKG. 10/20/21    ECHO ADULT COMPLETE 10/20/2021 10/20/2021  Interpretation Summary  · LV: Estimated LVEF is 55 - 60%. Visually measured ejection fraction. Normal cavity size, wall thickness and systolic function (ejection fraction normal). Wall motion: normal. Age-appropriate left ventricular diastolic function.   · TV: Mild to moderate tricuspid valve regurgitation is present. · AO: Moderate aortic root dilatation; diameter is 4 cm. · RA: Moderately dilated right atrium. · RV: Mildly dilated right ventricle. · IVC: Moderately elevated central venous pressure (8 mmHg); IVC diameter is larger than 21 mm and collapses more than 50% with respiration. NUCLEAR CARDIAC STRESS TEST 10/20/2021 10/20/2021  Interpretation Summary  · Baseline ECG: Sinus bradycardia. · Stress test: Positive stress test. Low risk Duke treadmill score. · SPECT: Left ventricular function post-stress was normal. Calculated ejection fraction is 65% (normal EF value is equal to or greater than 50%). Left ventricular wall motion was normal at stress, no regional wall motion abnormality noted. The TID ratio is 0.98.  · SPECT: Left ventricular perfusion is normal.  · SPECT: Left ventricular perfusion is normal. Myocardial perfusion imaging supports a low risk stress test.    IMPRESSION & PLAN:  Mr. Petey Silvestre is 81-year-old male    Chest pain/dyspnea  Denies any significant chest pain since last time. Denies any hospital admission or ER visit. Denies any use of nitroglycerin. Complaining of worsening tinnitus since starting aspirin. Have asked him to stop taking aspirin and follow-up with ENT physician. He denies any prior history of MI or CVA. Nuclear stress test and echocardiogram in 10/2021, low risk. Findings discussed with patient in detail. All the questions were answered    Hypertension:  /64. Currently on lisinopril. Continue same. Hyperlipidemia:  Currently on atorvastatin 20 mg daily. Last LDL 63. Continue same    This plan was discussed with patient who is in agreement. Thank you for allowing me to participate in patient care. Please feel free to call me if you have any question or concern. Negro Rodriguez MD  Please note: This document has been produced using voice recognition software. Unrecognized errors in transcription may be present.

## 2021-11-19 DIAGNOSIS — R73.01 IMPAIRED FASTING BLOOD SUGAR: Primary | ICD-10-CM

## 2021-11-19 DIAGNOSIS — E78.5 DYSLIPIDEMIA: ICD-10-CM

## 2021-12-01 ENCOUNTER — TELEPHONE (OUTPATIENT)
Dept: FAMILY MEDICINE CLINIC | Age: 76
End: 2021-12-01

## 2021-12-01 DIAGNOSIS — N40.0 BENIGN PROSTATIC HYPERPLASIA WITHOUT LOWER URINARY TRACT SYMPTOMS: Primary | ICD-10-CM

## 2021-12-01 DIAGNOSIS — R73.01 IMPAIRED FASTING BLOOD SUGAR: ICD-10-CM

## 2021-12-01 DIAGNOSIS — E78.00 PURE HYPERCHOLESTEROLEMIA: ICD-10-CM

## 2021-12-01 DIAGNOSIS — I10 ESSENTIAL HYPERTENSION: ICD-10-CM

## 2021-12-01 NOTE — TELEPHONE ENCOUNTER
Contacted patient and verified identity using name and date of birth (2- identifiers)  Spoke with patient and advised to please have fasting labs done in the next week or so. Will schedule follow-up when results are given. Patient would also like for a PSA to be ordered. He doesn't want to have to go all the way to  for this to be ordered. He self admitted that he's \"chicken\" about multiple blood draws.

## 2021-12-01 NOTE — TELEPHONE ENCOUNTER
Done PSA order. Please advise pt that it might not be covered by insurance due to age recommendations.      Reinaldo Kim

## 2021-12-03 ENCOUNTER — HOSPITAL ENCOUNTER (OUTPATIENT)
Dept: LAB | Age: 76
Discharge: HOME | End: 2021-12-03
Payer: MEDICARE

## 2021-12-03 DIAGNOSIS — N40.0 BENIGN PROSTATIC HYPERPLASIA WITHOUT LOWER URINARY TRACT SYMPTOMS: ICD-10-CM

## 2021-12-03 DIAGNOSIS — R73.01 IMPAIRED FASTING BLOOD SUGAR: ICD-10-CM

## 2021-12-03 DIAGNOSIS — I10 ESSENTIAL HYPERTENSION: ICD-10-CM

## 2021-12-03 DIAGNOSIS — E78.00 PURE HYPERCHOLESTEROLEMIA: ICD-10-CM

## 2021-12-03 LAB
ALBUMIN SERPL-MCNC: 3.7 G/DL (ref 3.4–5)
ALBUMIN/GLOB SERPL: 1.3 {RATIO} (ref 0.8–1.7)
ALP SERPL-CCNC: 76 U/L (ref 45–117)
ALT SERPL-CCNC: 17 U/L (ref 16–61)
ANION GAP SERPL CALC-SCNC: 6 MMOL/L (ref 3–18)
AST SERPL-CCNC: 18 U/L (ref 10–38)
BILIRUB SERPL-MCNC: 0.6 MG/DL (ref 0.2–1)
BUN SERPL-MCNC: 21 MG/DL (ref 7–18)
BUN/CREAT SERPL: 20 (ref 12–20)
CALCIUM SERPL-MCNC: 9.2 MG/DL (ref 8.5–10.1)
CHLORIDE SERPL-SCNC: 108 MMOL/L (ref 100–111)
CHOLEST SERPL-MCNC: 155 MG/DL
CO2 SERPL-SCNC: 28 MMOL/L (ref 21–32)
CREAT SERPL-MCNC: 1.05 MG/DL (ref 0.6–1.3)
EST. AVERAGE GLUCOSE BLD GHB EST-MCNC: 114 MG/DL
GLOBULIN SER CALC-MCNC: 2.9 G/DL (ref 2–4)
GLUCOSE SERPL-MCNC: 93 MG/DL (ref 74–99)
HBA1C MFR BLD: 5.6 % (ref 4.2–5.6)
HDLC SERPL-MCNC: 92 MG/DL (ref 40–60)
HDLC SERPL: 1.7 {RATIO} (ref 0–5)
LDLC SERPL CALC-MCNC: 49 MG/DL (ref 0–100)
LIPID PROFILE,FLP: ABNORMAL
POTASSIUM SERPL-SCNC: 4.5 MMOL/L (ref 3.5–5.5)
PROT SERPL-MCNC: 6.6 G/DL (ref 6.4–8.2)
PSA SERPL-MCNC: 1.6 NG/ML (ref 0–4)
SODIUM SERPL-SCNC: 142 MMOL/L (ref 136–145)
TRIGL SERPL-MCNC: 70 MG/DL (ref ?–150)
VLDLC SERPL CALC-MCNC: 14 MG/DL

## 2021-12-03 PROCEDURE — 83036 HEMOGLOBIN GLYCOSYLATED A1C: CPT

## 2021-12-03 PROCEDURE — 80061 LIPID PANEL: CPT

## 2021-12-03 PROCEDURE — 36415 COLL VENOUS BLD VENIPUNCTURE: CPT

## 2021-12-03 PROCEDURE — 84153 ASSAY OF PSA TOTAL: CPT

## 2021-12-03 PROCEDURE — 80053 COMPREHEN METABOLIC PANEL: CPT

## 2021-12-06 NOTE — PROGRESS NOTES
Contacted patient and verified identity using name and date of birth (2- identifiers)  Spoke with patient and she verbalized understanding of Labs stable. A1C in normal range.  Further discussion on follow up visit

## 2021-12-17 NOTE — PROGRESS NOTES
Ty Roberto is a 70 y.o. male right handed retiree. Worker's Compensation and legal considerations: none filed. Vitals:    04/12/21 0810   Pulse: 60   Resp: 16   SpO2: 99%   Weight: 157 lb (71.2 kg)   Height: 5' 8\" (1.727 m)   PainSc:   3   PainLoc: Hand           Chief Complaint   Patient presents with    Hand Pain     f/u with Right hand       HPI: Patient presents today more than 3 months status post right carpal tunnel release and right thumb A1 pulley release. The previous numbness he was having in his thumb has resolved. Additionally he has continued to have some numbness in his ring finger however this is not significant. He reports a recent fall and some tenderness about the wrist however this is rapidly improving and not affecting his daily living. 6 wk HPI: Patient presents today 6 weeks status post right carpal tunnel release and right thumb A1 pulley release. He reports some continued numbness in the thumb however the numbness that was in the ring finger has resolved. He also reports some pain with certain activities of daily living. 2wk HPI: Patient comes in today for first postoperative appointment approximately 2 weeks status post right carpal tunnel release and right trigger thumb release. He reports numbness in the thumb since surgery. He reports some small area of numbness at the tip of his ring finger. He denies much pain. He reports the left side numbness to be much better since receiving the injection. Initial HPI: Patient comes in today with complaints of bilateral hand numbness right much worse than left. He also has a history of bilateral trigger thumbs with the right side bothering him now for the left side not giving him any problems. He is recently had an EMG positive for bilateral carpal tunnel syndrome.     Date of onset: Chronic    Injury: No    Prior Treatment:  Yes: Comment: Nighttime braces    Numbness/ Tingling: Yes: Comment: Bilateral right worse than left      ROS: Review of Systems - General ROS: negative  Psychological ROS: negative  ENT ROS: negative  Allergy and Immunology ROS: negative  Hematological and Lymphatic ROS: negative  Respiratory ROS: no cough, shortness of breath, or wheezing  Cardiovascular ROS: no chest pain or dyspnea on exertion  Gastrointestinal ROS: no abdominal pain, change in bowel habits, or black or bloody stools  Musculoskeletal ROS: positive for - pain in hand - bilateral and thumb - right  Neurological ROS: positive for - numbness/tingling  Dermatological ROS: negative    Past Medical History:   Diagnosis Date    Basal cell carcinoma     BPH (benign prostatic hyperplasia)     Cancer (HCC)     Basal Cell Carcinoma    Depression     Gastrointestinal disorder     Diverticulitis    GERD (gastroesophageal reflux disease)     History of colon polyps 2/21/2019    Hypertension     Inguinal hernia     left side    Other ill-defined conditions(799.89)     BPH    Psychiatric disorder     Aggression, Depression       Past Surgical History:   Procedure Laterality Date    COLONOSCOPY N/A 2/22/2019    COLONOSCOPY, SURVEILLANCE performed by Génesis Haines MD at [address] [place] [address] HX CARPAL TUNNEL RELEASE      HX COLONOSCOPY      HX HERNIA REPAIR  03/14/2017    HX MALIGNANT SKIN LESION EXCISION         Current Outpatient Medications   Medication Sig Dispense Refill    atorvastatin (LIPITOR) 20 mg tablet TAKE 1 TABLET NIGHTLY 90 Tab 1    finasteride (PROSCAR) 5 mg tablet TAKE 1 TABLET DAILY 90 Tab 1    fluticasone propionate (Flonase Allergy Relief) 50 mcg/actuation nasal spray 2 Sprays by Both Nostrils route daily.  alfuzosin SR (UROXATRAL) 10 mg SR tablet TAKE 1 TABLET DAILY AFTER DINNER 90 Tab 3    omeprazole (PRILOSEC) 20 mg capsule TAKE 1 CAPSULE DAILY (Patient taking differently: two (2) times a day.) 90 Cap 3    lisinopriL (PRINIVIL, ZESTRIL) 10 mg tablet TAKE 1 TABLET DAILY 90 Tab 3    sildenafiL, pulm. hypertension, (REVATIO) 20 mg tablet Take 1 Tab by mouth as needed for Other (ED). Take up to 5 tablets as needed. Do not exceed 5 tablets. Take on an empty stomach. 90 Tab 3    ibuprofen (MOTRIN IB) 200 mg tablet Take 400 mg by mouth every six (6) hours as needed for Pain.  acetaminophen (TYLENOL) 500 mg tablet Take  by mouth every six (6) hours as needed for Pain.  OTHER L-thiamine 25 mg      allergy injection          No Known Allergies        PE:     Physical Exam  Vitals signs and nursing note reviewed. Constitutional:       General: He is not in acute distress. Appearance: Normal appearance. He is not ill-appearing. Neck:      Musculoskeletal: Normal range of motion and neck supple. Cardiovascular:      Pulses: Normal pulses. Pulmonary:      Effort: Pulmonary effort is normal. No respiratory distress. Musculoskeletal: Normal range of motion. General: No swelling, tenderness, deformity or signs of injury. Right lower leg: No edema. Left lower leg: No edema. Skin:     General: Skin is warm and dry. Capillary Refill: Capillary refill takes less than 2 seconds. Findings: No bruising or erythema. Neurological:      General: No focal deficit present. Mental Status: He is alert and oriented to person, place, and time. Cranial Nerves: No cranial nerve deficit. Sensory: No sensory deficit. Psychiatric:         Mood and Affect: Mood normal.         Behavior: Behavior normal.            Hand: No paresthesia noted in the thumb.     Examination L Digit(s) R Digit(s)   1st CMC Tenderness -  -    1st CMC Grind -  -    Kathrin Nodes -  -    Heberden Nodes -  -    A1 Pulley Tenderness -  -    Triggering -  -    UCL Instability -  -    RCL Instability -  -    Lateral Stress Pain -  -    Palmar Cords -  -    Tabletop test -  -    Garrod's Pads -  -     Strength       Pinch Strength         ROM: Full      NEUROVASCULAR    Examination L R Examination L R   Carpal Comp. + - Pronator Comp. - -   Carpal Tinel + - Pronator Tinel - -   Phalen's + - Pronator Stress - -   Cubital Comp. - - Guyon Comp. - -   Cubital Tinel - - Guyon Tinel - -   Elbow Hyperflexion - - Adson's - -   Spurling's - - SC Comp. - -   PCB Median abn - - SC Tinel - -   Radial Tinel - - IC Comp. - -   Digital Tinel - - IC Tinel - -   Radial 2-Pt WNL WNL Ulnar 2-Pt WNL WNL     Radial Pulse: 2+  Capillary Refill: < 2 sec  Nick: Not Performed  Digital Nick: Not Performed        NCS/EMG FINDINGS:  1. The right median sensory from second digit showed prolonged distal latency, decreased amplitude and conduction velocity  2. The left median sensory from the second digit showed normal peak latency but decreased amplitude and conduction velocity  3. The right ulnar sensory from the 5th digit showed normal peak latency but decreased amplitude and conduction velocity. 4. The left ulnar from the 5th digit showed normal latency and amplitude  5. The right median motor showed prolonged distal latency but normal amplitude  6. The left median motor showed prolonged distal latency but normal amplitude  7. The left and right ulnar motor studies are normal.   8. The needle exam from the sampled left APB, FDI, FCR, and Biceps are normal.       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 Imaging:     None indicated        ICD-10-CM ICD-9-CM    1. Severe carpal tunnel syndrome, right  G56.01 354.0    2. S/P carpal tunnel release  Z98.890 V45.89    3. Trigger thumb of right hand  M65.311 727.03    4. S/P trigger finger release  Z98.890 V45.89    5. Left carpal tunnel syndrome  G56.02 354.0          Plan:     Continue exercises. Observation for left side. Follow-up and Dispositions    · Return if symptoms worsen or fail to improve.           Plan was reviewed with patient, who verbalized agreement and understanding of the plan 14-Dec-2021

## 2022-01-25 ENCOUNTER — OFFICE VISIT (OUTPATIENT)
Dept: FAMILY MEDICINE CLINIC | Age: 77
End: 2022-01-25
Payer: MEDICARE

## 2022-01-25 VITALS
RESPIRATION RATE: 16 BRPM | DIASTOLIC BLOOD PRESSURE: 62 MMHG | TEMPERATURE: 97.7 F | OXYGEN SATURATION: 97 % | BODY MASS INDEX: 23.79 KG/M2 | SYSTOLIC BLOOD PRESSURE: 120 MMHG | HEIGHT: 68 IN | HEART RATE: 86 BPM | WEIGHT: 157 LBS

## 2022-01-25 DIAGNOSIS — Z00.00 MEDICARE ANNUAL WELLNESS VISIT, SUBSEQUENT: ICD-10-CM

## 2022-01-25 DIAGNOSIS — I10 ESSENTIAL HYPERTENSION: Primary | ICD-10-CM

## 2022-01-25 DIAGNOSIS — M25.561 RIGHT KNEE PAIN, UNSPECIFIED CHRONICITY: ICD-10-CM

## 2022-01-25 DIAGNOSIS — N40.0 BENIGN PROSTATIC HYPERPLASIA WITHOUT LOWER URINARY TRACT SYMPTOMS: ICD-10-CM

## 2022-01-25 DIAGNOSIS — K62.89 RECTAL PAIN: ICD-10-CM

## 2022-01-25 DIAGNOSIS — E78.00 PURE HYPERCHOLESTEROLEMIA: ICD-10-CM

## 2022-01-25 DIAGNOSIS — Z86.010 HISTORY OF COLON POLYPS: ICD-10-CM

## 2022-01-25 DIAGNOSIS — K21.9 GASTROESOPHAGEAL REFLUX DISEASE WITHOUT ESOPHAGITIS: ICD-10-CM

## 2022-01-25 PROCEDURE — G8536 NO DOC ELDER MAL SCRN: HCPCS | Performed by: FAMILY MEDICINE

## 2022-01-25 PROCEDURE — 3017F COLORECTAL CA SCREEN DOC REV: CPT | Performed by: FAMILY MEDICINE

## 2022-01-25 PROCEDURE — G8427 DOCREV CUR MEDS BY ELIG CLIN: HCPCS | Performed by: FAMILY MEDICINE

## 2022-01-25 PROCEDURE — G0439 PPPS, SUBSEQ VISIT: HCPCS | Performed by: FAMILY MEDICINE

## 2022-01-25 PROCEDURE — 1101F PT FALLS ASSESS-DOCD LE1/YR: CPT | Performed by: FAMILY MEDICINE

## 2022-01-25 PROCEDURE — G8754 DIAS BP LESS 90: HCPCS | Performed by: FAMILY MEDICINE

## 2022-01-25 PROCEDURE — G8420 CALC BMI NORM PARAMETERS: HCPCS | Performed by: FAMILY MEDICINE

## 2022-01-25 PROCEDURE — G0463 HOSPITAL OUTPT CLINIC VISIT: HCPCS | Performed by: FAMILY MEDICINE

## 2022-01-25 PROCEDURE — 99214 OFFICE O/P EST MOD 30 MIN: CPT | Performed by: FAMILY MEDICINE

## 2022-01-25 PROCEDURE — G8510 SCR DEP NEG, NO PLAN REQD: HCPCS | Performed by: FAMILY MEDICINE

## 2022-01-25 PROCEDURE — G8752 SYS BP LESS 140: HCPCS | Performed by: FAMILY MEDICINE

## 2022-01-25 NOTE — PROGRESS NOTES
This is the Subsequent Medicare Annual Wellness Exam, performed 12 months or more after the Initial AWV or the last Subsequent AWV    I have reviewed the patient's medical history in detail and updated the computerized patient record. Assessment/Plan   Education and counseling provided:  Are appropriate based on today's review and evaluation  Discussed 5 year health plan. Discussed advance directive. See ACP note from today   1. Medicare annual wellness visit, subsequent       Depression Risk Factor Screening     3 most recent PHQ Screens 11/15/2021   Little interest or pleasure in doing things Not at all   Feeling down, depressed, irritable, or hopeless Not at all   Total Score PHQ 2 0       Alcohol & Drug Abuse Risk Screen    Do you average more than 1 drink per night or more than 7 drinks a week: No    In the past three months have you have had more than 4 drinks containing alcohol on one occasion: No          Functional Ability and Level of Safety    Hearing: The patient wears hearing aids. The patient needs further evaluation. has appointment with [facility]heck      Activities of Daily Living: The home contains: handrails and grab bars  Patient does total self care      Ambulation: with no difficulty     Fall Risk:  Fall Risk Assessment, last 12 mths 11/15/2021   Able to walk? Yes   Fall in past 12 months? 1   Do you feel unsteady? 0   Are you worried about falling 1   Is TUG test greater than 12 seconds? 0   Is the gait abnormal? 0   Number of falls in past 12 months 1   Fall with injury?  0      Abuse Screen:  Patient is not abused       Cognitive Screening    Has your family/caregiver stated any concerns about your memory: no     Cognitive Screening: Normal -      Health Maintenance Due     Health Maintenance Due   Topic Date Due    Shingrix Vaccine Age 51> (1 of 2) Never done     He has taken zostravax and does not want shingrix   Patient Care Team   Patient Care Team:  Josiane Garcia MD as PCP - General (Family Medicine)  Aaliyah Alexander MD as PCP - [facility] Empaneled Provider  Yareli Mcgregor MD (Orthopedic Surgery)  Joo Romero MD (Otolaryngology)  Esmer Nicole MD as Physician (Urology)  Joo Romero MD (Otolaryngology)  Graciela Neumann MD (Ophthalmology)    History     Patient Active Problem List   Diagnosis Code    Benign prostatic hyperplasia N40.0    Groin pain R10.30    Urinary frequency R35.0    Hernia K46.9    Left lower quadrant pain R10.32    Groin fluid collection R18.8    Hearing problem of both ears H91.93    Arthritis M19.90    Hyperlipidemia E78.5    Essential hypertension I10    History of colon polyps Z86.010    Kidney stone N20.0     Past Medical History:   Diagnosis Date    Basal cell carcinoma     BPH (benign prostatic hyperplasia)     Depression     Gastrointestinal disorder     Diverticulitis    GERD (gastroesophageal reflux disease)     History of colon polyps 2/21/2019    HTN (hypertension)     Hypertension     Inguinal hernia     left side    Psychiatric disorder     Aggression, Depression      Past Surgical History:   Procedure Laterality Date    COLONOSCOPY N/A 2/22/2019    COLONOSCOPY[facility] SURVEILLANCE [facility] ENDOSCOPY    HX CARPAL TUNNEL RELEASE      HX COLONOSCOPY      HX HERNIA REPAIR  03/14/2017    HX MALIGNANT SKIN LESION EXCISION       Current Outpatient Medications   Medication Sig Dispense Refill    alfuzosin SR (UROXATRAL) 10 mg SR tablet TAKE 1 TABLET DAILY AFTER DINNER 90 Tablet 1    lisinopriL (PRINIVIL, ZESTRIL) 10 mg tablet TAKE 1 TABLET DAILY 90 Tablet 0    finasteride (PROSCAR) 5 mg tablet TAKE 1 TABLET DAILY 90 Tablet 1    atorvastatin (LIPITOR) 20 mg tablet TAKE 1 TABLET NIGHTLY 90 Tablet 1    aspirin delayed-release 81 mg tablet Take 81 mg by mouth daily.       nitroglycerin (NITROSTAT) 0.4 mg SL tablet 1 Tablet by SubLINGual route every five (5) minutes as needed for Chest Pain. Up to 3 doses. 25 Tablet 2    albuterol (PROVENTIL HFA, VENTOLIN HFA, PROAIR HFA) 90 mcg/actuation inhaler Take 2 Puffs by inhalation every four (4) hours as needed for Wheezing. 18 g 0    fluticasone propionate (Flonase Allergy Relief) 50 mcg/actuation nasal spray 2 Sprays by Both Nostrils route daily.  omeprazole (PRILOSEC) 20 mg capsule TAKE 1 CAPSULE DAILY (Patient taking differently: two (2) times a day.) 90 Cap 3    ibuprofen (MOTRIN IB) 200 mg tablet Take 400 mg by mouth every six (6) hours as needed for Pain.  acetaminophen (TYLENOL) 500 mg tablet Take  by mouth every six (6) hours as needed for Pain. No Known Allergies    Family History   Problem Relation Age of Onset    Prostate Cancer Father      Social History     Tobacco Use    Smoking status: Former Smoker    Smokeless tobacco: Never Used    Tobacco comment: quit 1971   Substance Use Topics    Alcohol use:  Yes     Alcohol/week: 1.7 standard drinks     Types: 2 Cans of beer per week     Comment: wine 2-3 x per week with dinner         Mp Lantigua LPN

## 2022-01-25 NOTE — ACP (ADVANCE CARE PLANNING)
Advance Care Planning     General Advance Care Planning (ACP) Conversation      Date of Conversation: 1/25/2022  Conducted with: Patient with Decision Making Capacity    Healthcare Decision Maker:     Primary Decision Maker: Karen Haddad - Spouse - [phone]    Primary Decision Maker: jatinder mclaughlin - Daughter - [phone]  Click here to complete [address] including selection of the Healthcare Decision Maker Relationship (ie \"Primary\")      Today we discussed advance directive. he has health power of  and living wheel. daughter needs to make all decision and she is aware where is the paper work in the house. he will provide a copy     Content/Action Overview:   see above   Pt wants to be a DNR and he will provide a documentation.      Length of Voluntary ACP Conversation in minutes:  <16 minutes (Non-Billable)    Jimena Romeo MD

## 2022-01-25 NOTE — PROGRESS NOTES
HISTORY OF PRESENT ILLNESS  Maisha Atkinson is a 78 y.o. male. HPI: Here for follow-up. History of hypertension. Vitals been stable. Taking medication with compliance. Asymptomatic. Also had a chest pain. Seen cardiology. Echo and stress test been done. Reviewed the recent note after the test done. No new recommendation. Aspirin was stopped due to feeling tingling. On statin. Without any side effects. No anginal symptoms at this time. During visit sitting comfortable without any acute distress. No need for nitroglycerin since last visit. Denies any headache, dizziness, no chest pain or trouble breathing, no arm or leg weakness. No nausea or vomiting, no weight or appetite changes, no mood changes . No urine or bowel complains, no palpitation, no diaphoresis. No abdominal pain. No cold or cough. No leg swelling. No fever. No sleep trouble. GERD symptom has been stable. Currently complaining about right knee pain which is chronic in nature. Has reviewed prior MRI. He used to follow Dr. Aditi De La Cruz. At this time needed a new referral to see the new specialist.  Having trouble exercising and prolonged walking due to pain. He has been doing ice and wearing knee support. At this time advised take Tylenol or Motrin as needed. Sending a new referral to Ortho. Pain is mild to moderate in intensity and present almost every day at this time. No fall or direct injury. Also complaining of rectal pain on and off. No constipation. No blood in the stool. Has up-to-date with colonoscopy. No rash. No concern but agreed to see GI as he has seen Dr. Tamika Urrutia in the past.  Visit Vitals  /62 (BP 1 Location: Left arm, BP Patient Position: Sitting, BP Cuff Size: Adult)   Pulse 86   Temp 97.7 °F (36.5 °C) (Temporal)   Resp 16   Ht 5' 8\" (1.727 m)   Wt 157 lb (71.2 kg)   SpO2 97%   BMI 23.87 kg/m²       Review medication list, vitals, problem list,allergies.    Lab Results   Component Value Date/Time    WBC 5.7 09/13/2021 03:51 PM    HGB 13.6 09/13/2021 03:51 PM    HCT 40.8 09/13/2021 03:51 PM    PLATELET 389 52/36/0449 03:51 PM    MCV 95.1 09/13/2021 03:51 PM     Lab Results   Component Value Date/Time    Cholesterol, total 155 12/03/2021 08:12 AM    HDL Cholesterol 92 (H) 12/03/2021 08:12 AM    LDL, calculated 49 12/03/2021 08:12 AM    VLDL, calculated 14 12/03/2021 08:12 AM    Triglyceride 70 12/03/2021 08:12 AM    CHOL/HDL Ratio 1.7 12/03/2021 08:12 AM     Lab Results   Component Value Date/Time    Sodium 142 12/03/2021 08:12 AM    Potassium 4.5 12/03/2021 08:12 AM    Chloride 108 12/03/2021 08:12 AM    CO2 28 12/03/2021 08:12 AM    Anion gap 6 12/03/2021 08:12 AM    Glucose 93 12/03/2021 08:12 AM    BUN 21 (H) 12/03/2021 08:12 AM    Creatinine 1.05 12/03/2021 08:12 AM    BUN/Creatinine ratio 20 12/03/2021 08:12 AM    GFR est AA >60 12/03/2021 08:12 AM    GFR est non-AA >60 12/03/2021 08:12 AM    Calcium 9.2 12/03/2021 08:12 AM    Bilirubin, total 0.6 12/03/2021 08:12 AM    Alk. phosphatase 76 12/03/2021 08:12 AM    Protein, total 6.6 12/03/2021 08:12 AM    Albumin 3.7 12/03/2021 08:12 AM    Globulin 2.9 12/03/2021 08:12 AM    A-G Ratio 1.3 12/03/2021 08:12 AM    ALT (SGPT) 17 12/03/2021 08:12 AM    AST (SGOT) 18 12/03/2021 08:12 AM     Lab Results   Component Value Date/Time    TSH 1.01 08/24/2020 08:14 AM     Lab Results   Component Value Date/Time    Hemoglobin A1c 5.6 12/03/2021 08:12 AM     Lab Results   Component Value Date/Time    Prostate Specific Ag 1.6 12/03/2021 08:12 AM    Prostate Specific Ag 1.7 11/18/2019 10:30 AM    Prostate Specific Ag 2.48 06/05/2018 02:53 PM    Prostate Specific Ag 2.62 01/05/2017 01:55 PM    Prostate Specific Ag 1.9 10/17/2013 12:00 AM       ROS: See HPI    Physical Exam  Constitutional:       General: He is not in acute distress. Cardiovascular:      Rate and Rhythm: Normal rate and regular rhythm. Heart sounds: Normal heart sounds.    Abdominal:      General: Bowel sounds are normal.      Palpations: Abdomen is soft. Tenderness: There is no abdominal tenderness. Genitourinary:     Comments: Refused rectal exam at this time. Will consider GI input  Musculoskeletal:         General: No swelling. Cervical back: Neck supple. Comments: Right knee: No swelling or redness. Generalized tenderness present. Range of motion within normal limit but discomfort due to pain   Neurological:      Mental Status: He is oriented to person, place, and time. Psychiatric:         Behavior: Behavior normal.         ASSESSMENT and PLAN    ICD-10-CM ICD-9-CM    1. Essential hypertension:well controlled. Continue current dose of medication and low salt diet. Exercise as tolerated. I10 401.9    2. Medicare annual wellness visit, subsequent  Z00.00 V70.0    3. Pure hypercholesterolemia: On statin. Diet modification: Asymptomatic. No concern E78.00 272.0    4. Benign prostatic hyperplasia without lower urinary tract symptoms  N40.0 600.00    5. History of colon polyps: Up-to-date with colonoscopy Z86.010 V12.72    6. Gastroesophageal reflux disease without esophagitis: Fairly stable K21.9 530.81    7. Right knee pain, unspecified chronicity: At this time sending to Ortho. M25.561 719.46 REFERRAL TO ORTHOPEDICS   8. Rectal pain: Sending to GI K62.89 569.42 REFERRAL TO GASTROENTEROLOGY   Patient understood agree with the plan  Has taken Covid shots  Has taken Zostavax and does not want Shingrix  Follow-up and Dispositions    · Return in about 4 months (around 5/25/2022). Please note that this dictation was completed with "[employer] the [employer] voice recognition software. Quite often unanticipated grammatical, syntax, homophones, and other interpretive errors are inadvertently transcribed by the computer software. Please disregard these errors. Please excuse any errors that have escaped final proofreading.

## 2022-01-25 NOTE — PATIENT INSTRUCTIONS
Medicare Wellness Visit, Male    The best way to live healthy is to have a lifestyle where you eat a well-balanced diet, exercise regularly, limit alcohol use, and quit all forms of tobacco/nicotine, if applicable. Regular preventive services are another way to keep healthy. Preventive services (vaccines, screening tests, monitoring & exams) can help personalize your care plan, which helps you manage your own care. Screening tests can find health problems at the earliest stages, when they are easiest to treat. [facility] follows the current, evidence-based guidelines published by the [facility] ([facility]) when recommending preventive services for our patients. Because we follow these guidelines, sometimes recommendations change over time as research supports it. (For example, a prostate screening blood test is no longer routinely recommended for men with no symptoms). Of course, you and your doctor may decide to screen more often for some diseases, based on your risk and co-morbidities (chronic disease you are already diagnosed with). Preventive services for you include:  - Medicare offers their members a free annual wellness visit, which is time for you and your primary care provider to discuss and plan for your preventive service needs. Take advantage of this benefit every year!  -All adults over age 72 should receive the recommended pneumonia vaccines. Current USPSTF guidelines recommend a series of two vaccines for the best pneumonia protection.   -All adults should have a flu vaccine yearly and tetanus vaccine every 10 years.  -All adults age 48 and older should receive the shingles vaccines (series of two vaccines).        -All adults age 38-68 who are overweight should have a diabetes screening test once every three years.   -Other screening tests & preventive services for persons with diabetes include: an eye exam to screen for diabetic retinopathy, a kidney function test, a foot exam, and stricter control over your cholesterol.   -Cardiovascular screening for adults with routine risk involves an electrocardiogram (ECG) at intervals determined by the provider.   -Colorectal cancer screening should be done for adults age 54-65 with no increased risk factors for colorectal cancer. There are a number of acceptable methods of screening for this type of cancer. Each test has its own benefits and drawbacks. Discuss with your provider what is most appropriate for you during your annual wellness visit. The different tests include: colonoscopy (considered the best screening method), a fecal occult blood test, a fecal DNA test, and sigmoidoscopy.  -All adults born between [facility] should be screened once for Hepatitis C.  -An Abdominal Aortic Aneurysm (AAA) Screening is recommended for men age 73-68 who has ever smoked in their lifetime. Here is a list of your current Health Maintenance items (your personalized list of preventive services) with a due date:  Health Maintenance Due   Topic Date Due    Shingles Vaccine (1 of 2) Never done        Knee Pain or Injury: Care Instructions  Your Care Instructions     Injuries are a common cause of knee problems. Sudden (acute) injuries may be caused by a direct blow to the knee. They can also be caused by abnormal twisting, bending, or falling on the knee. Pain, bruising, or swelling may be severe, and may start within minutes of the injury. Overuse is another cause of knee pain. Other causes are climbing stairs, kneeling, and other activities that use the knee. Everyday wear and tear, especially as you get older, also can cause knee pain. Rest, along with home treatment, often relieves pain and allows your knee to heal. If you have a serious knee injury, you may need tests and treatment. Follow-up care is a key part of your treatment and safety.  Be sure to make and go to all appointments, and call your doctor if you are having problems. It's also a good idea to know your test results and keep a list of the medicines you take. How can you care for yourself at home? · Be safe with medicines. Read and follow all instructions on the label. ? If the doctor gave you a prescription medicine for pain, take it as prescribed. ? If you are not taking a prescription pain medicine, ask your doctor if you can take an over-the-counter medicine. · Rest and protect your knee. Take a break from any activity that may cause pain. · Put ice or a cold pack on your knee for 10 to 20 minutes at a time. Put a thin cloth between the ice and your skin. · Prop up a sore knee on a pillow when you ice it or anytime you sit or lie down for the next 3 days. Try to keep it above the level of your heart. This will help reduce swelling. · If your knee is not swollen, you can put moist heat, a heating pad, or a warm cloth on your knee. · If your doctor recommends an elastic bandage, sleeve, or other type of support for your knee, wear it as directed. · Follow your doctor's instructions about how much weight you can put on your leg. Use a cane, crutches, or a walker as instructed. · Follow your doctor's instructions about activity during your healing process. If you can do mild exercise, slowly increase your activity. · Reach and stay at a healthy weight. Extra weight can strain the joints, especially the knees and hips, and make the pain worse. Losing even a few pounds may help. When should you call for help? Call 911 anytime you think you may need emergency care. For example, call if:    · You have symptoms of a blood clot in your lung (called a pulmonary embolism). These may include:  ? Sudden chest pain. ? Trouble breathing. ? Coughing up blood.    Call your doctor now or seek immediate medical care if:    · You have severe or increasing pain.     · Your leg or foot turns cold or changes color.     · You cannot stand or put weight on your knee.     · Your knee looks twisted or bent out of shape.     · You cannot move your knee.     · You have signs of infection, such as:  ? Increased pain, swelling, warmth, or redness. ? Red streaks leading from the knee. ? Pus draining from a place on your knee. ? A fever.     · You have signs of a blood clot in your leg (called a deep vein thrombosis), such as:  ? Pain in your calf, back of the knee, thigh, or groin. ? Redness and swelling in your leg or groin. Watch closely for changes in your health, and be sure to contact your doctor if:    · You have tingling, weakness, or numbness in your knee.     · You have any new symptoms, such as swelling.     · You have bruises from a knee injury that last longer than 2 weeks.     · You do not get better as expected. Where can you learn more? Go to http://www.gray.com/  Enter K195 in the search box to learn more about \"Knee Pain or Injury: Care Instructions. \"  Current as of: July 1, 2021               Content Version: 13.0  © 9230-5092 [employer]. Care instructions adapted under license by [employer] (which disclaims liability or warranty for this information). If you have questions about a medical condition or this instruction, always ask your healthcare professional. [address] any warranty or liability for your use of this information. A Healthy Lifestyle: Care Instructions  Your Care Instructions     A healthy lifestyle can help you feel good, stay at a healthy weight, and have plenty of energy for both work and play. A healthy lifestyle is something you can share with your whole family. A healthy lifestyle also can lower your risk for serious health problems, such as high blood pressure, heart disease, and diabetes. You can follow a few steps listed below to improve your health and the health of your family.   Follow-up care is a key part of your treatment and safety. Be sure to make and go to all appointments, and call your doctor if you are having problems. It's also a good idea to know your test results and keep a list of the medicines you take. How can you care for yourself at home? · Do not eat too much sugar, fat, or fast foods. You can still have dessert and treats now and then. The goal is moderation. · Start small to improve your eating habits. Pay attention to portion sizes, drink less juice and soda pop, and eat more fruits and vegetables. ? Eat a healthy amount of food. A 3-ounce serving of meat, for example, is about the size of a deck of cards. Fill the rest of your plate with vegetables and whole grains. ? Limit the amount of soda and sports drinks you have every day. Drink more water when you are thirsty. ? Eat plenty of fruits and vegetables every day. Have an apple or some carrot sticks as an afternoon snack instead of a candy bar. Try to have fruits and/or vegetables at every meal.  · Make exercise part of your daily routine. You may want to start with simple activities, such as walking, bicycling, or slow swimming. Try to be active 30 to 60 minutes every day. You do not need to do all 30 to 60 minutes all at once. For example, you can exercise 3 times a day for 10 or 20 minutes. Moderate exercise is safe for most people, but it is always a good idea to talk to your doctor before starting an exercise program.  · Keep moving. [place] the lawn, work in the garden, or [place]. Take the stairs instead of the elevator at work. · If you smoke, quit. People who smoke have an increased risk for heart attack, stroke, cancer, and other lung illnesses. Quitting is hard, but there are ways to boost your chance of quitting tobacco for good. ? Use nicotine gum, patches, or lozenges. ? Ask your doctor about stop-smoking programs and medicines. ? Keep trying.   In addition to reducing your risk of diseases in the future, you will notice some benefits soon after you stop using tobacco. If you have shortness of breath or asthma symptoms, they will likely get better within a few weeks after you quit. · Limit how much alcohol you drink. Moderate amounts of alcohol (up to 2 drinks a day for men, 1 drink a day for women) are okay. But drinking too much can lead to liver problems, high blood pressure, and other health problems. Family health  If you have a family, there are many things you can do together to improve your health. · Eat meals together as a family as often as possible. · Eat healthy foods. This includes fruits, vegetables, lean meats and dairy, and whole grains. · Include your family in your fitness plan. Most people think of activities such as jogging or tennis as the way to fitness, but there are many ways you and your family can be more active. Anything that makes you breathe hard and gets your heart pumping is exercise. Here are some tips:  ? Walk to do errands or to take your child to school or the bus.  ? Go for a family bike ride after dinner instead of watching TV. Where can you learn more? Go to http://www.gray.com/  Enter F549 in the search box to learn more about \"A Healthy Lifestyle: Care Instructions. \"  Current as of: June 16, 2021               Content Version: 13.0  © 2006-2021 Healthwise, [employer]. Care instructions adapted under license by [employer] (which disclaims liability or warranty for this information). If you have questions about a medical condition or this instruction, always ask your healthcare professional. [place] [address] any warranty or liability for your use of this information. Low Sodium Diet (2,000 Milligram): Care Instructions  Overview     Limiting sodium can be an important part of managing some health problems. The most common source of sodium is salt. People get most of the salt in their diet from canned, prepared, and packaged foods.  Fast food and restaurant meals also are very high in sodium. Your doctor will probably limit your sodium to less than 2,000 milligrams (mg) a day. This limit counts all the sodium in prepared and packaged foods and any salt you add to your food. Follow-up care is a key part of your treatment and safety. Be sure to make and go to all appointments, and call your doctor if you are having problems. It's also a good idea to know your test results and keep a list of the medicines you take. How can you care for yourself at home? Read food labels  · Read labels on cans and food packages. The labels tell you how much sodium is in each serving. Make sure that you look at the serving size. If you eat more than the serving size, you have eaten more sodium. · Food labels also tell you the Percent Daily Value for sodium. Choose products with low Percent Daily Values for sodium. · Be aware that sodium can come in forms other than salt, including monosodium glutamate (MSG), sodium citrate, and sodium bicarbonate (baking soda). MSG is often added to Asian food. When you eat out, you can sometimes ask for food without MSG or added salt. Buy low-sodium foods  · Buy foods that are labeled \"unsalted\" (no salt added), \"sodium-free\" (less than 5 mg of sodium per serving), or \"low-sodium\" (140 mg or less of sodium per serving). Foods labeled \"reduced-sodium\" and \"light sodium\" may still have too much sodium. Be sure to read the label to see how much sodium you are getting. · Buy fresh vegetables, or frozen vegetables without added sauces. Buy low-sodium versions of canned vegetables, soups, and other canned goods. Prepare low-sodium meals  · Cut back on the amount of salt you use in cooking. This will help you adjust to the taste. Do not add salt after cooking. One teaspoon of salt has about 2,300 mg of sodium. · Take the salt shaker off the table. · Flavor your food with garlic, lemon juice, onion, vinegar, herbs, and spices.  Do not use soy sauce, lite soy sauce, steak sauce, onion salt, garlic salt, celery salt, or ketchup on your food. · Use low-sodium salad dressings, sauces, and ketchup. Or make your own salad dressings and sauces without adding salt. · Use less salt (or none) when recipes call for it. You can often use half the salt a recipe calls for without losing flavor. Other foods such as rice, pasta, and grains do not need added salt. · Rinse canned vegetables, and cook them in fresh water. This removes somebut not allof the salt. · Avoid water that is naturally high in sodium or that has been treated with water softeners, which add sodium. If you buy bottled water, read the label and choose a sodium-free brand. Avoid high-sodium foods  · Avoid eating:  ? Smoked, cured, salted, and canned meat, fish, and poultry. ? Ham, maravilla, hot dogs, and luncheon meats. ? Regular, hard, and processed cheese and regular peanut butter. ? Crackers with salted tops, and other salted snack foods such as pretzels, chips, and salted popcorn. ? Frozen prepared meals, unless labeled low-sodium. ? Canned and dried soups, broths, and bouillon, unless labeled sodium-free or low-sodium. ? Canned vegetables, unless labeled sodium-free or low-sodium. ? [place] fries, pizza, tacos, and other fast foods. ? Pickles, olives, ketchup, and other condiments, especially soy sauce, unless labeled sodium-free or low-sodium. Where can you learn more? Go to http://www.gray.com/  Enter V843 in the search box to learn more about \"Low Sodium Diet (2,000 Milligram): Care Instructions. \"  Current as of: December 17, 2020               Content Version: 13.0  © 3658-9481 Healthwise[employer]. Care instructions adapted under license by [employer] (which disclaims liability or warranty for this information).  If you have questions about a medical condition or this instruction, always ask your healthcare professional. [employer] disclaims any warranty or liability for your use of this information. Anal Pain: Care Instructions  Your Care Instructions  Pain in the opening to the rectum (anus) can be caused by diarrhea or constipation or by scratching a rectal itch. A common cause of anal pain is a tear in the lining of the lower rectum (anal fissure). This type of anal pain usually goes away when the problem clears up. Injury during anal sex or from an object being placed in the rectum also can cause pain. A rare cause of anal pain is spasms of the muscles in the rectum. Some of these conditions may cause some light bleeding. Home treatment usually can relieve anal pain. If you continue to have anal pain, your doctor may prescribe medicine to relieve pain and other symptoms. Depending on the cause, you may need other treatment. Follow-up care is a key part of your treatment and safety. Be sure to make and go to all appointments, and call your doctor if you are having problems. It's also a good idea to know your test results and keep a list of the medicines you take. How can you care for yourself at home? · Sit in a few inches of warm water (sitz bath) 3 times a day and after bowel movements. The warm water eases discomfort. Do not put soaps, salts, or shampoos in the water. · Drink plenty of fluids. If you have kidney, heart, or liver disease and have to limit fluids, talk with your doctor before you increase the amount of fluids you drink. · Include high-fiber foods, such as fruits, vegetables, beans, and whole grains, in your diet each day. · Take a fiber supplement, such as Benefiber, Citrucel, or Metamucil, every day. Read and follow all instructions on the label. · Use the toilet when you feel the urge. Or when you can, schedule time each day for a bowel movement. A daily routine may help. Take your time and do not strain when having a bowel movement. But do not sit on the toilet too long.   · Support your feet with a small step stool when you sit on the toilet. This helps flex your hips and places your pelvis in a squatting position. · Your doctor may recommend an over-the-counter laxative, such as Miralax, Milk of Magnesia, or Ex-Lax. Read and follow all instructions on the label, and do not use laxatives on a long-term basis. · Do not use over-the-counter ointments or creams without talking to your doctor. Some of these may not help. · Use baby wipes or medicated pads, such as Preparation H or Tucks, instead of toilet paper to clean after a bowel movement. These products do not irritate the anus. · Be safe with medicines. Read and follow all instructions on the label. ? If the doctor gave you a prescription medicine for pain, give it as prescribed. ? If you are not taking a prescription pain medicine, ask your doctor if you can take an over-the-counter medicine. When should you call for help? Call your doctor now or seek immediate medical care if:    · You have new or worse pain.     · You have new or worse bleeding from the rectum. Watch closely for changes in your health, and be sure to contact your doctor if:    · You have trouble passing stools.     · You do not get better as expected. Where can you learn more? Go to http://www.gray.com/  Enter B355 in the search box to learn more about \"Anal Pain: Care Instructions. \"  Current as of: February 10, 2021               Content Version: 13.0  © 2006-2021 [employer]. Care instructions adapted under license by [employer] (which disclaims liability or warranty for this information). If you have questions about a medical condition or this instruction, always ask your healthcare professional. [address] any warranty or liability for your use of this information.

## 2022-03-18 PROBLEM — M19.90 ARTHRITIS: Status: ACTIVE | Noted: 2018-12-03

## 2022-03-19 PROBLEM — Z86.010 HISTORY OF COLON POLYPS: Status: ACTIVE | Noted: 2019-02-21

## 2022-03-19 PROBLEM — Z86.0100 HISTORY OF COLON POLYPS: Status: ACTIVE | Noted: 2019-02-21

## 2022-03-19 PROBLEM — I10 ESSENTIAL HYPERTENSION: Status: ACTIVE | Noted: 2018-12-03

## 2022-03-19 PROBLEM — H91.93 HEARING PROBLEM OF BOTH EARS: Status: ACTIVE | Noted: 2018-12-03

## 2022-03-20 PROBLEM — E78.5 HYPERLIPIDEMIA: Status: ACTIVE | Noted: 2018-12-03

## 2022-04-15 ENCOUNTER — TRANSCRIBE ORDER (OUTPATIENT)
Dept: SCHEDULING | Age: 77
End: 2022-04-15

## 2022-04-15 DIAGNOSIS — K62.89 RECTAL PAIN: Primary | ICD-10-CM

## 2022-05-09 DIAGNOSIS — M25.562 PAIN IN BOTH KNEES, UNSPECIFIED CHRONICITY: Primary | ICD-10-CM

## 2022-05-09 DIAGNOSIS — M25.561 PAIN IN BOTH KNEES, UNSPECIFIED CHRONICITY: Primary | ICD-10-CM

## 2022-05-25 ENCOUNTER — OFFICE VISIT (OUTPATIENT)
Dept: FAMILY MEDICINE CLINIC | Age: 77
End: 2022-05-25
Payer: MEDICARE

## 2022-05-25 VITALS
HEART RATE: 70 BPM | TEMPERATURE: 97 F | RESPIRATION RATE: 16 BRPM | WEIGHT: 157.4 LBS | DIASTOLIC BLOOD PRESSURE: 60 MMHG | BODY MASS INDEX: 23.86 KG/M2 | HEIGHT: 68 IN | OXYGEN SATURATION: 100 % | SYSTOLIC BLOOD PRESSURE: 128 MMHG

## 2022-05-25 DIAGNOSIS — K62.89 RECTAL PAIN: Primary | ICD-10-CM

## 2022-05-25 DIAGNOSIS — M25.562 PAIN IN BOTH KNEES, UNSPECIFIED CHRONICITY: ICD-10-CM

## 2022-05-25 DIAGNOSIS — M25.561 PAIN IN BOTH KNEES, UNSPECIFIED CHRONICITY: ICD-10-CM

## 2022-05-25 DIAGNOSIS — I10 PRIMARY HYPERTENSION: ICD-10-CM

## 2022-05-25 PROCEDURE — G8420 CALC BMI NORM PARAMETERS: HCPCS | Performed by: FAMILY MEDICINE

## 2022-05-25 PROCEDURE — 99213 OFFICE O/P EST LOW 20 MIN: CPT | Performed by: FAMILY MEDICINE

## 2022-05-25 PROCEDURE — 1123F ACP DISCUSS/DSCN MKR DOCD: CPT | Performed by: FAMILY MEDICINE

## 2022-05-25 PROCEDURE — G8752 SYS BP LESS 140: HCPCS | Performed by: FAMILY MEDICINE

## 2022-05-25 PROCEDURE — G8427 DOCREV CUR MEDS BY ELIG CLIN: HCPCS | Performed by: FAMILY MEDICINE

## 2022-05-25 PROCEDURE — G8754 DIAS BP LESS 90: HCPCS | Performed by: FAMILY MEDICINE

## 2022-05-25 PROCEDURE — G8510 SCR DEP NEG, NO PLAN REQD: HCPCS | Performed by: FAMILY MEDICINE

## 2022-05-25 PROCEDURE — 1101F PT FALLS ASSESS-DOCD LE1/YR: CPT | Performed by: FAMILY MEDICINE

## 2022-05-25 PROCEDURE — 3017F COLORECTAL CA SCREEN DOC REV: CPT | Performed by: FAMILY MEDICINE

## 2022-05-25 PROCEDURE — G0463 HOSPITAL OUTPT CLINIC VISIT: HCPCS | Performed by: FAMILY MEDICINE

## 2022-05-25 PROCEDURE — G8536 NO DOC ELDER MAL SCRN: HCPCS | Performed by: FAMILY MEDICINE

## 2022-05-25 NOTE — PROGRESS NOTES
1. Have you been to the ER, urgent care clinic since your last visit? Hospitalized since your last visit? No    2. Have you seen or consulted any other health care providers outside of the [address] [place] [address] since your last visit? Include any pap smears or colon screening. [employer] 5/11/22.     Chief Complaint   Patient presents with    Hypertension    Cholesterol Problem    GERD    Benign Prostatic Hypertrophy    Knee Pain     right knee pain f/u    Anal Pain     f/u    Fall     tripped over fence

## 2022-05-25 NOTE — PROGRESS NOTES
HISTORY OF PRESENT ILLNESS  Carrol Martin is a 78 y.o. male. HPI: here for follow up on rectal pain. Seen GI. Currently pain has improved some. Had a sigmoidoscopy done and does show hemorrhoids otherwise no acute finding. No change in bowel movement. No blood in urine or bowel movement. Recommended MRI. He has not completed it yet as thinking about it. Currently concern with bilateral knee pain. Has seen Ortho at [facility] as was not able to get in touch with Dr. Ethan Wallace office and had a joint injection. It has improved a lot. Able to bear weight on it. Mild intensity discomfort on and off. Currently concern with the right knee pain. Localized. No bilateral leg pain or tingling numbness at this time. Has coming up appointment with Dr. Emelia Booker. We will discuss it further with them. Sitting comfortable without any acute distress. Hypertension. Vitals been stable. Asymptomatic. Taking medication with compliance. Visit Vitals  /60 (BP 1 Location: Left upper arm, BP Patient Position: Sitting, BP Cuff Size: Adult)   Pulse 70   Temp 97 °F (36.1 °C) (Temporal)   Resp 16   Ht 5' 8\" (1.727 m)   Wt 157 lb 6.4 oz (71.4 kg)   SpO2 100%   BMI 23.93 kg/m²     Denies any headache, dizziness, no chest pain or trouble breathing, no arm or leg weakness. No nausea or vomiting, no weight or appetite changes, no mood changes . No urine or bowel complains, no palpitation, no diaphoresis. No abdominal pain. No cold or cough. No leg swelling. No fever. No sleep trouble. ROS: See HPI    Physical Exam  Cardiovascular:      Rate and Rhythm: Normal rate. Musculoskeletal:      Comments: Bilaterally: No swelling or redness. Left knee range of motion within normal limit. No point tenderness  Right knee: Generalized discomfort and tenderness. No swelling or redness.   Range of motion within normal limit but discomfort with the movements   Neurological:      Mental Status: He is alert and oriented to person, place, and time. Psychiatric:         Behavior: Behavior normal.         ASSESSMENT and PLAN    ICD-10-CM ICD-9-CM    1. Rectal pain: Sigmoidoscopy showed no acute finding except hemorrhoids. Pain has been improved a little bit. Recommended MRI but patient wanted to think about it. Provided central scheduling number in case he decides to schedule the study. K62.89 569.42    2. Pain in both knees, unspecified chronicity: Will be following Ortho. See HPI M25.561 719.46     M25.562      seen perfecto ely and had left knee injection and it helped. 3. Primary hypertension:well controlled. Continue current dose of medication and low salt diet. Exercise as tolerated. I10 401.9    Patient understood agreed with the plan  Follow-up and Dispositions    · Return in about 6 months (around 11/25/2022). Please note that this dictation was completed with [employer], the computer voice recognition software. Quite often unanticipated grammatical, syntax, homophones, and other interpretive errors are inadvertently transcribed by the computer software. Please disregard these errors. Please excuse any errors that have escaped final proofreading.

## 2022-05-25 NOTE — PATIENT INSTRUCTIONS
Knee: Exercises  Introduction  Here are some examples of exercises for you to try. The exercises may be suggested for a condition or for rehabilitation. Start each exercise slowly. Ease off the exercises if you start to have pain. You will be told when to start these exercises and which ones will work best for you. How to do the exercises  Quad sets    1. Sit with your leg straight and supported on the floor or a firm bed. (If you feel discomfort in the front or back of your knee, place a small towel roll under your knee.)  2. Tighten the muscles on top of your thigh by pressing the back of your knee flat down to the floor. (If you feel discomfort under your kneecap, place a small towel roll under your knee.)  3. Hold for about 6 seconds, then rest for up to 10 seconds. 4. Do 8 to 12 repetitions several times a day. Straight-leg raises to the front    1. Lie on your back with your good knee bent so that your foot rests flat on the floor. Your injured leg should be straight. Make sure that your low back has a normal curve. You should be able to slip your flat hand in between the floor and the small of your back, with your palm touching the floor and your back touching the back of your hand. 2. Tighten the thigh muscles in the injured leg by pressing the back of your knee flat down to the floor. Hold your knee straight. 3. Keeping the thigh muscles tight, lift your injured leg up so that your heel is about 12 inches off the floor. Hold for about 6 seconds and then lower slowly. 4. Do 8 to 12 repetitions, 3 times a day. Straight-leg raises to the outside    1. Lie on your side, with your injured leg on top. 2. Tighten the front thigh muscles of your injured leg to keep your knee straight. 3. Keep your hip and your leg straight in line with the rest of your body, and keep your knee pointing forward. Do not drop your hip back.   4. Lift your injured leg straight up toward the ceiling, about 12 inches off the floor. Hold for about 6 seconds, then slowly lower your leg. 5. Do 8 to 12 repetitions. Straight-leg raises to the back    1. Lie on your stomach, and lift your leg straight up behind you (toward the ceiling). 2. Lift your toes about 6 inches off the floor, hold for about 6 seconds, then lower slowly. 3. Do 8 to 12 repetitions. Straight-leg raises to the inside    1. Lie on the side of your body with the injured leg. 2. You can either prop your other (good) leg up on a chair, or you can bend your good knee and put that foot in front of your injured knee. Do not drop your hip back. 3. Tighten the muscles on the front of your thigh to straighten your injured knee. 4. Keep your kneecap pointing forward, and lift your whole leg up toward the ceiling about 6 inches. Hold for about 6 seconds, then lower slowly. 5. Do 8 to 12 repetitions. Heel dig bridging    1. Lie on your back with both knees bent and your ankles bent so that only your heels are digging into the floor. Your knees should be bent about 90 degrees. 2. Then push your heels into the floor, squeeze your buttocks, and lift your hips off the floor until your shoulders, hips, and knees are all in a straight line. 3. Hold for about 6 seconds as you continue to breathe normally, and then slowly lower your hips back down to the floor and rest for up to 10 seconds. 4. Do 8 to 12 repetitions. Hamstring curls    1. Lie on your stomach with your knees straight. If your kneecap is uncomfortable, roll up a washcloth and put it under your leg just above your kneecap. 2. Lift the foot of your injured leg by bending the knee so that you bring the foot up toward your buttock. If this motion hurts, try it without bending your knee quite as far. This may help you avoid any painful motion. 3. Slowly lower your leg back to the floor. 4. Do 8 to 12 repetitions.   5. With permission from your doctor or physical therapist, you may also want to add a cuff weight to your ankle (not more than 5 pounds). With weight, you do not have to lift your leg more than 12 inches to get a hamstring workout. Shallow standing knee bends    Do this exercise only if you have very little pain; if you have no clicking, locking, or giving way if you have an injured knee; and if it does not hurt while you are doing 8 to 12 repetitions. 1. Stand with your hands lightly resting on a counter or chair in front of you. Put your feet shoulder-width apart. 2. Slowly bend your knees so that you squat down like you are going to sit in a chair. Make sure your knees do not go in front of your toes. 3. Lower yourself about 6 inches. Your heels should remain on the floor at all times. 4. Rise slowly to a standing position. Heel raises    1. Stand with your feet a few inches apart, with your hands lightly resting on a counter or chair in front of you. 2. Slowly raise your heels off the floor while keeping your knees straight. 3. Hold for about 6 seconds, then slowly lower your heels to the floor. 4. Do 8 to 12 repetitions several times during the day. Follow-up care is a key part of your treatment and safety. Be sure to make and go to all appointments, and call your doctor if you are having problems. It's also a good idea to know your test results and keep a list of the medicines you take. Where can you learn more? Go to http://www.gray.com/  Enter G409 in the search box to learn more about \"Knee: Exercises. \"  Current as of: July 1, 2021               Content Version: 13.2  © 0128-8583 [employer] Care instructions adapted under license by [employer] (which disclaims liability or warranty for this information). If you have questions about a medical condition or this instruction, always ask your healthcare professional. [facility] any warranty or liability for your use of this information.          Knee Arthritis: Exercises  Introduction  Here are some examples of exercises for you to try. The exercises may be suggested for a condition or for rehabilitation. Start each exercise slowly. Ease off the exercises if you start to have pain. You will be told when to start these exercises and which ones will work best for you. How to do the exercises  Knee flexion with heel slide    1. Lie on your back with your knees bent. 2. Slide your heel back by bending your affected knee as far as you can. Then hook your other foot around your ankle to help pull your heel even farther back. 3. Hold for about 6 seconds, then rest for up to 10 seconds. 4. Repeat 8 to 12 times. 5. Switch legs and repeat steps 1 through 4, even if only one knee is sore. Quad sets    1. Sit with your affected leg straight and supported on the floor or a firm bed. Place a small, rolled-up towel under your knee. Your other leg should be bent, with that foot flat on the floor. 2. Tighten the thigh muscles of your affected leg by pressing the back of your knee down into the towel. 3. Hold for about 6 seconds, then rest for up to 10 seconds. 4. Repeat 8 to 12 times. 5. Switch legs and repeat steps 1 through 4, even if only one knee is sore. Straight-leg raises to the front    1. Lie on your back with your good knee bent so that your foot rests flat on the floor. Your affected leg should be straight. Make sure that your low back has a normal curve. You should be able to slip your hand in between the floor and the small of your back, with your palm touching the floor and your back touching the back of your hand. 2. Tighten the thigh muscles in your affected leg by pressing the back of your knee flat down to the floor. Hold your knee straight. 3. Keeping the thigh muscles tight and your leg straight, lift your affected leg up so that your heel is about 12 inches off the floor. Hold for about 6 seconds, then lower slowly.   4. Relax for up to 10 seconds between repetitions. 5. Repeat 8 to 12 times. 6. Switch legs and repeat steps 1 through 5, even if only one knee is sore. Active knee flexion    1. Lie on your stomach with your knees straight. If your kneecap is uncomfortable, roll up a washcloth and put it under your leg just above your kneecap. 2. Lift the foot of your affected leg by bending the knee so that you bring the foot up toward your buttock. If this motion hurts, try it without bending your knee quite as far. This may help you avoid any painful motion. 3. Slowly move your leg up and down. 4. Repeat 8 to 12 times. 5. Switch legs and repeat steps 1 through 4, even if only one knee is sore. Quadriceps stretch (facedown)    1. Lie flat on your stomach, and rest your face on the floor. 2. Wrap a towel or belt strap around the lower part of your affected leg. Then use the towel or belt strap to slowly pull your heel toward your buttock until you feel a stretch. 3. Hold for about 15 to 30 seconds, then relax your leg against the towel or belt strap. 4. Repeat 2 to 4 times. 5. Switch legs and repeat steps 1 through 4, even if only one knee is sore. Stationary exercise bike    1. If you do not have a stationary exercise bike at home, you can find one to ride at your local health club or community center. 2. Adjust the height of the bike seat so that your knee is slightly bent when your leg is extended downward. If your knee hurts when the pedal reaches the top, you can raise the seat so that your knee does not bend as much. 3. Start slowly. At first, try to do 5 to 10 minutes of cycling with little to no resistance. Then increase your time and the resistance bit by bit until you can do 20 to 30 minutes without pain. 4. If you start to have pain, rest your knee until your pain gets back to the level that is normal for you. Or cycle for less time or with less effort. Follow-up care is a key part of your treatment and safety.  Be sure to make and go to all appointments, and call your doctor if you are having problems. It's also a good idea to know your test results and keep a list of the medicines you take. Where can you learn more? Go to http://www.[employer].com/  Enter C159 in the search box to learn more about \"Knee Arthritis: Exercises. \"  Current as of: July 1, 2021               Content Version: 13.2  © 2006-2022 [employer]. Care instructions adapted under license by [employer] (which disclaims liability or warranty for this information). If you have questions about a medical condition or this instruction, always ask your healthcare professional. [place] [address] any warranty or liability for your use of this information. Low Sodium Diet (2,000 Milligram): Care Instructions  Overview     Limiting sodium can be an important part of managing some health problems. The most common source of sodium is salt. People get most of the salt in their diet from canned, prepared, and packaged foods. Fast food and restaurant meals also are very high in sodium. Your doctor will probably limit your sodium to less than 2,000 milligrams (mg) a day. This limit counts all the sodium in prepared and packaged foods and any salt you add to your food. Follow-up care is a key part of your treatment and safety. Be sure to make and go to all appointments, and call your doctor if you are having problems. It's also a good idea to know your test results and keep a list of the medicines you take. How can you care for yourself at home? Read food labels  · Read labels on cans and food packages. The labels tell you how much sodium is in each serving. Make sure that you look at the serving size. If you eat more than the serving size, you have eaten more sodium. · Food labels also tell you the Percent Daily Value for sodium. Choose products with low Percent Daily Values for sodium.   · Be aware that sodium can come in forms other than salt, including monosodium glutamate (MSG), sodium citrate, and sodium bicarbonate (baking soda). MSG is often added to Asian food. When you eat out, you can sometimes ask for food without MSG or added salt. Buy low-sodium foods  · Buy foods that are labeled \"unsalted\" (no salt added), \"sodium-free\" (less than 5 mg of sodium per serving), or \"low-sodium\" (140 mg or less of sodium per serving). Foods labeled \"reduced-sodium\" and \"light sodium\" may still have too much sodium. Be sure to read the label to see how much sodium you are getting. · Buy fresh vegetables, or frozen vegetables without added sauces. Buy low-sodium versions of canned vegetables, soups, and other canned goods. Prepare low-sodium meals  · Cut back on the amount of salt you use in cooking. This will help you adjust to the taste. Do not add salt after cooking. One teaspoon of salt has about 2,300 mg of sodium. · Take the salt shaker off the table. · Flavor your food with garlic, lemon juice, onion, vinegar, herbs, and spices. Do not use soy sauce, lite soy sauce, steak sauce, onion salt, garlic salt, celery salt, or ketchup on your food. · Use low-sodium salad dressings, sauces, and ketchup. Or make your own salad dressings and sauces without adding salt. · Use less salt (or none) when recipes call for it. You can often use half the salt a recipe calls for without losing flavor. Other foods such as rice, pasta, and grains do not need added salt. · Rinse canned vegetables, and cook them in fresh water. This removes somebut not allof the salt. · Avoid water that is naturally high in sodium or that has been treated with water softeners, which add sodium. If you buy bottled water, read the label and choose a sodium-free brand. Avoid high-sodium foods  · Avoid eating:  ? Smoked, cured, salted, and canned meat, fish, and poultry. ? Ham, maravilla, hot dogs, and luncheon meats.   ? Regular, hard, and processed cheese and regular peanut butter. ? Crackers with salted tops, and other salted snack foods such as pretzels, chips, and salted popcorn. ? Frozen prepared meals, unless labeled low-sodium. ? Canned and dried soups, broths, and bouillon, unless labeled sodium-free or low-sodium. ? Canned vegetables, unless labeled sodium-free or low-sodium. ? [place] fries, pizza, tacos, and other fast foods. ? Pickles, olives, ketchup, and other condiments, especially soy sauce, unless labeled sodium-free or low-sodium. Where can you learn more? Go to http://www.gray.com/  Enter V843 in the search box to learn more about \"Low Sodium Diet (2,000 Milligram): Care Instructions. \"  Current as of: September 8, 2021               Content Version: 13.2  © 3315-7199 [employer], [facility]. Care instructions adapted under license by [employer] (which disclaims liability or warranty for this information). If you have questions about a medical condition or this instruction, always ask your healthcare professional. [place] [address] any warranty or liability for your use of this information.

## 2022-06-24 RX ORDER — OMEPRAZOLE 20 MG/1
20 CAPSULE, DELAYED RELEASE ORAL 2 TIMES DAILY
Qty: 180 CAPSULE | Refills: 1 | Status: SHIPPED | OUTPATIENT
Start: 2022-06-24

## 2022-07-01 RX ORDER — ALFUZOSIN HYDROCHLORIDE 10 MG/1
TABLET, EXTENDED RELEASE ORAL
Qty: 90 TABLET | Refills: 3 | Status: SHIPPED | OUTPATIENT
Start: 2022-07-01

## 2022-07-22 ENCOUNTER — HOSPITAL ENCOUNTER (OUTPATIENT)
Age: 77
Discharge: HOME | End: 2022-07-22
Attending: INTERNAL MEDICINE
Payer: MEDICARE

## 2022-07-22 DIAGNOSIS — K62.89 RECTAL PAIN: ICD-10-CM

## 2022-07-22 LAB — CREAT UR-MCNC: 1.1 MG/DL (ref 0.6–1.3)

## 2022-07-22 PROCEDURE — 82565 ASSAY OF CREATININE: CPT

## 2022-07-22 PROCEDURE — 72197 MRI PELVIS W/O & W/DYE: CPT

## 2022-07-22 PROCEDURE — A9577 INJ MULTIHANCE: HCPCS | Performed by: INTERNAL MEDICINE

## 2022-07-22 PROCEDURE — 74011250636 HC RX REV CODE- 250/636: Performed by: INTERNAL MEDICINE

## 2022-07-22 RX ADMIN — GADOBENATE DIMEGLUMINE 20 ML: 529 INJECTION, SOLUTION INTRAVENOUS at 16:30

## 2022-08-15 ENCOUNTER — OFFICE VISIT (OUTPATIENT)
Dept: CARDIOLOGY CLINIC | Age: 77
End: 2022-08-15
Payer: MEDICARE

## 2022-08-15 VITALS
WEIGHT: 158 LBS | HEART RATE: 68 BPM | BODY MASS INDEX: 23.95 KG/M2 | DIASTOLIC BLOOD PRESSURE: 68 MMHG | HEIGHT: 68 IN | OXYGEN SATURATION: 99 % | SYSTOLIC BLOOD PRESSURE: 110 MMHG

## 2022-08-15 DIAGNOSIS — E78.00 PURE HYPERCHOLESTEROLEMIA: ICD-10-CM

## 2022-08-15 DIAGNOSIS — I10 ESSENTIAL HYPERTENSION WITH GOAL BLOOD PRESSURE LESS THAN 140/90: Primary | ICD-10-CM

## 2022-08-15 PROCEDURE — 99213 OFFICE O/P EST LOW 20 MIN: CPT | Performed by: INTERNAL MEDICINE

## 2022-08-15 PROCEDURE — 3017F COLORECTAL CA SCREEN DOC REV: CPT | Performed by: INTERNAL MEDICINE

## 2022-08-15 PROCEDURE — G8752 SYS BP LESS 140: HCPCS | Performed by: INTERNAL MEDICINE

## 2022-08-15 PROCEDURE — 1101F PT FALLS ASSESS-DOCD LE1/YR: CPT | Performed by: INTERNAL MEDICINE

## 2022-08-15 PROCEDURE — G8428 CUR MEDS NOT DOCUMENT: HCPCS | Performed by: INTERNAL MEDICINE

## 2022-08-15 PROCEDURE — G8432 DEP SCR NOT DOC, RNG: HCPCS | Performed by: INTERNAL MEDICINE

## 2022-08-15 PROCEDURE — G8420 CALC BMI NORM PARAMETERS: HCPCS | Performed by: INTERNAL MEDICINE

## 2022-08-15 PROCEDURE — G8536 NO DOC ELDER MAL SCRN: HCPCS | Performed by: INTERNAL MEDICINE

## 2022-08-15 PROCEDURE — G8754 DIAS BP LESS 90: HCPCS | Performed by: INTERNAL MEDICINE

## 2022-08-15 PROCEDURE — 1123F ACP DISCUSS/DSCN MKR DOCD: CPT | Performed by: INTERNAL MEDICINE

## 2022-08-15 NOTE — LETTER
8/15/2022    Patient: Steven Torres   YOB: 1945   Date of Visit: 8/15/2022     Lawrence Wright, [address] [facility]  [address]  [address] [place] [address]  [address] [facility] [address]    Dear Lawrence Wright MD,      Thank you for referring Mr. Miranda Duran to [facility] for evaluation. My notes for this consultation are attached. If you have questions, please do not hesitate to call me. I look forward to following your patient along with you.       Sincerely,    Rebecca Lou MD

## 2022-08-15 NOTE — PROGRESS NOTES
[employer] presents today for   Chief Complaint   Patient presents with    Follow-up     9 months        [employer] preferred language for health care discussion is english/other. Is someone accompanying this pt? no    Is the patient using any DME equipment during [address]? no    Depression Screening:  3 most recent PHQ Screens 5/25/2022   Little interest or pleasure in doing things Not at all   Feeling down, depressed, irritable, or hopeless Not at all   Total Score PHQ 2 0       Learning Assessment:  Learning Assessment 10/1/2018   PRIMARY LEARNER Patient   HIGHEST LEVEL OF EDUCATION - PRIMARY LEARNER  4 YEARS OF COLLEGE   BARRIERS PRIMARY LEARNER NONE   CO-LEARNER CAREGIVER No   PRIMARY LANGUAGE ENGLISH    NEED No   LEARNER PREFERENCE PRIMARY DEMONSTRATION   LEARNING SPECIAL TOPICS No   ANSWERED BY patient   RELATIONSHIP SELF       Abuse Screening:  Abuse Screening Questionnaire 7/1/2020   Do you ever feel afraid of your partner? N   Are you in a relationship with someone who physically or mentally threatens you? N   Is it safe for you to go home? Y       Fall Risk  Fall Risk Assessment, last 12 mths 5/25/2022   Able to walk? Yes   Fall in past 12 months? 1   Do you feel unsteady? 0   Are you worried about falling 0   Is TUG test greater than 12 seconds? -   Is the gait abnormal? -   Number of falls in past 12 months 2   Fall with injury? 0       Pt currently taking Anticoagulant therapy? no    Coordination of Care:  1. Have you been to the ER, urgent care clinic since your last visit? Hospitalized since your last visit? no    2. Have you seen or consulted any other health care providers outside of the [address] [place] [address] since your last visit? Include any pap smears or colon screening.  No

## 2022-08-15 NOTE — PROGRESS NOTES
[facility]    Mr. Sindy Lawson is 78 y. o. with a history of hypertension, hyperlipidemia and other medical problems    Patient is here today for follow-up appointment for his hypertension and hyperlipidemia. He denies any prior history of MI and CHF. He is hard of hearing. He denies any significant chest pain or chest tightness since last visit. He denies any hospital admission or ER visit. Denies any significant palpitation. No significant shortness of breath. Denies any PND or lower extremity swelling. Denies any nausea, vomiting, abdominal pain, fever, chills, sputum production. No hematuria or other bleeding complaints    Past Medical History:   Diagnosis Date    Basal cell carcinoma     BPH (benign prostatic hyperplasia)     Depression     Gastrointestinal disorder     Diverticulitis    GERD (gastroesophageal reflux disease)     History of colon polyps 2/21/2019    HTN (hypertension)     Hypertension     Inguinal hernia     left side    Psychiatric disorder     Aggression, Depression       Review of Systems:  Cardiac symptoms as noted above in HPI. All others negative. Denies fatigue, malaise, skin rash, joint pain, blurring vision, photophobia, neck pain, hemoptysis, chronic cough, nausea, vomiting, hematuria, burning micturition, BRBPR, chronic headaches. Current Outpatient Medications   Medication Sig    alfuzosin SR (UROXATRAL) 10 mg SR tablet TAKE 1 TABLET DAILY AFTER DINNER    omeprazole (PRILOSEC) 20 mg capsule Take 1 Capsule by mouth two (2) times a day. finasteride (PROSCAR) 5 mg tablet TAKE 1 TABLET DAILY    atorvastatin (LIPITOR) 20 mg tablet TAKE 1 TABLET NIGHTLY    lisinopriL (PRINIVIL, ZESTRIL) 10 mg tablet TAKE 1 TABLET DAILY    nitroglycerin (NITROSTAT) 0.4 mg SL tablet 1 Tablet by SubLINGual route every five (5) minutes as needed for Chest Pain. Up to 3 doses.     fluticasone propionate (FLONASE) 50 mcg/actuation nasal spray 2 Sprays by Both Nostrils route daily. ibuprofen (MOTRIN) 200 mg tablet Take 400 mg by mouth every six (6) hours as needed for Pain. acetaminophen (TYLENOL) 500 mg tablet Take  by mouth every six (6) hours as needed for Pain. No current facility-administered medications for this visit. Past Surgical History:   Procedure Laterality Date    COLONOSCOPY N/A 2/22/2019    COLONOSCOPY, SURVEILLANCE performed by Lucy Watkins MD at [address]      NAOMI COLONOSCOPY      HX HERNIA REPAIR  03/14/2017    HX MALIGNANT SKIN LESION EXCISION         Allergies and Sensitivities:  No Known Allergies    Family History:  Family History   Problem Relation Age of Onset    Prostate Cancer Father        Social History:  Social History     Tobacco Use    Smoking status: Former    Smokeless tobacco: Never    Tobacco comments:     quit 1971   Substance Use Topics    Alcohol use: Yes     Alcohol/week: 1.7 standard drinks     Types: 2 Cans of beer per week     Comment: wine 2-3 x per week with dinner    Drug use: No     He  reports that he has quit smoking. He has never used smokeless tobacco.  He  reports current alcohol use of about 1.7 standard drinks per week. Physical Exam:  BP Readings from Last 3 Encounters:   08/15/22 110/68   05/25/22 128/60   01/25/22 120/62         Pulse Readings from Last 3 Encounters:   08/15/22 68   05/25/22 70   01/25/22 86          Wt Readings from Last 3 Encounters:   08/15/22 71.7 kg (158 lb)   05/25/22 71.4 kg (157 lb 6.4 oz)   01/25/22 71.2 kg (157 lb)       Constitutional: Oriented to person, place, and time. HENT: Head: Normocephalic and atraumatic. Neck: No JVD present. Carotid bruit is not appreciated. Cardiovascular: Regular rhythm. No murmur, gallop or rubs appreciated  Lung: Breath sounds normal. No respiratory distress. No ronchi or rales appreciated  Abdominal: No tenderness. No rebound and no guarding. Musculoskeletal: There is no lower extremity edema. No cynosis    LABS:   @  Lab Results   Component Value Date/Time    WBC 5.7 2021 03:51 PM    HGB 13.6 2021 03:51 PM    HCT 40.8 2021 03:51 PM    PLATELET 438  03:51 PM    MCV 95.1 2021 03:51 PM     Lab Results   Component Value Date/Time    Sodium 142 2021 08:12 AM    Potassium 4.5 2021 08:12 AM    Chloride 108 2021 08:12 AM    CO2 28 2021 08:12 AM    Glucose 93 2021 08:12 AM    BUN 21 (H) 2021 08:12 AM    Creatinine 1.05 2021 08:12 AM     Lipids Latest Ref Rng & Units 12/3/2021 6/3/2020 2019   Chol, Total <200 MG/ 177 151   HDL 40 - 60 MG/DL 92(H) 100(H) 102(H)   LDL 0 - 100 MG/DL 49 63.8 38.2   Trig <150 MG/DL 70 66 54   Chol/HDL Ratio 0 - 5.0   1.7 1.8 1.5   Some recent data might be hidden     Lab Results   Component Value Date/Time    ALT (SGPT) 17 2021 08:12 AM     Lab Results   Component Value Date/Time    Hemoglobin A1c 5.6 2021 08:12 AM     Lab Results   Component Value Date/Time    TSH 1.01 2020 08:14 AM     EK2021: Sinus rhythm at 66 bpm.  Normal EKG. 10/20/21    ECHO ADULT COMPLETE 10/20/2021 10/20/2021  Interpretation Summary  · LV: Estimated LVEF is 55 - 60%. Visually measured ejection fraction. Normal cavity size, wall thickness and systolic function (ejection fraction normal). Wall motion: normal. Age-appropriate left ventricular diastolic function. · TV: Mild to moderate tricuspid valve regurgitation is present. · AO: Moderate aortic root dilatation; diameter is 4 cm. · RA: Moderately dilated right atrium. · RV: Mildly dilated right ventricle. · IVC: Moderately elevated central venous pressure (8 mmHg); IVC diameter is larger than 21 mm and collapses more than 50% with respiration. NUCLEAR CARDIAC STRESS TEST 10/20/2021 10/20/2021  Interpretation Summary  · Baseline ECG: Sinus bradycardia.   · Stress test: Positive stress test. Low risk Beltre treadmill score. · SPECT: Left ventricular function post-stress was normal. Calculated ejection fraction is 65% (normal EF value is equal to or greater than 50%). Left ventricular wall motion was normal at stress, no regional wall motion abnormality noted. The TID ratio is 0.98.  · SPECT: Left ventricular perfusion is normal.  · SPECT: Left ventricular perfusion is normal. Myocardial perfusion imaging supports a low risk stress test.    IMPRESSION & PLAN:  Mr. Lindsay Ayers is 78 y.o. male    History of chest pain/dyspnea  Denies any symptoms to suggest angina or heart failure since last time. Denies any hospital admission or ER visit. Denies any use of nitroglycerin. Nuclear stress test and echocardiogram in 10/2021, low risk. Hypertension:  /68. Currently on lisinopril. Continue same. Hyperlipidemia:  Currently on atorvastatin 20 mg daily. Last LDL 63. Continue same. Recommend to repeat fasting lipid profile before next visit    This plan was discussed with patient who is in agreement. Thank you for allowing me to participate in patient care. Please feel free to call me if you have any question or concern. Erickson [place] MD  Please note: This document has been produced using voice recognition software. Unrecognized errors in transcription may be present.

## 2022-10-27 RX ORDER — FINASTERIDE 5 MG/1
TABLET, FILM COATED ORAL
Qty: 90 TABLET | Refills: 1 | Status: SHIPPED | OUTPATIENT
Start: 2022-10-27

## 2022-10-27 NOTE — TELEPHONE ENCOUNTER
This pharmacy faxed over request for the following prescriptions to be filled:    Medication requested :   Requested Prescriptions     Pending Prescriptions Disp Refills    finasteride (PROSCAR) 5 mg tablet 90 Tablet 1     Sig: TAKE 1 TABLET DAILY     PCP: [address] [facility] or Print: [facility]  Mail order or Local pharmacy Mail Order     Scheduled appointment if not seen by current providers in office: LOV 5/25/2022 F/U 2/3/2023

## 2023-02-03 ENCOUNTER — OFFICE VISIT (OUTPATIENT)
Dept: FAMILY MEDICINE CLINIC | Age: 78
End: 2023-02-03
Payer: MEDICARE

## 2023-02-03 VITALS
RESPIRATION RATE: 16 BRPM | SYSTOLIC BLOOD PRESSURE: 130 MMHG | HEIGHT: 68 IN | DIASTOLIC BLOOD PRESSURE: 68 MMHG | HEART RATE: 72 BPM | OXYGEN SATURATION: 98 % | BODY MASS INDEX: 22.55 KG/M2 | TEMPERATURE: 97.9 F | WEIGHT: 148.8 LBS

## 2023-02-03 DIAGNOSIS — I10 PRIMARY HYPERTENSION: ICD-10-CM

## 2023-02-03 DIAGNOSIS — R35.0 FREQUENCY OF URINATION: ICD-10-CM

## 2023-02-03 DIAGNOSIS — Z13.220 SCREENING FOR HYPERLIPIDEMIA: ICD-10-CM

## 2023-02-03 DIAGNOSIS — Z00.00 MEDICARE ANNUAL WELLNESS VISIT, SUBSEQUENT: ICD-10-CM

## 2023-02-03 DIAGNOSIS — I10 ESSENTIAL HYPERTENSION: Primary | ICD-10-CM

## 2023-02-03 DIAGNOSIS — E78.00 PURE HYPERCHOLESTEROLEMIA: ICD-10-CM

## 2023-02-03 DIAGNOSIS — Z86.010 HISTORY OF COLON POLYPS: ICD-10-CM

## 2023-02-03 DIAGNOSIS — R73.01 IMPAIRED FASTING BLOOD SUGAR: ICD-10-CM

## 2023-02-03 DIAGNOSIS — Z96.652 STATUS POST LEFT KNEE REPLACEMENT: ICD-10-CM

## 2023-02-03 DIAGNOSIS — H91.93 HEARING PROBLEM OF BOTH EARS: ICD-10-CM

## 2023-02-03 DIAGNOSIS — N40.0 BENIGN PROSTATIC HYPERPLASIA WITHOUT LOWER URINARY TRACT SYMPTOMS: ICD-10-CM

## 2023-02-03 RX ORDER — PROMETHAZINE HYDROCHLORIDE 12.5 MG/1
TABLET ORAL
COMMUNITY
Start: 2022-12-22 | End: 2023-02-03

## 2023-02-03 RX ORDER — MELOXICAM 15 MG/1
TABLET ORAL
COMMUNITY
Start: 2022-12-23

## 2023-02-03 RX ORDER — NALOXONE HYDROCHLORIDE 4 MG/.1ML
SPRAY NASAL
COMMUNITY
Start: 2022-12-22

## 2023-02-03 RX ORDER — HYDROCODONE BITARTRATE AND ACETAMINOPHEN 5; 325 MG/1; MG/1
TABLET ORAL
COMMUNITY
Start: 2023-01-19

## 2023-02-03 RX ORDER — TRAMADOL HYDROCHLORIDE 50 MG/1
TABLET ORAL
COMMUNITY
Start: 2023-01-16

## 2023-02-03 RX ORDER — DOCUSATE SODIUM 100 MG
CAPSULE ORAL
COMMUNITY
Start: 2022-12-22

## 2023-02-03 NOTE — PATIENT INSTRUCTIONS
Medicare Wellness Visit, Male    The best way to live healthy is to have a lifestyle where you eat a well-balanced diet, exercise regularly, limit alcohol use, and quit all forms of tobacco/nicotine, if applicable. Regular preventive services are another way to keep healthy. Preventive services (vaccines, screening tests, monitoring & exams) can help personalize your care plan, which helps you manage your own care. Screening tests can find health problems at the earliest stages, when they are easiest to treat. [facility] follows the current, evidence-based guidelines published by the [facility] ([facility]) when recommending preventive services for our patients. Because we follow these guidelines, sometimes recommendations change over time as research supports it. (For example, a prostate screening blood test is no longer routinely recommended for men with no symptoms). Of course, you and your doctor may decide to screen more often for some diseases, based on your risk and co-morbidities (chronic disease you are already diagnosed with). Preventive services for you include:  - Medicare offers their members a free annual wellness visit, which is time for you and your primary care provider to discuss and plan for your preventive service needs.  Take advantage of this benefit every year!    -Over the age of 72 should receive the recommended pneumonia vaccines.   -All adults should have a flu vaccine yearly.  -All adults should receive a tetanus vaccine every 10 years.  -Over the age of 48 should receive the shingles vaccines.    -All adults should be screened once for Hepatitis C.  -All adults age 38-68 who are overweight should have a diabetes screening test once every three years.   -Other screening tests & preventive services for persons with diabetes include: an eye exam to screen for diabetic retinopathy, a kidney function test, a foot exam, and stricter control over your cholesterol.   -Cardiovascular screening for adults with routine risk involves an electrocardiogram (ECG) at intervals determined by the provider.     -Colorectal cancer screening should be done for adults age 43-69 with no increased risk factors for colorectal cancer. There are a number of acceptable methods of screening for this type of cancer. Each test has its own benefits and drawbacks. Discuss with your provider what is most appropriate for you during your annual wellness visit. The different tests include: colonoscopy (considered the best screening method), a fecal occult blood test, a fecal DNA test, and sigmoidoscopy.    -Lung cancer screening is recommended annually with a low dose CT scan for adults between age 54 and 68, who have smoked at least 30 pack years (equivalent of 1 pack per day for 30 days), and who is a current smoker or quit less than 15 years ago. -An Abdominal Aortic Aneurysm (AAA) Screening is recommended for men age 73-68 who has ever smoked in their lifetime.      Here is a list of your current Health Maintenance items (your personalized list of preventive services) with a due date:  Health Maintenance Due   Topic Date Due    Shingles Vaccine (1 of 2) Never done    Cholesterol Test   12/03/2022

## 2023-02-03 NOTE — ACP (ADVANCE CARE PLANNING)
Advance Care Planning     General Advance Care Planning (ACP) Conversation      Date of Conversation: 2/3/2023  Conducted with: Patient with Decision Making Capacity    Healthcare Decision Maker:     Primary Decision Maker: jatinder mclaughlin - Daughter - [phone]  Click here to complete [address] including selection of the Healthcare Decision Maker Relationship (ie \"Primary\")    Today we discussed advance directive. Wants to be a DNR.  He will provide a paper work     Content/Action Overview:   See above       Length of Voluntary ACP Conversation in minutes:  <16 minutes (Non-Billable)    Ian Lopez MD

## 2023-02-03 NOTE — PROGRESS NOTES
1. \"Have you been to the ER, urgent care clinic since your last visit? Hospitalized since your last visit? \" Yes When: Rip Grout - left knee surgery on 1/04/23. 2. \"Have you seen or consulted any other health care providers outside of the [address] [place] [address] since your last visit? \" Yes When: Dr. Emeli Glaser Surgery follow-up 1/19/23 with KARLA Bhatt. Patient has another follow-up scheduled on 2/16/23. 3. For patients aged 39-70: Has the patient had a colonoscopy / FIT/ Cologuard? NA - based on age      Chief Complaint   Patient presents with    Annual Wellness Visit    Hypertension    Rectal Pain     Follow-up    Knee Pain     Pain in both knees - follow-up     3 most recent PHQ Screens 2/3/2023   Little interest or pleasure in doing things Not at all   Feeling down, depressed, irritable, or hopeless Several days   Total Score PHQ 2 1   Trouble falling or staying asleep, or sleeping too much -   Feeling tired or having little energy -   Poor appetite, weight loss, or overeating -   Feeling bad about yourself - or that you are a failure or have let yourself or your family down -   Trouble concentrating on things such as school, work, reading, or watching TV -   Moving or speaking so slowly that other people could have noticed; or the opposite being so fidgety that others notice -   Thoughts of being better off dead, or hurting yourself in some way -   PHQ 9 Score -   How difficult have these problems made it for you to do your work, take care of your home and get along with others -      Fall Risk Assessment, last 12 mths 2/3/2023   Able to walk? Yes   Fall in past 12 months? 1   Do you feel unsteady? 1   Are you worried about falling 0   Is TUG test greater than 12 seconds? -   Is the gait abnormal? -   Number of falls in past 12 months 1   Fall with injury? 0       Abuse Screening Questionnaire 1/31/2023   Do you ever feel afraid of your partner?  N   Are you in a relationship with someone who physically or mentally threatens you? N   Is it safe for you to go home?  Y      Learning Assessment 2/3/2023   PRIMARY LEARNER Patient   HIGHEST LEVEL OF EDUCATION - PRIMARY LEARNER  4 YEARS OF COLLEGE   BARRIERS PRIMARY LEARNER HEARING   CO-LEARNER CAREGIVER No   PRIMARY LANGUAGE ENGLISH    NEED No   LEARNER PREFERENCE PRIMARY DEMONSTRATION   LEARNING SPECIAL TOPICS No   ANSWERED BY patient   RELATIONSHIP SELF

## 2023-02-03 NOTE — PROGRESS NOTES
HISTORY OF PRESENT ILLNESS  Brenda Diaz is a 78 y.o. male. HPI: Here for follow-up. Recently went through knee replacement. Left knee. Under physical therapy. Will slow progress. Said pain at nighttime wakes him up. During daytime much better. No swelling. Surgical scar appears well. Able to walk with cane. No recent fall or injury. History of BPH. On and off frequency of urination. No dysuria. No blood in urine. No nausea vomiting or abdominal pain. No fever. No signs of infection. Following urology    Due to surgery does not want to go all the way to [facility]. Wants me to check PSA. Denies any headache, dizziness, no chest pain or trouble breathing, no arm or leg weakness. No nausea or vomiting, no weight or appetite changes, no mood changes . No  bowel complains, no palpitation, no diaphoresis. No abdominal pain. No cold or cough. No leg swelling. No fever. No sleep trouble. Visit Vitals  /68 (BP 1 Location: Left upper arm, BP Patient Position: Sitting, BP Cuff Size: Adult)   Pulse 72   Temp 97.9 °F (36.6 °C) (Temporal)   Resp 16   Ht 5' 8\" (1.727 m)   Wt 148 lb 12.8 oz (67.5 kg)   SpO2 98%   BMI 22.62 kg/m²     Review medication list, vitals, problem list,allergies.    Lab Results   Component Value Date/Time    WBC 5.7 09/13/2021 03:51 PM    HGB 13.6 09/13/2021 03:51 PM    HCT 40.8 09/13/2021 03:51 PM    PLATELET 885 33/49/6895 03:51 PM    MCV 95.1 09/13/2021 03:51 PM     Lab Results   Component Value Date/Time    Cholesterol, total 155 12/03/2021 08:12 AM    HDL Cholesterol 92 (H) 12/03/2021 08:12 AM    LDL, calculated 49 12/03/2021 08:12 AM    Triglyceride 70 12/03/2021 08:12 AM    CHOL/HDL Ratio 1.7 12/03/2021 08:12 AM     Lab Results   Component Value Date/Time    TSH 1.01 08/24/2020 08:14 AM      Lab Results   Component Value Date/Time    Sodium 142 12/03/2021 08:12 AM    Potassium 4.5 12/03/2021 08:12 AM    Chloride 108 12/03/2021 08:12 AM    CO2 28 12/03/2021 08:12 AM Anion gap 6 12/03/2021 08:12 AM    Glucose 93 12/03/2021 08:12 AM    BUN 21 (H) 12/03/2021 08:12 AM    Creatinine 1.05 12/03/2021 08:12 AM    BUN/Creatinine ratio 20 12/03/2021 08:12 AM    GFR est AA >60 12/03/2021 08:12 AM    GFR est non-AA >60 12/03/2021 08:12 AM    Calcium 9.2 12/03/2021 08:12 AM      Lab Results   Component Value Date/Time    Sodium 142 12/03/2021 08:12 AM    Potassium 4.5 12/03/2021 08:12 AM    Chloride 108 12/03/2021 08:12 AM    CO2 28 12/03/2021 08:12 AM    Anion gap 6 12/03/2021 08:12 AM    Glucose 93 12/03/2021 08:12 AM    BUN 21 (H) 12/03/2021 08:12 AM    Creatinine 1.05 12/03/2021 08:12 AM    BUN/Creatinine ratio 20 12/03/2021 08:12 AM    GFR est AA >60 12/03/2021 08:12 AM    GFR est non-AA >60 12/03/2021 08:12 AM    Calcium 9.2 12/03/2021 08:12 AM    Bilirubin, total 0.6 12/03/2021 08:12 AM    ALT (SGPT) 17 12/03/2021 08:12 AM    Alk. phosphatase 76 12/03/2021 08:12 AM    Protein, total 6.6 12/03/2021 08:12 AM    Albumin 3.7 12/03/2021 08:12 AM    Globulin 2.9 12/03/2021 08:12 AM    A-G Ratio 1.3 12/03/2021 08:12 AM      Lab Results   Component Value Date/Time    Hemoglobin A1c 5.6 12/03/2021 08:12 AM       Lab Results   Component Value Date/Time    Prostate Specific Ag 1.6 12/03/2021 08:12 AM    Prostate Specific Ag 1.7 11/18/2019 10:30 AM    Prostate Specific Ag 2.48 06/05/2018 02:53 PM    Prostate Specific Ag 2.62 01/05/2017 01:55 PM    Prostate Specific Ag 1.9 10/17/2013 12:00 AM         ROS: See HPI    Physical Exam  Constitutional:       General: He is not in acute distress. Cardiovascular:      Rate and Rhythm: Normal rate and regular rhythm. Heart sounds: Normal heart sounds. Abdominal:      General: Bowel sounds are normal.      Palpations: Abdomen is soft. Tenderness: There is no abdominal tenderness. Musculoskeletal:         General: No swelling. Cervical back: Neck supple. Comments: Left knee: Surgical scar healed well.   No swelling or redness Neurological:      Mental Status: He is oriented to person, place, and time. Psychiatric:         Behavior: Behavior normal.       ASSESSMENT and PLAN    ICD-10-CM ICD-9-CM    1. Essential hypertension:well controlled. Continue current dose of medication and low salt diet. Exercise as tolerated. X89 673.9 METABOLIC PANEL, COMPREHENSIVE      2. Benign prostatic hyperplasia without lower urinary tract symptoms: Checking labs asymptomatic except urine frequency on and off N40.0 600.00 PSA, TOTAL &  FREE      3. Hearing problem of both ears: Suggested cochlear implant but does not want to do that. Hearing is getting worse. Wants to observe for now H91.93 V41.2       4. History of colon polyps  Z86.010 V12.72       5. Pure hypercholesterolemia: On statin diet and lifestyle modification. No concern E78.00 272.0       6. Status post left knee replacement  Z96.652 V43.65       7. Medicare annual wellness visit, subsequent  Z00.00 V70.0       8. Screening for hyperlipidemia  Z13.220 V77.91 LIPID PANEL      9. Frequency of urination  R35.0 788.41 PSA, TOTAL &  FREE      HEMOGLOBIN A1C WITH EAG      URINALYSIS W/ RFLX MICROSCOPIC      10. Impaired fasting blood sugar: Checking labs. Diet and lifestyle modification R73.01 790.21 HEMOGLOBIN A1C WITH EAG      11. Hypertension:well controlled. Continue current dose of medication and low salt diet. Exercise as tolerated. Patient understood agreed with the plan  Will get shingles shot if decides to from the pharmacy  Follow-up and Dispositions    Return in about 6 months (around 8/3/2023). Please note that this dictation was completed with [employer], the [employer] voice recognition software. Quite often unanticipated grammatical, syntax, homophones, and other interpretive errors are inadvertently transcribed by the computer software. Please disregard these errors. Please excuse any errors that have escaped final proofreading.

## 2023-02-03 NOTE — PROGRESS NOTES
This is the Subsequent Medicare Annual Wellness Exam, performed 12 months or more after the Initial AWV or the last Subsequent AWV    I have reviewed the patient's medical history in detail and updated the computerized patient record. Assessment/Plan   Education and counseling provided:  Are appropriate based on today's review and evaluation  Discussed prior health plan. Discussed advanced directive. See ACP note from today. He wants to be a DNR. He will complete the form  1. Essential hypertension  2. Benign prostatic hyperplasia without lower urinary tract symptoms  3. Hearing problem of both ears  4. History of colon polyps  5. Pure hypercholesterolemia  6. Status post left knee replacement  7.  Medicare annual wellness visit, subsequent       Depression Risk Factor Screening     3 most recent PHQ Screens 2/3/2023   Little interest or pleasure in doing things Not at all   Feeling down, depressed, irritable, or hopeless Several days   Total Score PHQ 2 1   Trouble falling or staying asleep, or sleeping too much -   Feeling tired or having little energy -   Poor appetite, weight loss, or overeating -   Feeling bad about yourself - or that you are a failure or have let yourself or your family down -   Trouble concentrating on things such as school, work, reading, or watching TV -   Moving or speaking so slowly that other people could have noticed; or the opposite being so fidgety that others notice -   Thoughts of being better off dead, or hurting yourself in some way -   PHQ 9 Score -   How difficult have these problems made it for you to do your work, take care of your home and get along with others -       Alcohol & Drug Abuse Risk Screen   Do you average more than 1 drink per night or more than 7 drinks a week?: (P) No  In the past three months have you had more than 4 drinks containing alcohol on one occasion?: (P) No         Opioid Risk: (Low risk score <55, High risk score ?55)  Opioid risk score: 3      Click here to complete the Controlled Substance Monitoring SmartForm    Last PDMP [place] as Reviewed:  Review User Review Instant Review Result              Functional Ability and Level of Safety   Hearing:  Hearing: (P) Patient wears hearing aid      Activities of Daily Living: The home contains: (P) grab bars  Functional ADLs: (P) Patient does total self care     Ambulation:  Patient ambulates: (P) with mild difficulty  How far the patient can walk with difficulty: (P) TKR 4 January 2023  Walking is difficult due to: (P) pain  Walking aids: (P) cane     Fall Risk:  Fall Risk Assessment, last 12 mths 2/3/2023   Able to walk? Yes   Fall in past 12 months? 1   Do you feel unsteady? 1   Are you worried about falling 0   Is TUG test greater than 12 seconds? -   Is the gait abnormal? -   Number of falls in past 12 months 1   Fall with injury?  0     Abuse Screen:  Do you ever feel afraid of your partner?: (P) No  Are you in a relationship with someone who physically or mentally threatens you?: (P) No  Is it safe for you to go home?: (P) Yes        Cognitive Screening   Has your family/caregiver stated any concerns about your memory?: (P) No       Health Maintenance Due     Health Maintenance Due   Topic Date Due    Shingles Vaccine (1 of 2) Never done    Lipid Screen  12/03/2022       Patient Care Team   Patient Care Team:  Lindy Hull MD as PCP - General (Family Medicine)  Lindy Hull MD as PCP - [facility]  Dontae Jensen MD (Orthopedic Surgery)  Beth Salazar MD (Otolaryngology)  Mely Marx MD as Physician (Urology)  Beth Salazar MD (Otolaryngology)  Arabella Dumont MD (Ophthalmology)    History     Patient Active Problem List   Diagnosis Code    Benign prostatic hyperplasia N40.0    Groin pain R10.30    Urinary frequency R35.0    Hernia K46.9    Left lower quadrant pain R10.32    Groin fluid collection R18.8    Hearing problem of both ears H91.93    Arthritis M19.90    Hyperlipidemia E78.5    Essential hypertension I10    History of colon polyps Z86.010    Kidney stone N20.0    Pain in both knees M25.561, M25.562     Past Medical History:   Diagnosis Date    Basal cell carcinoma     BPH (benign prostatic hyperplasia)     Depression     Gastrointestinal disorder     Diverticulitis    GERD (gastroesophageal reflux disease)     History of colon polyps 2/21/2019    HTN (hypertension)     Hypertension     Inguinal hernia     left side    Psychiatric disorder     Aggression, Depression      Past Surgical History:   Procedure Laterality Date    COLONOSCOPY N/A 2/22/2019    COLONOSCOPY, SURVEILLANCE performed by Kate Verdugo MD at [facility] ENDOSCOPY    HX [address]      HX COLONOSCOPY      HX HERNIA REPAIR  03/14/2017    HX MALIGNANT SKIN LESION EXCISION       Current Outpatient Medications   Medication Sig Dispense Refill    traMADoL (ULTRAM) 50 mg tablet TAKE 1-2 TABLETS BY MOUTH AS NEEDED FOR PAIN EVERY 8 HOURS PRN PAIN 7 DAYS      meloxicam (MOBIC) 15 mg tablet TAKE 1 TABLET BY MOUTH EVERY DAY FOR 30 DAYS      HYDROcodone-acetaminophen (NORCO) 5-325 mg per tablet 1 - 2 TABLETS FOR PAIN ORALLY EVERY 6 HRS AS NEEDED 7 DAYS      Stool Softener 100 mg capsule       aspirin (ASPIR-81 PO)       omeprazole (PRILOSEC) 20 mg capsule TAKE 1 CAPSULE TWICE A  Capsule 1    finasteride (PROSCAR) 5 mg tablet TAKE 1 TABLET DAILY 90 Tablet 1    atorvastatin (LIPITOR) 20 mg tablet TAKE 1 TABLET NIGHTLY 90 Tablet 1    lisinopriL (PRINIVIL, ZESTRIL) 10 mg tablet TAKE 1 TABLET DAILY 90 Tablet 1    alfuzosin SR (UROXATRAL) 10 mg SR tablet TAKE 1 TABLET DAILY AFTER DINNER 90 Tablet 3    nitroglycerin (NITROSTAT) 0.4 mg SL tablet 1 Tablet by SubLINGual route every five (5) minutes as needed for Chest Pain. Up to 3 doses. 25 Tablet 2    fluticasone propionate (FLONASE) 50 mcg/actuation nasal spray 2 Sprays by Both Nostrils route daily.       acetaminophen (TYLENOL) 500 mg tablet Take  by mouth every six (6) hours as needed for Pain.      naloxone (NARCAN) 4 mg/actuation nasal spray PLEASE SEE ATTACHED FOR DETAILED DIRECTIONS (Patient not taking: Reported on 2/3/2023)       Allergies   Allergen Reactions    Oxycodone Other (comments)     hallucination       Family History   Problem Relation Age of Onset    Prostate Cancer Father      Social History     Tobacco Use    Smoking status: Former    Smokeless tobacco: Never    Tobacco comments:     quit 1971   Substance Use Topics    Alcohol use: Not Currently     Alcohol/week: 1.7 standard drinks     Types: 2 Cans of beer per week     Comment: wine 2-3 x per week with dinner         Ole Siddiqui MD

## 2023-02-09 ENCOUNTER — HOSPITAL ENCOUNTER (OUTPATIENT)
Dept: LAB | Age: 78
Discharge: HOME | End: 2023-02-09
Payer: MEDICARE

## 2023-02-09 DIAGNOSIS — R73.01 IMPAIRED FASTING BLOOD SUGAR: ICD-10-CM

## 2023-02-09 DIAGNOSIS — Z13.220 SCREENING FOR HYPERLIPIDEMIA: ICD-10-CM

## 2023-02-09 DIAGNOSIS — I10 ESSENTIAL HYPERTENSION: ICD-10-CM

## 2023-02-09 DIAGNOSIS — N40.0 BENIGN PROSTATIC HYPERPLASIA WITHOUT LOWER URINARY TRACT SYMPTOMS: ICD-10-CM

## 2023-02-09 DIAGNOSIS — R35.0 FREQUENCY OF URINATION: ICD-10-CM

## 2023-02-09 LAB
ALBUMIN SERPL-MCNC: 3.8 G/DL (ref 3.4–5)
ALBUMIN/GLOB SERPL: 1.4 (ref 0.8–1.7)
ALP SERPL-CCNC: 81 U/L (ref 45–117)
ALT SERPL-CCNC: 12 U/L (ref 16–61)
ANION GAP SERPL CALC-SCNC: 6 MMOL/L (ref 3–18)
APPEARANCE UR: CLEAR
AST SERPL-CCNC: 12 U/L (ref 10–38)
BILIRUB SERPL-MCNC: 0.5 MG/DL (ref 0.2–1)
BILIRUB UR QL: NEGATIVE
BUN SERPL-MCNC: 21 MG/DL (ref 7–18)
BUN/CREAT SERPL: 22 (ref 12–20)
CALCIUM SERPL-MCNC: 9.5 MG/DL (ref 8.5–10.1)
CHLORIDE SERPL-SCNC: 105 MMOL/L (ref 100–111)
CHOLEST SERPL-MCNC: 150 MG/DL
CO2 SERPL-SCNC: 27 MMOL/L (ref 21–32)
COLOR UR: YELLOW
CREAT SERPL-MCNC: 0.94 MG/DL (ref 0.6–1.3)
EST. AVERAGE GLUCOSE BLD GHB EST-MCNC: 108 MG/DL
GLOBULIN SER CALC-MCNC: 2.8 G/DL (ref 2–4)
GLUCOSE SERPL-MCNC: 96 MG/DL (ref 74–99)
GLUCOSE UR STRIP.AUTO-MCNC: NEGATIVE MG/DL
HBA1C MFR BLD: 5.4 % (ref 4.2–5.6)
HDLC SERPL-MCNC: 93 MG/DL (ref 40–60)
HDLC SERPL: 1.6 (ref 0–5)
HGB UR QL STRIP: NEGATIVE
KETONES UR QL STRIP.AUTO: NEGATIVE MG/DL
LDLC SERPL CALC-MCNC: 41.8 MG/DL (ref 0–100)
LEUKOCYTE ESTERASE UR QL STRIP.AUTO: NEGATIVE
LIPID PROFILE,FLP: ABNORMAL
NITRITE UR QL STRIP.AUTO: NEGATIVE
PH UR STRIP: 6 (ref 5–8)
POTASSIUM SERPL-SCNC: 4.4 MMOL/L (ref 3.5–5.5)
PROT SERPL-MCNC: 6.6 G/DL (ref 6.4–8.2)
PROT UR STRIP-MCNC: NEGATIVE MG/DL
SODIUM SERPL-SCNC: 138 MMOL/L (ref 136–145)
SP GR UR REFRACTOMETRY: 1.02 (ref 1–1.03)
TRIGL SERPL-MCNC: 76 MG/DL (ref ?–150)
UROBILINOGEN UR QL STRIP.AUTO: 0.2 EU/DL (ref 0.2–1)
VLDLC SERPL CALC-MCNC: 15.2 MG/DL

## 2023-02-09 PROCEDURE — 80053 COMPREHEN METABOLIC PANEL: CPT

## 2023-02-09 PROCEDURE — 81003 URINALYSIS AUTO W/O SCOPE: CPT

## 2023-02-09 PROCEDURE — 84154 ASSAY OF PSA FREE: CPT

## 2023-02-09 PROCEDURE — 80061 LIPID PANEL: CPT

## 2023-02-09 PROCEDURE — 36415 COLL VENOUS BLD VENIPUNCTURE: CPT

## 2023-02-09 PROCEDURE — 83036 HEMOGLOBIN GLYCOSYLATED A1C: CPT

## 2023-02-10 LAB
PSA FREE MFR SERPL: 21.7 %
PSA FREE SERPL-MCNC: 0.26 NG/ML
PSA SERPL-MCNC: 1.2 NG/ML (ref 0–4)

## 2023-02-10 NOTE — PROGRESS NOTES
Patient aware that labs are stable and Dr Rita Wiley will discuss in more detail at follow-up visit HIPPA info verified

## 2023-02-22 RX ORDER — LISINOPRIL 10 MG/1
TABLET ORAL
Qty: 90 TABLET | Refills: 0 | Status: SHIPPED | OUTPATIENT
Start: 2023-02-22

## 2023-04-12 RX ORDER — ATORVASTATIN CALCIUM 20 MG/1
TABLET, FILM COATED ORAL
Qty: 90 TABLET | Refills: 0 | Status: SHIPPED | OUTPATIENT
Start: 2023-04-12

## 2023-05-02 RX ORDER — FINASTERIDE 5 MG/1
TABLET, FILM COATED ORAL
Qty: 90 TABLET | Refills: 0 | Status: SHIPPED | OUTPATIENT
Start: 2023-05-02

## 2023-05-23 RX ORDER — LISINOPRIL 10 MG/1
TABLET ORAL
Qty: 90 TABLET | Refills: 0 | Status: SHIPPED | OUTPATIENT
Start: 2023-05-23

## 2023-06-26 RX ORDER — OMEPRAZOLE 20 MG/1
CAPSULE, DELAYED RELEASE ORAL
Qty: 180 CAPSULE | Refills: 0 | Status: SHIPPED | OUTPATIENT
Start: 2023-06-26

## 2023-06-26 NOTE — TELEPHONE ENCOUNTER
Requested Prescriptions     Pending Prescriptions Disp Refills    omeprazole (PRILOSEC) 20 MG delayed release capsule [Pharmacy Med Name: OMEPRAZOLE DR CAPS 20MG] 180 capsule 3     Sig: TAKE 1 CAPSULE TWICE A DAY     Last OV: 2/03/2023  Last labs: 2/09/2023  Next OV: 8/04/2023

## 2023-06-28 RX ORDER — ALFUZOSIN HYDROCHLORIDE 10 MG/1
TABLET, EXTENDED RELEASE ORAL
Qty: 90 TABLET | Refills: 0 | Status: SHIPPED | OUTPATIENT
Start: 2023-06-28

## 2023-06-28 NOTE — TELEPHONE ENCOUNTER
Requested Prescriptions     Pending Prescriptions Disp Refills    alfuzosin (UROXATRAL) 10 MG extended release tablet [Pharmacy Med Name: ALFUZOSIN HCL ER TABS 10MG] 90 tablet 3     Sig: TAKE 1 TABLET DAILY AFTER DINNER     Last OV: 2/9/2023  Last labs: 2/9/2023  Next OV: 8/4/2023

## 2023-07-06 RX ORDER — ATORVASTATIN CALCIUM 20 MG/1
TABLET, FILM COATED ORAL
Qty: 90 TABLET | Refills: 0 | Status: SHIPPED | OUTPATIENT
Start: 2023-07-06

## 2023-07-24 RX ORDER — FINASTERIDE 5 MG/1
TABLET, FILM COATED ORAL
Qty: 90 TABLET | Refills: 1 | Status: SHIPPED | OUTPATIENT
Start: 2023-07-24

## 2023-08-01 SDOH — ECONOMIC STABILITY: FOOD INSECURITY: WITHIN THE PAST 12 MONTHS, YOU WORRIED THAT YOUR FOOD WOULD RUN OUT BEFORE YOU GOT MONEY TO BUY MORE.: PATIENT DECLINED

## 2023-08-01 SDOH — ECONOMIC STABILITY: HOUSING INSECURITY
IN THE LAST 12 MONTHS, WAS THERE A TIME WHEN YOU DID NOT HAVE A STEADY PLACE TO SLEEP OR SLEPT IN A SHELTER (INCLUDING NOW)?: NO

## 2023-08-01 SDOH — ECONOMIC STABILITY: FOOD INSECURITY: WITHIN THE PAST 12 MONTHS, THE FOOD YOU BOUGHT JUST DIDN'T LAST AND YOU DIDN'T HAVE MONEY TO GET MORE.: PATIENT DECLINED

## 2023-08-01 SDOH — ECONOMIC STABILITY: INCOME INSECURITY: HOW HARD IS IT FOR YOU TO PAY FOR THE VERY BASICS LIKE FOOD, HOUSING, MEDICAL CARE, AND HEATING?: PATIENT DECLINED

## 2023-08-01 SDOH — ECONOMIC STABILITY: TRANSPORTATION INSECURITY
IN THE PAST 12 MONTHS, HAS LACK OF TRANSPORTATION KEPT YOU FROM MEETINGS, WORK, OR FROM GETTING THINGS NEEDED FOR DAILY LIVING?: PATIENT DECLINED

## 2023-08-03 NOTE — PROGRESS NOTES
Chief Complaint   Patient presents with    Benign Prostatic Hypertrophy    Cholesterol Problem    Hypertension    Other     Hx if IFG and bilateral hearing loss     Results     Review of lab results from 02/09/2023      1. \"Have you been to the ER, urgent care clinic since your last visit? Hospitalized since your last visit? \" No    2. \"Have you seen or consulted any other health care providers outside of the [address] [place] [address] since your last visit? \" No     3. For patients aged 43-73: Has the patient had a colonoscopy / FIT/ Cologuard? Yes - no Care Gap present due 04/04/2024      If the patient is female:    4. For patients aged 43-66: Has the patient had a mammogram within the past 2 years? NA - based on age or sex      11. For patients aged 21-65: Has the patient had a pap smear?  NA - based on age or sex

## 2023-08-04 ENCOUNTER — OFFICE VISIT (OUTPATIENT)
Age: 78
End: 2023-08-04
Payer: MEDICARE

## 2023-08-04 VITALS
RESPIRATION RATE: 14 BRPM | HEART RATE: 67 BPM | BODY MASS INDEX: 22.73 KG/M2 | OXYGEN SATURATION: 100 % | HEIGHT: 68 IN | SYSTOLIC BLOOD PRESSURE: 132 MMHG | TEMPERATURE: 97.8 F | DIASTOLIC BLOOD PRESSURE: 74 MMHG | WEIGHT: 150 LBS

## 2023-08-04 DIAGNOSIS — R73.01 IMPAIRED FASTING BLOOD SUGAR: ICD-10-CM

## 2023-08-04 DIAGNOSIS — N40.0 BENIGN PROSTATIC HYPERPLASIA WITHOUT LOWER URINARY TRACT SYMPTOMS: ICD-10-CM

## 2023-08-04 DIAGNOSIS — G89.29 CHRONIC PAIN OF LEFT KNEE: ICD-10-CM

## 2023-08-04 DIAGNOSIS — M25.562 CHRONIC PAIN OF LEFT KNEE: ICD-10-CM

## 2023-08-04 DIAGNOSIS — Z96.652 HISTORY OF LEFT KNEE REPLACEMENT: ICD-10-CM

## 2023-08-04 DIAGNOSIS — M25.50 MULTIPLE JOINT PAIN: Primary | ICD-10-CM

## 2023-08-04 DIAGNOSIS — E78.00 PURE HYPERCHOLESTEROLEMIA: ICD-10-CM

## 2023-08-04 DIAGNOSIS — I10 ESSENTIAL HYPERTENSION: ICD-10-CM

## 2023-08-04 DIAGNOSIS — R45.89 FEELING SAD: ICD-10-CM

## 2023-08-04 PROCEDURE — 99213 OFFICE O/P EST LOW 20 MIN: CPT | Performed by: FAMILY MEDICINE

## 2023-08-04 PROCEDURE — 1123F ACP DISCUSS/DSCN MKR DOCD: CPT | Performed by: FAMILY MEDICINE

## 2023-08-04 PROCEDURE — 3078F DIAST BP <80 MM HG: CPT | Performed by: FAMILY MEDICINE

## 2023-08-04 PROCEDURE — G8427 DOCREV CUR MEDS BY ELIG CLIN: HCPCS | Performed by: FAMILY MEDICINE

## 2023-08-04 PROCEDURE — 1036F TOBACCO NON-USER: CPT | Performed by: FAMILY MEDICINE

## 2023-08-04 PROCEDURE — 3075F SYST BP GE 130 - 139MM HG: CPT | Performed by: FAMILY MEDICINE

## 2023-08-04 PROCEDURE — 3017F COLORECTAL CA SCREEN DOC REV: CPT | Performed by: FAMILY MEDICINE

## 2023-08-04 PROCEDURE — G8420 CALC BMI NORM PARAMETERS: HCPCS | Performed by: FAMILY MEDICINE

## 2023-08-04 RX ORDER — MELOXICAM 15 MG/1
15 TABLET ORAL DAILY
COMMUNITY
Start: 2023-07-18

## 2023-08-04 ASSESSMENT — PATIENT HEALTH QUESTIONNAIRE - PHQ9
2. FEELING DOWN, DEPRESSED OR HOPELESS: 0
9. THOUGHTS THAT YOU WOULD BE BETTER OFF DEAD, OR OF HURTING YOURSELF: 0
SUM OF ALL RESPONSES TO PHQ QUESTIONS 1-9: 0
SUM OF ALL RESPONSES TO PHQ QUESTIONS 1-9: 0
8. MOVING OR SPEAKING SO SLOWLY THAT OTHER PEOPLE COULD HAVE NOTICED. OR THE OPPOSITE, BEING SO FIGETY OR RESTLESS THAT YOU HAVE BEEN MOVING AROUND A LOT MORE THAN USUAL: 0
SUM OF ALL RESPONSES TO PHQ9 QUESTIONS 1 & 2: 0
10. IF YOU CHECKED OFF ANY PROBLEMS, HOW DIFFICULT HAVE THESE PROBLEMS MADE IT FOR YOU TO DO YOUR WORK, TAKE CARE OF THINGS AT HOME, OR GET ALONG WITH OTHER PEOPLE: 0
5. POOR APPETITE OR OVEREATING: 0
6. FEELING BAD ABOUT YOURSELF - OR THAT YOU ARE A FAILURE OR HAVE LET YOURSELF OR YOUR FAMILY DOWN: 0
7. TROUBLE CONCENTRATING ON THINGS, SUCH AS READING THE NEWSPAPER OR WATCHING TELEVISION: 0
3. TROUBLE FALLING OR STAYING ASLEEP: 0
SUM OF ALL RESPONSES TO PHQ QUESTIONS 1-9: 0
1. LITTLE INTEREST OR PLEASURE IN DOING THINGS: 0
SUM OF ALL RESPONSES TO PHQ QUESTIONS 1-9: 0
4. FEELING TIRED OR HAVING LITTLE ENERGY: 0

## 2023-08-04 NOTE — PROGRESS NOTES
José Joseph is a 78 y.o. male complains of   Chief Complaint   Patient presents with    Benign Prostatic Hypertrophy    Cholesterol Problem    Hypertension    Other     Hx if IFG and bilateral hearing loss     Results     Review of lab results from 02/09/2023       HPI: Here for follow-up. Had a left knee replacement surgery done in January. Not happy with the result. Had a follow-up visit with surgeon. Was advised to do routine exercise and symptomatic treatment. Taking Mobic at this time. Please take with every flexion of the knee. Left knee exam changes walking. For now we will consider physical therapy again and he would like to do that. If no improvement I have advised him to call back and we can do referral to surgeon for second opinion. He agrees with it. Multiple site pain. Shoulder, back: Sometimes hip and knee. At this time symptomatic treatment. No joint swelling except left knee. Left knee effusion. Feeling sad with overall situation with the left knee. No depression. Very active with his routine. Left knee pain  Makes his routine different. We will follow-up after physical therapy. Hypertension, hyperlipidemia, prediabetes. Again working on diet modification and lifestyle modification. BPH. Currently fairly stable. PSA within normal limits. No concerns. Did not appear in any acute distress  Denies any headache, dizziness, no chest pain or trouble breathing, no arm or leg weakness. No nausea or vomiting, no weight or appetite changes, no mood changes . No urine or bowel complains, no palpitation, no diaphoresis. No abdominal pain. No cold or cough. No leg swelling. No fever. No sleep trouble.    CMP:  Lab Results   Component Value Date/Time     02/09/2023 07:45 AM    K 4.4 02/09/2023 07:45 AM     02/09/2023 07:45 AM    CO2 27 02/09/2023 07:45 AM    BUN 21 02/09/2023 07:45 AM    CREATININE 0.94 02/09/2023 07:45 AM    GLUCOSE 96 02/09/2023 07:45 AM

## 2023-08-14 ENCOUNTER — OFFICE VISIT (OUTPATIENT)
Age: 78
End: 2023-08-14

## 2023-08-14 VITALS
SYSTOLIC BLOOD PRESSURE: 140 MMHG | OXYGEN SATURATION: 96 % | HEART RATE: 67 BPM | DIASTOLIC BLOOD PRESSURE: 70 MMHG | BODY MASS INDEX: 23.57 KG/M2 | WEIGHT: 155 LBS

## 2023-08-14 DIAGNOSIS — I10 ESSENTIAL HYPERTENSION WITH GOAL BLOOD PRESSURE LESS THAN 140/90: Primary | ICD-10-CM

## 2023-08-14 DIAGNOSIS — E78.00 PURE HYPERCHOLESTEROLEMIA: ICD-10-CM

## 2023-08-14 NOTE — PROGRESS NOTES
[facility]    Daisy Hutton is 78 y.o. male of hypertension, hyperlipidemia and other medical problems      Patient is here today for follow-up appointment for his hypertension and hyperlipidemia. He denies any prior history of MI and CHF. He denies any significant chest pain or chest tightness since last visit. He denies any hospital admission or ER visit. Denies any significant palpitation. No significant shortness of breath. Denies any PND or lower extremity swelling. He is his only complaint is left knee pain which is limiting his activities. Denies any nausea, vomiting, abdominal pain, fever, chills, sputum production. No hematuria or other bleeding complaints    Past Medical History:   Diagnosis Date    Basal cell carcinoma     BPH (benign prostatic hyperplasia)     Depression     Gastrointestinal disorder     Diverticulitis    GERD (gastroesophageal reflux disease)     History of colon polyps 2/21/2019    HTN (hypertension)     Hypertension     Inguinal hernia     left side    Psychiatric disorder     Aggression, Depression       Review of Systems:  Cardiac symptoms as noted above in HPI. All others negative. Current Outpatient Medications   Medication Sig    meloxicam (MOBIC) 15 MG tablet Take 1 tablet by mouth daily    finasteride (PROSCAR) 5 MG tablet TAKE 1 TABLET DAILY    atorvastatin (LIPITOR) 20 MG tablet TAKE 1 TABLET NIGHTLY    alfuzosin (UROXATRAL) 10 MG extended release tablet TAKE 1 TABLET DAILY AFTER DINNER    omeprazole (PRILOSEC) 20 MG delayed release capsule TAKE 1 CAPSULE TWICE A DAY    lisinopril (PRINIVIL;ZESTRIL) 10 MG tablet TAKE 1 TABLET DAILY    acetaminophen (TYLENOL) 500 MG tablet Take by mouth every 6 hours as needed    ibuprofen (ADVIL;MOTRIN) 200 MG tablet Take 2 tablets by mouth every 6 hours as needed     No current facility-administered medications for this visit.        Past Surgical History:

## 2023-08-21 RX ORDER — LISINOPRIL 10 MG/1
TABLET ORAL
Qty: 90 TABLET | Refills: 1 | Status: SHIPPED | OUTPATIENT
Start: 2023-08-21

## 2023-08-25 ENCOUNTER — HOSPITAL ENCOUNTER (OUTPATIENT)
Facility: HOSPITAL | Age: 78
Setting detail: RECURRING SERIES
Discharge: HOME | End: 2023-08-28
Payer: MEDICARE

## 2023-08-25 PROCEDURE — 97110 THERAPEUTIC EXERCISES: CPT

## 2023-08-25 PROCEDURE — 97162 PT EVAL MOD COMPLEX 30 MIN: CPT

## 2023-08-25 PROCEDURE — 97535 SELF CARE MNGMENT TRAINING: CPT

## 2023-08-25 NOTE — PROGRESS NOTES
PHYSICAL / OCCUPATIONAL THERAPY - DAILY TREATMENT NOTE (updated )    Patient Name: Nidhi Oh    Date: 2023    : 1945  Insurance: Payor: MEDICARE / Plan: MEDICARE PART A AND B / Product Type: *No Product type* /      Patient  verified Yes     Visit #   Current / Total 1 24   Time   In / Out 1031 1108   Pain   In / Out 1 1   Subjective Functional Status/Changes: See eval   Changes to:  Meds, Allergies, Med Hx, Sx Hx? If yes, update Summary List no       TREATMENT AREA =  Left knee pain [M25.562]    OBJECTIVE    Therapeutic Procedures: Tx Min Billable or 1:1 Min (if diff from Tx Min) Procedure, Rationale, Specifics   15  29962 Therapeutic Exercise (timed):  increase ROM, strength, coordination, balance, and proprioception to improve patient's ability to progress to PLOF and address remaining functional goals. (see flow sheet as applicable)     Details if applicable:       8  78436 Self Care/Home Management (timed):  improve patient knowledge and understanding of diagnosis/prognosis and physical therapy expectations, procedures and progression  to improve patient's ability to progress to PLOF and address remaining functional goals.   (see flow sheet as applicable)     Details if applicable:            Details if applicable:            Details if applicable:            Details if applicable:     21  [facility] Totals Reminder: bill using total billable min of TIMED therapeutic procedures (example: do not include dry needle or estim unattended, both untimed codes, in totals to left)  8-22 min = 1 unit; 23-37 min = 2 units; 38-52 min = 3 units; 53-67 min = 4 units; 68-82 min = 5 units   Total Total     TOTAL TREATMENT TIME:        37     [x]  Patient Education billed concurrently with other procedures   [x] Review HEP    [] Progressed/Changed HEP, detail:    [] Other detail:       Objective Information/Functional Measures/Assessment    See POC    Patient will continue to benefit from skilled PT / OT
left hip ext and hamstrings strength to 5/5 to improve stability for gardening. 4. Patient will report >=50% improvement with pain to increase ease with walking a mile. Frequency / Duration: Patient to be seen 2 times per week for 12 weeks  Goals will be assigned and reassessed every 10 visits/ 30 days per guidelines . Patient/ Caregiver education and instruction: Diagnosis, prognosis, self care, exercises, and other [place]  [x]  Plan of care has been reviewed with PTA    Certification Period: 8/25/23-11/23/23    Santosh Small, PT       8/25/2023       56:82 AM    I certify that the above Therapy Services are being furnished while the patient is under my care. I agree with the treatment plan and certify that this therapy is necessary. Physician's Signature:_________________________   DATE:_________   TIME:________                           Leighton Padron MD  Insurance: Payor: MEDICARE / Plan: MEDICARE PART A AND B / Product Type: *No Product type* /      ** Signature, Date and Time must be completed for valid certification **  [facility] or you may fax the signed copy to [phone]. Thank you.

## 2023-08-28 ENCOUNTER — HOSPITAL ENCOUNTER (OUTPATIENT)
Facility: HOSPITAL | Age: 78
Setting detail: RECURRING SERIES
Discharge: HOME | End: 2023-08-31
Payer: MEDICARE

## 2023-08-28 PROCEDURE — 97110 THERAPEUTIC EXERCISES: CPT

## 2023-08-28 PROCEDURE — 97112 NEUROMUSCULAR REEDUCATION: CPT

## 2023-08-28 PROCEDURE — 97140 MANUAL THERAPY 1/> REGIONS: CPT

## 2023-08-28 NOTE — PROGRESS NOTES
hamstrings strength to 5/5 to improve stability for gardening. IE:hip ext 4-/5; hamstrings 4-/5  4. Patient will report >=50% improvement with pain to increase ease with walking a mile.   IE:0%    PLAN  Yes  Continue plan of care  []  Upgrade activities as tolerated  []  Discharge due to :  []  Other:    Blankae Aaronerel, PTA    8/28/2023    1:00 PM    Future Appointments   Date Time Provider [address]   8/28/2023  1:10 PM Karalee Pickerel, PTA [facility]   9/11/2023 11:50 AM Bibi Karon, PT [facility]   9/13/2023 12:30 PM Bibi Karon, PT [facility]   9/18/2023 11:10 AM Bibi Karon, PT [facility]   9/20/2023 11:10 AM Bibi Karon, PT [facility]   9/25/2023 11:50 AM Karalee Pickerel, PTA [facility]   9/27/2023 11:50 AM Karalee Pickerel, PTA [facility]   10/2/2023 11:50 AM Bibi Karon, PT [facility]   10/4/2023 11:10 AM Bibi Karon, PT [facility]   2/5/2024  8:30 AM Racheal Mercado MD [facility] AMB   8/19/2024  8:00 AM Aguilar Florian MD [facility] BS AMB

## 2023-09-11 ENCOUNTER — HOSPITAL ENCOUNTER (OUTPATIENT)
Facility: HOSPITAL | Age: 78
Setting detail: RECURRING SERIES
Discharge: HOME | End: 2023-09-14
Payer: MEDICARE

## 2023-09-11 PROCEDURE — 97140 MANUAL THERAPY 1/> REGIONS: CPT

## 2023-09-11 PROCEDURE — 97110 THERAPEUTIC EXERCISES: CPT

## 2023-09-11 PROCEDURE — 97112 NEUROMUSCULAR REEDUCATION: CPT

## 2023-09-11 NOTE — PROGRESS NOTES
PHYSICAL / OCCUPATIONAL THERAPY - DAILY TREATMENT NOTE (updated )    Patient Name: Juan Naranjo    Date: 2023    : 1945  Insurance: Payor: MEDICARE / Plan: MEDICARE PART A AND B / Product Type: *No Product type* /      Patient  verified Yes     Visit #   Current / Total 3 24   Time   In / Out 11:53 12:36   Pain   In / Out 3/10 4/10   Subjective Functional Status/Changes: The patient reports that his knee was sore after walking this morning, she he already iced it. Changes to: Allergies, Med Hx, Sx Hx?   no       TREATMENT AREA =  Left knee pain [M25.562]    OBJECTIVE  Therapeutic Procedures: Tx Min Billable or 1:1 Min (if diff from Tx Min) Procedure, Rationale, Specifics   23  46243 Therapeutic Exercise (timed):  increase ROM, strength, coordination, balance, and proprioception to improve patient's ability to progress to PLOF and address remaining functional goals. (see flow sheet as applicable)    Details if applicable:       8  81492 Neuromuscular Re-Education (timed):  improve balance, coordination, kinesthetic sense, posture, core stability and proprioception to improve patient's ability to develop conscious control of individual muscles and awareness of position of extremities in order to progress to PLOF and address remaining functional goals. (see flow sheet as applicable)    Details if applicable:       12  00565 Manual Therapy (timed):  decrease pain, increase ROM, and increase tissue extensibility to improve patient's ability to progress to PLOF and address remaining functional goals. The manual therapy interventions were performed at a separate and distinct time from the therapeutic activities interventions . Details: improve ADL ease. Details if applicable:  PROM left tibiofemoral mobs grade III performed pt supine into flexion, graston gently of left ITB pt supine.      37  [address] Reminder: bill using total billable min of TIMED therapeutic procedures (example: do not

## 2023-09-13 ENCOUNTER — HOSPITAL ENCOUNTER (OUTPATIENT)
Facility: HOSPITAL | Age: 78
Setting detail: RECURRING SERIES
Discharge: HOME | End: 2023-09-16
Payer: MEDICARE

## 2023-09-13 PROCEDURE — 97140 MANUAL THERAPY 1/> REGIONS: CPT

## 2023-09-13 PROCEDURE — 97110 THERAPEUTIC EXERCISES: CPT

## 2023-09-13 PROCEDURE — 97112 NEUROMUSCULAR REEDUCATION: CPT

## 2023-09-13 NOTE — PROGRESS NOTES
PHYSICAL / OCCUPATIONAL THERAPY - DAILY TREATMENT NOTE (updated )    Patient Name: Meño Odom    Date: 2023    : 1945  Insurance: Payor: MEDICARE / Plan: MEDICARE PART A AND B / Product Type: *No Product type* /      Patient  verified Yes     Visit #   Current / Total 4 24   Time   In / Out 12:31 1:12   Pain   In / Out 4/10 1-2/10   Subjective Functional Status/Changes: The patient reports his knee is sore today. Changes to: Allergies, Med Hx, Sx Hx?   no       TREATMENT AREA =  Left knee pain [M25.562]    OBJECTIVE  Therapeutic Procedures: Tx Min Billable or 1:1 Min (if diff from Tx Min) Procedure, Rationale, Specifics   21  50890 Therapeutic Exercise (timed):  increase ROM, strength, coordination, balance, and proprioception to improve patient's ability to progress to PLOF and address remaining functional goals. (see flow sheet as applicable)    Details if applicable:       12  58550 Neuromuscular Re-Education (timed):  improve balance, coordination, kinesthetic sense, posture, core stability and proprioception to improve patient's ability to develop conscious control of individual muscles and awareness of position of extremities in order to progress to PLOF and address remaining functional goals. (see flow sheet as applicable)    Details if applicable:     8  15583 Manual Therapy (timed):  decrease pain, increase ROM, and increase tissue extensibility to improve patient's ability to progress to PLOF and address remaining functional goals. The manual therapy interventions were performed at a separate and distinct time from the therapeutic activities interventions . Details: PROM left tibiofemoral mobs grade III performed pt supine into flexion, graston gently of left ITB pt supine.     GT5  Details if applicable:     39  [facility] Totals Reminder: bill using total billable min of TIMED therapeutic procedures (example: do not include dry needle or estim unattended, both untimed codes, in totals

## 2023-09-14 RX ORDER — OMEPRAZOLE 20 MG/1
CAPSULE, DELAYED RELEASE ORAL
Qty: 180 CAPSULE | Refills: 1 | Status: SHIPPED | OUTPATIENT
Start: 2023-09-14

## 2023-09-18 ENCOUNTER — HOSPITAL ENCOUNTER (OUTPATIENT)
Facility: HOSPITAL | Age: 78
Setting detail: RECURRING SERIES
Discharge: HOME | End: 2023-09-21
Payer: MEDICARE

## 2023-09-18 PROCEDURE — 97112 NEUROMUSCULAR REEDUCATION: CPT

## 2023-09-18 PROCEDURE — 97110 THERAPEUTIC EXERCISES: CPT

## 2023-09-18 PROCEDURE — 97140 MANUAL THERAPY 1/> REGIONS: CPT

## 2023-09-18 NOTE — PROGRESS NOTES
applicable:     44   [facility] Totals Reminder: bill using total billable min of TIMED therapeutic procedures (example: do not include dry needle or estim unattended, both untimed codes, in totals to left)  8-22 min = 1 unit; 23-37 min = 2 units; 38-52 min = 3 units; 53-67 min = 4 units; 68-82 min = 5 units   Total Total       TOTAL TREATMENT TIME:    39          [x]  Patient Education billed concurrently with other procedures   [x] Review HEP    [] Progressed/Changed HEP, detail:    [] Other detail:       Objective Information/Functional Measures/Assessment    Left knee flexion 115 degrees passively. No significant change regarding passive ROM improvement. He demonstrates continued pain of his knee especially with flexion, though demonstrates less point tenderness about his ITB and vastus lateral to palpation. Patient will continue to benefit from skilled PT / OT services to modify and progress therapeutic interventions, analyze and address functional mobility deficits, analyze and address ROM deficits, analyze and address strength deficits, analyze and address soft tissue restrictions, analyze and cue for proper movement patterns, analyze and modify for postural abnormalities, and instruct in home and community integration to address functional deficits and attain remaining goals. Progress toward goals / Updated goals:  []  See Progress Note/Recertification  Short Term Goals: To be accomplished in 2 weeks  1. I and compliant with HEP for self management of symptoms. IE: issued HEP  Current: Met, pt reports compliance. 8/28/2023  2. Increase left knee PROM flexion to 120 deg to increase ease negotiating stairs. IE: 116 deg  Current: No significant change since  degrees 9/18/2023  Long Term Goals: To be accomplished in 12 weeks  1. Improve FOTO to 70 to indicate improved function with daily activities. IE: 64  2.  Increase left knee AROM flexion to 120 deg to enable patient to descend stairs

## 2023-09-20 ENCOUNTER — HOSPITAL ENCOUNTER (OUTPATIENT)
Facility: HOSPITAL | Age: 78
Setting detail: RECURRING SERIES
Discharge: HOME | End: 2023-09-23
Payer: MEDICARE

## 2023-09-20 PROCEDURE — 97110 THERAPEUTIC EXERCISES: CPT

## 2023-09-20 PROCEDURE — 97112 NEUROMUSCULAR REEDUCATION: CPT

## 2023-09-20 PROCEDURE — 97140 MANUAL THERAPY 1/> REGIONS: CPT

## 2023-09-20 NOTE — PROGRESS NOTES
PHYSICAL / OCCUPATIONAL THERAPY - DAILY TREATMENT NOTE (updated )    Patient Name: Lucila Lewis    Date: 2023    : 1945  Insurance: Payor: MEDICARE / Plan: MEDICARE PART A AND B / Product Type: *No Product type* /      Patient  verified Yes     Visit #   Current / Total 6 24   Time   In / Out 11:12 11:50   Pain   In / Out 4/10 3/10   Subjective Functional Status/Changes: \"Knee was sore yesterday. Kimball Severs went after my knee last visit. \"   Changes to: Allergies, Med Hx, Sx Hx?   no       TREATMENT AREA =  Left knee pain [M25.562]    OBJECTIVE    Therapeutic Procedures: Tx Min Billable or 1:1 Min (if diff from Tx Min) Procedure, Rationale, Specifics   16  62579 Therapeutic Exercise (timed):  increase ROM, strength, coordination, balance, and proprioception to improve patient's ability to progress to PLOF and address remaining functional goals. (see flow sheet as applicable)    Details if applicable:       10  79274 Neuromuscular Re-Education (timed):  improve balance, coordination, kinesthetic sense, posture, core stability and proprioception to improve patient's ability to develop conscious control of individual muscles and awareness of position of extremities in order to progress to PLOF and address remaining functional goals. (see flow sheet as applicable)    Details if applicable:  Eccentric step downs, band-walks 2-way. 12  14178 Manual Therapy (timed):  decrease pain, increase ROM, increase tissue extensibility, and decrease trigger points to improve patient's ability to progress to PLOF and address remaining functional goals. The manual therapy interventions were performed at a separate and distinct time from the therapeutic activities interventions . Details:        Details if applicable:  STM/DTT distal quads, ITB and adductors. Patellar mobs, fem-tib flex/ext mobs grade III. PROM end range flex. Pt supine.    45  [address] Totals Reminder: bill using total billable min of TIMED therapeutic

## 2023-09-25 ENCOUNTER — HOSPITAL ENCOUNTER (OUTPATIENT)
Facility: HOSPITAL | Age: 78
Setting detail: RECURRING SERIES
Discharge: HOME | End: 2023-09-28
Payer: MEDICARE

## 2023-09-25 PROCEDURE — 97112 NEUROMUSCULAR REEDUCATION: CPT

## 2023-09-25 PROCEDURE — 97110 THERAPEUTIC EXERCISES: CPT

## 2023-09-25 PROCEDURE — 97140 MANUAL THERAPY 1/> REGIONS: CPT

## 2023-09-25 NOTE — PROGRESS NOTES
[facility]  [address] [address] [place] [address] [address]  [facility]  [phone] fax    Physical Therapy Discharge Summary  Patient name: Juan Naranjo Start of Care: 2023   Referral source: Chelsie Barfield MD : 1945   Medical/Treatment Diagnosis: Left knee pain [M25.562]  Payor: MEDICARE / Plan: MEDICARE PART A AND B / Product Type: *No Product type* /  Onset Date:23 DOS     Prior Hospitalization: see medical history Provider#: [id number]   Medications: Verified on Patient Summary List    Comorbidities:  OA; HTN; enlarged prostate; GERD  Prior Level of Function:able to negotiate stairs; no pain with daily activities; gardens    Visits from Start of Care: 7    Missed Visits: 0    Reporting Period : 2023 to 2023    Goals/Measure of Progress:  Short Term Goals: To be accomplished in 2 weeks  1. I and compliant with HEP for self management of symptoms. IE: issued HEP  Current: Met, pt reports compliance. 2. Increase left knee PROM flexion to 120 deg to increase ease negotiating stairs. IE: 116 deg  Current: Regressed to 105 degrees. Long Term Goals: To be accomplished in 12 weeks  1. Improve FOTO to 70 to indicate improved function with daily activities. IE: 56  Current: 57%. 2. Increase left knee AROM flexion to 120 deg to enable patient to descend stairs reciprocally. IE: 112 deg  Current: Regressed to 108 degrees. 3. Increase left hip ext and hamstrings strength to 5/5 to improve stability for gardening. IE: hip ext 4-/5; hamstrings 4-/5  Current: MMT Left HS 4+/5, Left hip ext 4/5.  4. Patient will report >=50% improvement with pain to increase ease with walking a mile. IE: 0%  Current: Pt reports pain has increased since [facility]. Assessment/ Summary of Care:   FOTO 57%. Pt reports pain has increased since [facility]. Left knee AROM/PROM 105/108 degrees.  ROM regressed today, pt reports being on his feet a lot yesterday which has increased
9/25/2023  2. Increase left knee AROM flexion to 120 deg to enable patient to descend stairs reciprocally. IE: 112 deg  Current: 115 degrees passively 9/18/2023; Regressed to 108 degrees. 9/25/2023  3. Increase left hip ext and hamstrings strength to 5/5 to improve stability for gardening. IE: hip ext 4-/5; hamstrings 4-/5  Current: MMT Left HS 4+/5, Left hip ext 4/5. 9/20/2023  4. Patient will report >=50% improvement with pain to increase ease with walking a mile. IE: 0%  Current: Pt reports pain has increased since [facility]. 9/25/2023    PLAN  No  Continue plan of care  []  Upgrade activities as tolerated  [x]  Discharge due to: Limited progress towards set goals, no significant change in knee mobility.   []  Other:    Teresa Baugh PTA    9/25/2023    11:51 AM    Future Appointments   Date Time Provider [address]   9/27/2023 11:50 AM Teresa Baugh PTA [facility]   10/2/2023 11:50 AM Tiago Goncalves PT [facility]   10/4/2023 11:10 AM Tiago Goncalves PT [facility]   2/5/2024  8:30 AM Hugh Guaman MD \[facility]"" BS AMB   8/19/2024  8:00 AM Aguilar Davila MD [facility] BS AMB

## 2023-09-26 RX ORDER — ALFUZOSIN HYDROCHLORIDE 10 MG/1
TABLET, EXTENDED RELEASE ORAL
Qty: 90 TABLET | Refills: 1 | Status: SHIPPED | OUTPATIENT
Start: 2023-09-26

## 2023-09-27 ENCOUNTER — APPOINTMENT (OUTPATIENT)
Facility: HOSPITAL | Age: 78
End: 2023-09-27
Payer: MEDICARE

## 2023-10-05 RX ORDER — ATORVASTATIN CALCIUM 20 MG/1
TABLET, FILM COATED ORAL
Qty: 90 TABLET | Refills: 1 | Status: SHIPPED | OUTPATIENT
Start: 2023-10-05

## 2023-11-09 ENCOUNTER — TELEPHONE (OUTPATIENT)
Age: 78
End: 2023-11-09

## 2023-11-09 NOTE — TELEPHONE ENCOUNTER
----- Message from Chepe Ramírez sent at 11/9/2023  4:22 PM EST -----  Regarding: Ortho Specialist Recommendation  Contact: [phone]  Dr Ballard,  Who would be your recommendation/s for me to get a second opinion regarding my ten-month postop knee?  Thank you,  Very respectfully,  Chepe

## 2023-11-14 DIAGNOSIS — Z96.652 STATUS POST LEFT KNEE REPLACEMENT: ICD-10-CM

## 2023-11-14 DIAGNOSIS — M25.562 CHRONIC PAIN OF LEFT KNEE: Primary | ICD-10-CM

## 2023-11-14 DIAGNOSIS — G89.29 CHRONIC PAIN OF LEFT KNEE: Primary | ICD-10-CM

## 2023-11-28 SDOH — HEALTH STABILITY: PHYSICAL HEALTH: ON AVERAGE, HOW MANY DAYS PER WEEK DO YOU ENGAGE IN MODERATE TO STRENUOUS EXERCISE (LIKE A BRISK WALK)?: 0 DAYS

## 2023-11-30 NOTE — PROGRESS NOTES
Patient: Caren Melo                MRN: [id number]       SSN: [id number]  YOB: 1945        AGE: 70 y.o. SEX: male      PCP: Rigoberto Badillo MD  12/01/23    Chief Complaint   Patient presents with    Knee Pain     LEFT     HISTORY:  Caren Melo is a 70 y.o. male referred by Dr. Beti Guillaume for post op left knee pain and stiffness. He has been experiencing clicking, popping, stiffness and pain since his Esdras robotic knee replacement surgery with Dr. Autumn Hurtado 1/24/23. He states that he returned to inquire about his persistent postop symptoms but Dr. Autumn Hurtado just said there was nothing wrong. He is very frustrated by his lack of progress. 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 He denies any recent injury. He feels pain with standing, walking and stair climbing. He experiences startup pain after sitting. He has increased pain, swelling, and stiffness. PCP Dr. Beti Guillaume referred him to PT in September but it just aggravated his pain. He has pain when bending his knee. He states that his range of motion is actually regressing. He experiences numbness down the lateral side of his leg. His pain is mostly lateral. He does not feel like he is a fall risk. He denies any fevers or chills. He is also experiencing pain in his right second toe. He injured his toe when he caught it on a shower seat his bathroom a few days ago. Occupation, etc: Mr. Herminia Diego  is retired. 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 He is also retired [employer]. He lives with his wife in [place] [facility]. He is the main care provider for his wife who has fallen 25 times this year. He has 1 adult daughter and 4 grandchildren. He sees his daughter and grandchildren every Saturday.    Wt Readings from Last 3 Encounters:   12/01/23 70.3 kg (155 lb)   08/14/23 70.3 kg (155 lb)   08/04/23 68 kg (150 lb)      Body mass index is 23.57

## 2023-12-01 ENCOUNTER — OFFICE VISIT (OUTPATIENT)
Age: 78
End: 2023-12-01

## 2023-12-01 VITALS — BODY MASS INDEX: 23.49 KG/M2 | HEIGHT: 68 IN | WEIGHT: 155 LBS | TEMPERATURE: 97.2 F

## 2023-12-01 DIAGNOSIS — M79.671 FOOT PAIN, RIGHT: ICD-10-CM

## 2023-12-01 DIAGNOSIS — T84.84XA PAIN DUE TO TOTAL LEFT KNEE REPLACEMENT, INITIAL ENCOUNTER (HCC): ICD-10-CM

## 2023-12-01 DIAGNOSIS — M25.562 PAIN AND SWELLING OF LEFT KNEE: Primary | ICD-10-CM

## 2023-12-01 DIAGNOSIS — S90.121A CONTUSION OF SECOND TOE OF RIGHT FOOT, INITIAL ENCOUNTER: ICD-10-CM

## 2023-12-01 DIAGNOSIS — M25.462 PAIN AND SWELLING OF LEFT KNEE: Primary | ICD-10-CM

## 2023-12-01 DIAGNOSIS — Z96.652 PAIN DUE TO TOTAL LEFT KNEE REPLACEMENT, INITIAL ENCOUNTER (HCC): ICD-10-CM

## 2023-12-01 RX ORDER — LATANOPROST 50 UG/ML
SOLUTION/ DROPS OPHTHALMIC
COMMUNITY
Start: 2023-11-13

## 2023-12-05 ENCOUNTER — HOSPITAL ENCOUNTER (OUTPATIENT)
Facility: HOSPITAL | Age: 78
Discharge: HOME | End: 2023-12-08
Payer: MEDICARE

## 2023-12-05 LAB
CRP SERPL HS-MCNC: 0.3 MG/L
ERYTHROCYTE [SEDIMENTATION RATE] IN BLOOD: 9 MM/HR (ref 0–20)

## 2023-12-05 PROCEDURE — 83529 ASAY OF INTERLEUKIN-6 (IL-6): CPT

## 2023-12-05 PROCEDURE — 36415 COLL VENOUS BLD VENIPUNCTURE: CPT

## 2023-12-05 PROCEDURE — 85652 RBC SED RATE AUTOMATED: CPT

## 2023-12-05 PROCEDURE — 86141 C-REACTIVE PROTEIN HS: CPT

## 2023-12-08 LAB — IL6 SERPL-MCNC: 2.6 PG/ML (ref 0–13)

## 2024-01-25 RX ORDER — FINASTERIDE 5 MG/1
TABLET, FILM COATED ORAL
Qty: 90 TABLET | Refills: 1 | Status: SHIPPED | OUTPATIENT
Start: 2024-01-25

## 2024-01-30 ENCOUNTER — HOSPITAL ENCOUNTER (OUTPATIENT)
Facility: HOSPITAL | Age: 79
Discharge: HOME | End: 2024-02-02
Payer: MEDICARE

## 2024-01-30 DIAGNOSIS — I10 ESSENTIAL HYPERTENSION: ICD-10-CM

## 2024-01-30 DIAGNOSIS — E78.00 PURE HYPERCHOLESTEROLEMIA: ICD-10-CM

## 2024-01-30 DIAGNOSIS — N40.0 BENIGN PROSTATIC HYPERPLASIA WITHOUT LOWER URINARY TRACT SYMPTOMS: ICD-10-CM

## 2024-01-30 DIAGNOSIS — R73.01 IMPAIRED FASTING BLOOD SUGAR: ICD-10-CM

## 2024-01-30 LAB
ALBUMIN SERPL-MCNC: 3.7 G/DL (ref 3.4–5)
ALBUMIN/GLOB SERPL: 1.3 (ref 0.8–1.7)
ALP SERPL-CCNC: 74 U/L (ref 45–117)
ALT SERPL-CCNC: 18 U/L (ref 16–61)
ANION GAP SERPL CALC-SCNC: 5 MMOL/L (ref 3–18)
AST SERPL-CCNC: 12 U/L (ref 10–38)
BILIRUB SERPL-MCNC: 0.7 MG/DL (ref 0.2–1)
BUN SERPL-MCNC: 20 MG/DL (ref 7–18)
BUN/CREAT SERPL: 17 (ref 12–20)
CALCIUM SERPL-MCNC: 8.9 MG/DL (ref 8.5–10.1)
CHLORIDE SERPL-SCNC: 110 MMOL/L (ref 100–111)
CHOLEST SERPL-MCNC: 152 MG/DL
CO2 SERPL-SCNC: 25 MMOL/L (ref 21–32)
CREAT SERPL-MCNC: 1.15 MG/DL (ref 0.6–1.3)
ERYTHROCYTE [DISTWIDTH] IN BLOOD BY AUTOMATED COUNT: 13.2 % (ref 11.6–14.5)
EST. AVERAGE GLUCOSE BLD GHB EST-MCNC: 117 MG/DL
GLOBULIN SER CALC-MCNC: 2.9 G/DL (ref 2–4)
GLUCOSE SERPL-MCNC: 97 MG/DL (ref 74–99)
HBA1C MFR BLD: 5.7 % (ref 4.2–5.6)
HCT VFR BLD AUTO: 43.5 % (ref 36–48)
HDLC SERPL-MCNC: 94 MG/DL (ref 40–60)
HDLC SERPL: 1.6 (ref 0–5)
HGB BLD-MCNC: 14.1 G/DL (ref 13–16)
LDLC SERPL CALC-MCNC: 43 MG/DL (ref 0–100)
LIPID PANEL: ABNORMAL
MCH RBC QN AUTO: 31.5 PG (ref 24–34)
MCHC RBC AUTO-ENTMCNC: 32.4 G/DL (ref 31–37)
MCV RBC AUTO: 97.1 FL (ref 78–100)
NRBC # BLD: 0 K/UL (ref 0–0.01)
NRBC BLD-RTO: 0 PER 100 WBC
PLATELET # BLD AUTO: 225 K/UL (ref 135–420)
PMV BLD AUTO: 11.5 FL (ref 9.2–11.8)
POTASSIUM SERPL-SCNC: 4.2 MMOL/L (ref 3.5–5.5)
PROT SERPL-MCNC: 6.6 G/DL (ref 6.4–8.2)
RBC # BLD AUTO: 4.48 M/UL (ref 4.35–5.65)
SODIUM SERPL-SCNC: 140 MMOL/L (ref 136–145)
TRIGL SERPL-MCNC: 75 MG/DL
VLDLC SERPL CALC-MCNC: 15 MG/DL
WBC # BLD AUTO: 5.9 K/UL (ref 4.6–13.2)

## 2024-01-30 PROCEDURE — 36415 COLL VENOUS BLD VENIPUNCTURE: CPT

## 2024-01-30 PROCEDURE — 80053 COMPREHEN METABOLIC PANEL: CPT

## 2024-01-30 PROCEDURE — 85027 COMPLETE CBC AUTOMATED: CPT

## 2024-01-30 PROCEDURE — 80061 LIPID PANEL: CPT

## 2024-01-30 PROCEDURE — 83036 HEMOGLOBIN GLYCOSYLATED A1C: CPT

## 2024-02-03 SDOH — HEALTH STABILITY: PHYSICAL HEALTH: ON AVERAGE, HOW MANY DAYS PER WEEK DO YOU ENGAGE IN MODERATE TO STRENUOUS EXERCISE (LIKE A BRISK WALK)?: 1 DAY

## 2024-02-03 SDOH — HEALTH STABILITY: PHYSICAL HEALTH: ON AVERAGE, HOW MANY MINUTES DO YOU ENGAGE IN EXERCISE AT THIS LEVEL?: 10 MIN

## 2024-02-03 ASSESSMENT — LIFESTYLE VARIABLES
HOW MANY STANDARD DRINKS CONTAINING ALCOHOL DO YOU HAVE ON A TYPICAL DAY: 1 OR 2
HOW OFTEN DO YOU HAVE SIX OR MORE DRINKS ON ONE OCCASION: 1
HOW OFTEN DO YOU HAVE A DRINK CONTAINING ALCOHOL: 4
HOW OFTEN DO YOU HAVE A DRINK CONTAINING ALCOHOL: 2-3 TIMES A WEEK
HOW MANY STANDARD DRINKS CONTAINING ALCOHOL DO YOU HAVE ON A TYPICAL DAY: 1

## 2024-02-03 ASSESSMENT — PATIENT HEALTH QUESTIONNAIRE - PHQ9
SUM OF ALL RESPONSES TO PHQ QUESTIONS 1-9: 2
SUM OF ALL RESPONSES TO PHQ9 QUESTIONS 1 & 2: 2
1. LITTLE INTEREST OR PLEASURE IN DOING THINGS: 1
SUM OF ALL RESPONSES TO PHQ QUESTIONS 1-9: 2
2. FEELING DOWN, DEPRESSED OR HOPELESS: 1
SUM OF ALL RESPONSES TO PHQ QUESTIONS 1-9: 2
SUM OF ALL RESPONSES TO PHQ QUESTIONS 1-9: 2

## 2024-02-05 ENCOUNTER — OFFICE VISIT (OUTPATIENT)
Age: 79
End: 2024-02-05
Payer: MEDICARE

## 2024-02-05 VITALS
TEMPERATURE: 97.1 F | RESPIRATION RATE: 16 BRPM | HEART RATE: 67 BPM | SYSTOLIC BLOOD PRESSURE: 128 MMHG | WEIGHT: 156.6 LBS | BODY MASS INDEX: 23.73 KG/M2 | OXYGEN SATURATION: 99 % | HEIGHT: 68 IN | DIASTOLIC BLOOD PRESSURE: 80 MMHG

## 2024-02-05 DIAGNOSIS — Z71.89 ACP (ADVANCE CARE PLANNING): ICD-10-CM

## 2024-02-05 DIAGNOSIS — R73.01 IMPAIRED FASTING BLOOD SUGAR: ICD-10-CM

## 2024-02-05 DIAGNOSIS — I10 ESSENTIAL HYPERTENSION: ICD-10-CM

## 2024-02-05 DIAGNOSIS — E78.5 HYPERLIPIDEMIA, UNSPECIFIED HYPERLIPIDEMIA TYPE: ICD-10-CM

## 2024-02-05 DIAGNOSIS — Z00.00 MEDICARE ANNUAL WELLNESS VISIT, SUBSEQUENT: Primary | ICD-10-CM

## 2024-02-05 DIAGNOSIS — H91.93 HEARING PROBLEM OF BOTH EARS: ICD-10-CM

## 2024-02-05 DIAGNOSIS — Z96.652 STATUS POST LEFT KNEE REPLACEMENT: ICD-10-CM

## 2024-02-05 PROCEDURE — G8484 FLU IMMUNIZE NO ADMIN: HCPCS | Performed by: FAMILY MEDICINE

## 2024-02-05 PROCEDURE — 99497 ADVNCD CARE PLAN 30 MIN: CPT | Performed by: FAMILY MEDICINE

## 2024-02-05 PROCEDURE — 3074F SYST BP LT 130 MM HG: CPT | Performed by: FAMILY MEDICINE

## 2024-02-05 PROCEDURE — 1123F ACP DISCUSS/DSCN MKR DOCD: CPT | Performed by: FAMILY MEDICINE

## 2024-02-05 PROCEDURE — 1036F TOBACCO NON-USER: CPT | Performed by: FAMILY MEDICINE

## 2024-02-05 PROCEDURE — G0439 PPPS, SUBSEQ VISIT: HCPCS | Performed by: FAMILY MEDICINE

## 2024-02-05 PROCEDURE — G8428 CUR MEDS NOT DOCUMENT: HCPCS | Performed by: FAMILY MEDICINE

## 2024-02-05 PROCEDURE — 3079F DIAST BP 80-89 MM HG: CPT | Performed by: FAMILY MEDICINE

## 2024-02-05 PROCEDURE — 99213 OFFICE O/P EST LOW 20 MIN: CPT | Performed by: FAMILY MEDICINE

## 2024-02-05 PROCEDURE — G8420 CALC BMI NORM PARAMETERS: HCPCS | Performed by: FAMILY MEDICINE

## 2024-02-05 NOTE — PROGRESS NOTES
Chepe Ramírez is a 78 y.o. male complains of   Chief Complaint   Patient presents with    Medicare AWV    Cholesterol Problem    Hypertension    Blood Sugar Problem       HPI: Here for follow-up and Medicare wellness.  Reviewed recent labs.  Prediabetes.  Discussed importance of diet and lifestyle modification.  Low-carb and high-protein diet.  Hypertension.  Vitals been stable.  Taking medication with compliance and no side effects.  On Proscar.  Tolerating it well.  No concern.  History of BPH.  Asymptomatic at this time.  Going for yearly dental and eye exam.  Having pain over the left knee since her knee replacement 16 weeks out.  Had follow-up with Ortho.  Alignment is good per patient.  At this time discussed Voltaren gel and ice application.  No swelling or redness noted.  Limitation in bending knee.  Recent lipid panel fairly stable.  Following ENT for hearing trouble.  Wearing hearing aid.  Had a hearing test yesterday.  Advised to follow ENT recommendation.  Denies any headache, dizziness, no chest pain or trouble breathing, no arm or leg weakness. No nausea or vomiting, no weight or appetite changes, no mood changes . No urine or bowel complains, no palpitation, no diaphoresis. No abdominal pain. No cold or cough. No leg swelling. No fever. No sleep trouble.   CMP:  Lab Results   Component Value Date/Time     01/30/2024 07:30 AM    K 4.2 01/30/2024 07:30 AM     01/30/2024 07:30 AM    CO2 25 01/30/2024 07:30 AM    BUN 20 01/30/2024 07:30 AM    CREATININE 1.15 01/30/2024 07:30 AM    GLUCOSE 97 01/30/2024 07:30 AM    CALCIUM 8.9 01/30/2024 07:30 AM    PROT 6.6 01/30/2024 07:30 AM    LABALBU 3.7 01/30/2024 07:30 AM    BILITOT 0.7 01/30/2024 07:30 AM    AST 12 01/30/2024 07:30 AM    ALT 18 01/30/2024 07:30 AM        POC Glucose: No results found for: \"POCGLU\"     CBC:  Lab Results   Component Value Date/Time    WBC 5.9 01/30/2024 07:30 AM    RBC 4.48 01/30/2024 07:30 AM    HGB 14.1 01/30/2024

## 2024-02-05 NOTE — PROGRESS NOTES
1. \"Have you been to the ER, urgent care clinic since your last visit?  Hospitalized since your last visit?\" No    2. \"Have you seen or consulted any other health care providers outside of the [facility] since your last visit?\" Yes When: [facility]: 10/09/23      3. For patients aged 45-75: Has the patient had a colonoscopy / FIT/ Cologuard? No - discontinued

## 2024-02-05 NOTE — ACP (ADVANCE CARE PLANNING)
Advance Care Planning     General Advance Care Planning (ACP) Conversation    Date of Conversation: 2/5/2024    Conducted with: Patient with Decision Making Capacity    Healthcare Decision Maker:    Primary Decision Maker: Eva Ramírez - [phone]  Click here to complete Healthcare Decision Makers including selection of the Healthcare Decision Maker Relationship (ie \"Primary\").   Today we  discussed advance directive. Pt wants DNR / DNI . Paper signed today and scanned in the chart     Content/Action Overview:  See above   Reviewed DNR/DNI and patient elects DNR order - completed portable DNR form & placed order  treatment goals, benefit/burden of treatment options, artificial nutrition, ventilation preferences, hospitalization preferences, resuscitation preferences, end of life care preferences (vegetative state/imminent death), and hospice care    Length of Voluntary ACP Conversation in minutes:  16 minutes    Justina Ballard MD

## 2024-02-05 NOTE — PATIENT INSTRUCTIONS
life support methods that might be used? If not, talk to your doctor so you know what might be done if you can't breathe on your own, your heart stops, or you can't swallow.  What things would you still want to be able to do after you receive life-support methods? Would you want to be able to walk? To speak? To eat on your own? To live without the help of machines?  Do you want certain Anabaptism practices performed if you become very ill?  If you have a choice, where do you want to be cared for? In your home? At a hospital or nursing home?  If you have a choice at the end of your life, where would you prefer to die? At home? In a hospital or nursing home? Somewhere else?  Would you prefer to be buried or cremated?  Do you want your organs to be donated after you die?  What should you do with your living will?  Make sure that your family members and your health care agent have copies of your living will (also called a declaration).  Give your doctor a copy of your living will. Ask to have it kept as part of your medical record. If you have more than one doctor, make sure that each one has a copy.  Put a copy of your living will where it can be easily found. For example, some people may put a copy on their refrigerator door. If you are using a digital copy, be sure your doctor, family members, and health care agent know how to find and access it.  Where can you learn more?  Go to [email or web address] and enter K356 to learn more about \"Learning About Living Amanda.\"  Current as of: March 26, 2023               Content Version: 13.9  © 8116-7716 [employer].   Care instructions adapted under license by [employer]. If you have questions about a medical condition or this instruction, always ask your healthcare professional. [employer] disclaims any warranty or liability for your use of this information.           Learning About Medical Power of   What is a medical power

## 2024-02-05 NOTE — PROGRESS NOTES
Medicare Annual Wellness Visit    Chepe Ramírez is here for Medicare AWV, Cholesterol Problem, Hypertension, and Blood Sugar Problem    Assessment & Plan   Medicare annual wellness visit, subsequent  Impaired fasting blood sugar  ACP (advance care planning)  -     [place] Advanced Care Planning (16-30 minutes) [89716]    Recommendations for Preventive Services Due: see orders and patient instructions/AVS.  Recommended screening schedule for the next 5-10 years is provided to the patient in written form: see Patient Instructions/AVS.  Refused all due immunization from the pharmacy  Aged out for PSA.  Up-to-date with colon cancer screening  See other note for details for chronic medical concerns.     No follow-ups on file.  Follow-up in 6 months or sooner if needed.     Subjective   See other note     Patient's complete Health Risk Assessment and screening values have been reviewed and are found in Flowsheets. The following problems were reviewed today and where indicated follow up appointments were made and/or referrals ordered.    Positive Risk Factor Screenings with Interventions:    Fall Risk:  Do you feel unsteady or are you worried about falling? : (!) yes  2 or more falls in past year?: no  Fall with injury in past year?: (!) yes     Interventions:    Reviewed medications, home hazards, visual acuity, and co-morbidities that can increase risk for falls  Also advise to use support as needed for ambulation.                Hearing Screen:  Do you or your family notice any trouble with your hearing that hasn't been managed with hearing aids?: (!) Yes    Interventions:  Following ENT. Wearing a hearing AID. Had a test yesterday as needed a new hearing AID.      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 Advise to use support while ambulating as did not have a satisfactory

## 2024-02-19 RX ORDER — LISINOPRIL 10 MG/1
TABLET ORAL
Qty: 90 TABLET | Refills: 1 | Status: SHIPPED | OUTPATIENT
Start: 2024-02-19

## 2024-02-21 RX ORDER — OMEPRAZOLE 20 MG/1
CAPSULE, DELAYED RELEASE ORAL
Qty: 180 CAPSULE | Refills: 0 | Status: SHIPPED | OUTPATIENT
Start: 2024-02-21

## 2024-02-22 DIAGNOSIS — Z96.652 STATUS POST LEFT KNEE REPLACEMENT: Primary | ICD-10-CM

## 2024-02-22 DIAGNOSIS — M25.562 CHRONIC PAIN OF LEFT KNEE: ICD-10-CM

## 2024-02-22 DIAGNOSIS — G89.29 CHRONIC PAIN OF LEFT KNEE: ICD-10-CM

## 2024-03-28 RX ORDER — ALFUZOSIN HYDROCHLORIDE 10 MG/1
TABLET, EXTENDED RELEASE ORAL
Qty: 90 TABLET | Refills: 1 | Status: SHIPPED | OUTPATIENT
Start: 2024-03-28

## 2024-04-02 RX ORDER — ATORVASTATIN CALCIUM 20 MG/1
TABLET, FILM COATED ORAL
Qty: 90 TABLET | Refills: 1 | Status: SHIPPED | OUTPATIENT
Start: 2024-04-02

## 2024-05-09 RX ORDER — OMEPRAZOLE 20 MG/1
CAPSULE, DELAYED RELEASE ORAL
Qty: 180 CAPSULE | Refills: 1 | Status: SHIPPED | OUTPATIENT
Start: 2024-05-09

## 2024-05-17 ENCOUNTER — HOSPITAL ENCOUNTER (OUTPATIENT)
Facility: HOSPITAL | Age: 79
End: 2024-05-17
Attending: ORTHOPAEDIC SURGERY
Payer: MEDICARE

## 2024-05-17 DIAGNOSIS — T84.093A FAILED TOTAL LEFT KNEE REPLACEMENT, INITIAL ENCOUNTER (HCC): ICD-10-CM

## 2024-05-17 DIAGNOSIS — M25.562 LEFT KNEE PAIN, UNSPECIFIED CHRONICITY: ICD-10-CM

## 2024-05-17 PROCEDURE — A9503 TC99M MEDRONATE: HCPCS | Performed by: ORTHOPAEDIC SURGERY

## 2024-05-17 PROCEDURE — 3430000000 HC RX DIAGNOSTIC RADIOPHARMACEUTICAL: Performed by: ORTHOPAEDIC SURGERY

## 2024-05-17 RX ORDER — TC 99M MEDRONATE 20 MG/10ML
27.5 INJECTION, POWDER, LYOPHILIZED, FOR SOLUTION INTRAVENOUS
Status: COMPLETED | OUTPATIENT
Start: 2024-05-17 | End: 2024-05-17

## 2024-05-17 RX ADMIN — TC 99M MEDRONATE 27.5 MILLICURIE: 20 INJECTION, POWDER, LYOPHILIZED, FOR SOLUTION INTRAVENOUS at 09:46

## 2024-07-18 RX ORDER — FINASTERIDE 5 MG/1
TABLET, FILM COATED ORAL
Qty: 90 TABLET | Refills: 1 | Status: SHIPPED | OUTPATIENT
Start: 2024-07-18

## 2024-08-02 SDOH — ECONOMIC STABILITY: FOOD INSECURITY: WITHIN THE PAST 12 MONTHS, YOU WORRIED THAT YOUR FOOD WOULD RUN OUT BEFORE YOU GOT MONEY TO BUY MORE.: NEVER TRUE

## 2024-08-02 SDOH — ECONOMIC STABILITY: FOOD INSECURITY: WITHIN THE PAST 12 MONTHS, THE FOOD YOU BOUGHT JUST DIDN'T LAST AND YOU DIDN'T HAVE MONEY TO GET MORE.: NEVER TRUE

## 2024-08-02 SDOH — ECONOMIC STABILITY: TRANSPORTATION INSECURITY
IN THE PAST 12 MONTHS, HAS LACK OF TRANSPORTATION KEPT YOU FROM MEETINGS, WORK, OR FROM GETTING THINGS NEEDED FOR DAILY LIVING?: NO

## 2024-08-02 SDOH — ECONOMIC STABILITY: INCOME INSECURITY: HOW HARD IS IT FOR YOU TO PAY FOR THE VERY BASICS LIKE FOOD, HOUSING, MEDICAL CARE, AND HEATING?: NOT HARD AT ALL

## 2024-08-05 ENCOUNTER — OFFICE VISIT (OUTPATIENT)
Facility: CLINIC | Age: 79
End: 2024-08-05
Payer: MEDICARE

## 2024-08-05 VITALS
RESPIRATION RATE: 16 BRPM | OXYGEN SATURATION: 99 % | HEART RATE: 68 BPM | SYSTOLIC BLOOD PRESSURE: 134 MMHG | DIASTOLIC BLOOD PRESSURE: 70 MMHG | BODY MASS INDEX: 23.52 KG/M2 | TEMPERATURE: 96.9 F | HEIGHT: 68 IN | WEIGHT: 155.2 LBS

## 2024-08-05 DIAGNOSIS — G89.29 CHRONIC PAIN OF LEFT KNEE: ICD-10-CM

## 2024-08-05 DIAGNOSIS — R73.01 IMPAIRED FASTING BLOOD SUGAR: ICD-10-CM

## 2024-08-05 DIAGNOSIS — Z01.818 PRE-OPERATIVE CLEARANCE: Primary | ICD-10-CM

## 2024-08-05 DIAGNOSIS — H91.93 HEARING PROBLEM OF BOTH EARS: ICD-10-CM

## 2024-08-05 DIAGNOSIS — E78.5 HYPERLIPIDEMIA, UNSPECIFIED HYPERLIPIDEMIA TYPE: ICD-10-CM

## 2024-08-05 DIAGNOSIS — I10 ESSENTIAL HYPERTENSION: ICD-10-CM

## 2024-08-05 DIAGNOSIS — F43.9 FEELING STRESSED OUT: ICD-10-CM

## 2024-08-05 DIAGNOSIS — N40.0 BENIGN PROSTATIC HYPERPLASIA WITHOUT LOWER URINARY TRACT SYMPTOMS: ICD-10-CM

## 2024-08-05 DIAGNOSIS — M25.562 CHRONIC PAIN OF LEFT KNEE: ICD-10-CM

## 2024-08-05 PROCEDURE — 1123F ACP DISCUSS/DSCN MKR DOCD: CPT | Performed by: FAMILY MEDICINE

## 2024-08-05 PROCEDURE — 1036F TOBACCO NON-USER: CPT | Performed by: FAMILY MEDICINE

## 2024-08-05 PROCEDURE — 3075F SYST BP GE 130 - 139MM HG: CPT | Performed by: FAMILY MEDICINE

## 2024-08-05 PROCEDURE — 99214 OFFICE O/P EST MOD 30 MIN: CPT | Performed by: FAMILY MEDICINE

## 2024-08-05 PROCEDURE — G8420 CALC BMI NORM PARAMETERS: HCPCS | Performed by: FAMILY MEDICINE

## 2024-08-05 PROCEDURE — G8427 DOCREV CUR MEDS BY ELIG CLIN: HCPCS | Performed by: FAMILY MEDICINE

## 2024-08-05 PROCEDURE — 3078F DIAST BP <80 MM HG: CPT | Performed by: FAMILY MEDICINE

## 2024-08-05 NOTE — PROGRESS NOTES
\"Have you been to the ER, urgent care clinic since your last visit?  Hospitalized since your last visit?\"    NO    “Have you seen or consulted any other health care providers outside of [facility] since your last visit?”    NO          Click Here for Release of Records Request  
Date/Time    CHOL 152 01/30/2024 07:30 AM    TRIG 75 01/30/2024 07:30 AM    HDL 94 01/30/2024 07:30 AM    CHOLHDLRATIO 1.6 01/30/2024 07:30 AM       A1c:   Hemoglobin A1C   Date Value Ref Range Status   01/30/2024 5.7 (H) 4.2 - 5.6 % Final     Comment:     (NOTE)  HbA1C Interpretive Ranges  <5.7              Normal  5.7 - 6.4         Consider Prediabetes  >6.5              Consider Diabetes     02/09/2023 5.4 4.2 - 5.6 % Final     Comment:     (NOTE)  HbA1C Interpretive Ranges  <5.7              Normal  5.7 - 6.4         Consider Prediabetes  >6.5              Consider Diabetes     12/03/2021 5.6 4.2 - 5.6 % Final     Comment:     (NOTE)  HbA1C Interpretive Ranges  <5.7              Normal  5.7 - 6.4         Consider Prediabetes  >6.5              Consider Diabetes     06/03/2020 5.9 % Final       Microalbumin:   Lab Results   Component Value Date/Time    MALBCR 5 06/03/2020 08:10 AM       TSH:   Lab Results   Component Value Date/Time    TSH 1.01 08/24/2020 08:14 AM       Vit D: No results found for: \"VITD25\"    PSA:   Lab Results   Component Value Date/Time    PSA 1.2 02/09/2023 07:45 AM    PSA 1.6 12/03/2021 08:12 AM       1. Pre-operative clearance  2. Chronic pain of left knee  3. Feeling stressed out  4. Impaired fasting blood sugar  -     Hemoglobin A1C; Future  -     CBC; Future  -     Comprehensive Metabolic Panel; Future  5. Essential hypertension  -     CBC; Future  -     Comprehensive Metabolic Panel; Future  6. Hearing problem of both ears  7. Hyperlipidemia, unspecified hyperlipidemia type  -     Lipid Panel; Future  -     CBC; Future  8. Benign prostatic hyperplasia without lower urinary tract symptoms  -     CBC; Future    No follow-ups on file.       Subjective   History of Present Illness  The patient is a 56-year-old male who presents for preoperative clearance for a left knee revision scheduled for 09/10/2024 with Dr. Anderson.    He reports no symptoms of headache, dizziness, nausea, vomiting,

## 2024-08-14 ENCOUNTER — HOSPITAL ENCOUNTER (OUTPATIENT)
Facility: HOSPITAL | Age: 79
Discharge: HOME | End: 2024-08-17
Payer: MEDICARE

## 2024-08-14 ENCOUNTER — TRANSCRIBE ORDERS (OUTPATIENT)
Facility: HOSPITAL | Age: 79
End: 2024-08-14

## 2024-08-14 DIAGNOSIS — T84.093S FAILED TOTAL LEFT KNEE REPLACEMENT, SEQUELA: ICD-10-CM

## 2024-08-14 DIAGNOSIS — T84.84XA PAIN DUE TO HIP JOINT PROSTHESIS, UNSPECIFIED LATERALITY, INITIAL ENCOUNTER (HCC): ICD-10-CM

## 2024-08-14 DIAGNOSIS — T84.84XA PAIN DUE TO HIP JOINT PROSTHESIS, UNSPECIFIED LATERALITY, INITIAL ENCOUNTER (HCC): Primary | ICD-10-CM

## 2024-08-14 DIAGNOSIS — Z96.649 PAIN DUE TO HIP JOINT PROSTHESIS, UNSPECIFIED LATERALITY, INITIAL ENCOUNTER (HCC): ICD-10-CM

## 2024-08-14 DIAGNOSIS — Z96.649 PAIN DUE TO HIP JOINT PROSTHESIS, UNSPECIFIED LATERALITY, INITIAL ENCOUNTER (HCC): Primary | ICD-10-CM

## 2024-08-14 LAB
ALBUMIN SERPL-MCNC: 3.8 G/DL (ref 3.4–5)
ALBUMIN/GLOB SERPL: 1.4 (ref 0.8–1.7)
ALP SERPL-CCNC: 70 U/L (ref 45–117)
ALT SERPL-CCNC: 16 U/L (ref 16–61)
ANION GAP SERPL CALC-SCNC: 7 MMOL/L (ref 3–18)
APPEARANCE UR: CLEAR
APTT PPP: 26.3 SEC (ref 23–36.4)
AST SERPL-CCNC: 16 U/L (ref 10–38)
BASOPHILS # BLD: 0 K/UL (ref 0–0.1)
BASOPHILS NFR BLD: 1 % (ref 0–2)
BILIRUB SERPL-MCNC: 0.7 MG/DL (ref 0.2–1)
BILIRUB UR QL: NEGATIVE
BUN SERPL-MCNC: 20 MG/DL (ref 7–18)
BUN/CREAT SERPL: 23 (ref 12–20)
CALCIUM SERPL-MCNC: 9.1 MG/DL (ref 8.5–10.1)
CHLORIDE SERPL-SCNC: 108 MMOL/L (ref 100–111)
CO2 SERPL-SCNC: 25 MMOL/L (ref 21–32)
COLOR UR: YELLOW
CREAT SERPL-MCNC: 0.86 MG/DL (ref 0.6–1.3)
DIFFERENTIAL METHOD BLD: ABNORMAL
EKG ATRIAL RATE: 66 BPM
EKG DIAGNOSIS: NORMAL
EKG P AXIS: 67 DEGREES
EKG P-R INTERVAL: 124 MS
EKG Q-T INTERVAL: 410 MS
EKG QRS DURATION: 66 MS
EKG QTC CALCULATION (BAZETT): 429 MS
EKG R AXIS: 71 DEGREES
EKG T AXIS: 59 DEGREES
EKG VENTRICULAR RATE: 66 BPM
EOSINOPHIL # BLD: 0.1 K/UL (ref 0–0.4)
EOSINOPHIL NFR BLD: 1 % (ref 0–5)
ERYTHROCYTE [DISTWIDTH] IN BLOOD BY AUTOMATED COUNT: 13.1 % (ref 11.6–14.5)
ERYTHROCYTE [SEDIMENTATION RATE] IN BLOOD: 6 MM/HR (ref 0–20)
EST. AVERAGE GLUCOSE BLD GHB EST-MCNC: 117 MG/DL
GLOBULIN SER CALC-MCNC: 2.7 G/DL (ref 2–4)
GLUCOSE SERPL-MCNC: 94 MG/DL (ref 74–99)
GLUCOSE UR STRIP.AUTO-MCNC: NEGATIVE MG/DL
HBA1C MFR BLD: 5.7 % (ref 4.2–5.6)
HCT VFR BLD AUTO: 40.8 % (ref 36–48)
HGB BLD-MCNC: 13.7 G/DL (ref 13–16)
HGB UR QL STRIP: NEGATIVE
IMM GRANULOCYTES # BLD AUTO: 0 K/UL (ref 0–0.04)
IMM GRANULOCYTES NFR BLD AUTO: 0 % (ref 0–0.5)
INR PPP: 1 (ref 0.9–1.1)
KETONES UR QL STRIP.AUTO: NEGATIVE MG/DL
LEUKOCYTE ESTERASE UR QL STRIP.AUTO: NEGATIVE
LYMPHOCYTES # BLD: 1.4 K/UL (ref 0.9–3.6)
LYMPHOCYTES NFR BLD: 22 % (ref 21–52)
MCH RBC QN AUTO: 32.9 PG (ref 24–34)
MCHC RBC AUTO-ENTMCNC: 33.6 G/DL (ref 31–37)
MCV RBC AUTO: 97.8 FL (ref 78–100)
MONOCYTES # BLD: 0.6 K/UL (ref 0.05–1.2)
MONOCYTES NFR BLD: 10 % (ref 3–10)
NEUTS SEG # BLD: 4.2 K/UL (ref 1.8–8)
NEUTS SEG NFR BLD: 67 % (ref 40–73)
NITRITE UR QL STRIP.AUTO: NEGATIVE
NRBC # BLD: 0 K/UL (ref 0–0.01)
NRBC BLD-RTO: 0 PER 100 WBC
PH UR STRIP: 5.5 (ref 5–8)
PLATELET # BLD AUTO: 224 K/UL (ref 135–420)
PMV BLD AUTO: 10.9 FL (ref 9.2–11.8)
POTASSIUM SERPL-SCNC: 4.2 MMOL/L (ref 3.5–5.5)
PROT SERPL-MCNC: 6.5 G/DL (ref 6.4–8.2)
PROT UR STRIP-MCNC: NEGATIVE MG/DL
PROTHROMBIN TIME: 13.6 SEC (ref 11.9–14.9)
RBC # BLD AUTO: 4.17 M/UL (ref 4.35–5.65)
SODIUM SERPL-SCNC: 140 MMOL/L (ref 136–145)
SP GR UR REFRACTOMETRY: 1.02 (ref 1–1.03)
UROBILINOGEN UR QL STRIP.AUTO: 0.2 EU/DL (ref 0.2–1)
WBC # BLD AUTO: 6.4 K/UL (ref 4.6–13.2)

## 2024-08-14 PROCEDURE — 85610 PROTHROMBIN TIME: CPT

## 2024-08-14 PROCEDURE — 85730 THROMBOPLASTIN TIME PARTIAL: CPT

## 2024-08-14 PROCEDURE — 81003 URINALYSIS AUTO W/O SCOPE: CPT

## 2024-08-14 PROCEDURE — 83036 HEMOGLOBIN GLYCOSYLATED A1C: CPT

## 2024-08-14 PROCEDURE — 80053 COMPREHEN METABOLIC PANEL: CPT

## 2024-08-14 PROCEDURE — 36415 COLL VENOUS BLD VENIPUNCTURE: CPT

## 2024-08-14 PROCEDURE — 85652 RBC SED RATE AUTOMATED: CPT

## 2024-08-14 PROCEDURE — 93005 ELECTROCARDIOGRAM TRACING: CPT

## 2024-08-14 PROCEDURE — 85025 COMPLETE CBC W/AUTO DIFF WBC: CPT

## 2024-08-15 LAB
BACTERIA SPEC CULT: NORMAL
BACTERIA SPEC CULT: NORMAL
SERVICE CMNT-IMP: NORMAL

## 2024-08-19 ENCOUNTER — OFFICE VISIT (OUTPATIENT)
Age: 79
End: 2024-08-19
Payer: MEDICARE

## 2024-08-19 VITALS
DIASTOLIC BLOOD PRESSURE: 80 MMHG | HEART RATE: 71 BPM | SYSTOLIC BLOOD PRESSURE: 130 MMHG | BODY MASS INDEX: 23.42 KG/M2 | OXYGEN SATURATION: 97 % | WEIGHT: 154 LBS

## 2024-08-19 DIAGNOSIS — Z01.818 PRE-OP EVALUATION: ICD-10-CM

## 2024-08-19 DIAGNOSIS — I10 ESSENTIAL HYPERTENSION WITH GOAL BLOOD PRESSURE LESS THAN 140/90: Primary | ICD-10-CM

## 2024-08-19 DIAGNOSIS — E78.00 PURE HYPERCHOLESTEROLEMIA: ICD-10-CM

## 2024-08-19 PROCEDURE — G8428 CUR MEDS NOT DOCUMENT: HCPCS | Performed by: INTERNAL MEDICINE

## 2024-08-19 PROCEDURE — 3079F DIAST BP 80-89 MM HG: CPT | Performed by: INTERNAL MEDICINE

## 2024-08-19 PROCEDURE — 3075F SYST BP GE 130 - 139MM HG: CPT | Performed by: INTERNAL MEDICINE

## 2024-08-19 PROCEDURE — G8420 CALC BMI NORM PARAMETERS: HCPCS | Performed by: INTERNAL MEDICINE

## 2024-08-19 PROCEDURE — 99214 OFFICE O/P EST MOD 30 MIN: CPT | Performed by: INTERNAL MEDICINE

## 2024-08-19 PROCEDURE — 1036F TOBACCO NON-USER: CPT | Performed by: INTERNAL MEDICINE

## 2024-08-19 PROCEDURE — 1123F ACP DISCUSS/DSCN MKR DOCD: CPT | Performed by: INTERNAL MEDICINE

## 2024-08-19 NOTE — PROGRESS NOTES
No rebound and no guarding.   Musculoskeletal: There is no lower extremity edema. No cynosis    LABS:   @  Lab Results   Component Value Date/Time    WBC 6.4 08/14/2024 11:35 AM    HGB 13.7 08/14/2024 11:35 AM    HCT 40.8 08/14/2024 11:35 AM     08/14/2024 11:35 AM    MCV 97.8 08/14/2024 11:35 AM     Lab Results   Component Value Date/Time     08/14/2024 11:35 AM    K 4.2 08/14/2024 11:35 AM     08/14/2024 11:35 AM    CO2 25 08/14/2024 11:35 AM    BUN 20 08/14/2024 11:35 AM    CREATININE 0.86 08/14/2024 11:35 AM    GLUCOSE 94 08/14/2024 11:35 AM    CALCIUM 9.1 08/14/2024 11:35 AM           Latest Ref Rng & Units 8/14/2024    11:35 AM 1/30/2024     7:30 AM 2/9/2023     7:45 AM 12/3/2021     8:12 AM 12/4/2020     1:10 PM   Lipids   Chol <200 MG/DL  152  150  155     HDL 40 - 60 MG/DL  94  93  92     LDL Calc 0 - 100 MG/DL  43  41.8  49     VLDL Calc MG/DL  15  15.2  14     Trig <150 MG/DL  75  76  70     Ratio 0 - 5.0  1.6  1.6  1.7     ALT 16 - 61 U/L 16  18  12  17  15    AST 10 - 38 U/L 16  12  12  18  14    LDL 0 - 100 MG/DL  43  41.8  49       Lab Results   Component Value Date/Time    ALT 16 08/14/2024 11:35 AM     Hemoglobin A1C   Date Value Ref Range Status   08/14/2024 5.7 (H) 4.2 - 5.6 % Final     Comment:     (NOTE)  HbA1C Interpretive Ranges  <5.7              Normal  5.7 - 6.4         Consider Prediabetes  >6.5              Consider Diabetes       Lab Results   Component Value Date    TSH 1.01 08/24/2020                           TRANSTHORACIC ECHOCARDIOGRAM (TTE) COMPLETE (CONTRAST/BUBBLE/3D PRN) 10/20/2021 11:31 AM, 10/20/2021 12:00 AM (Final)    · LV: Estimated LVEF is 55 - 60%. Visually measured ejection fraction. Normal cavity size, wall thickness and systolic function (ejection fraction normal). Wall motion: normal. Age-appropriate left ventricular diastolic function.  · TV: Mild to moderate tricuspid valve regurgitation is present.  · AO: Moderate aortic root dilatation; diameter

## 2024-08-23 RX ORDER — LISINOPRIL 10 MG/1
TABLET ORAL
Qty: 90 TABLET | Refills: 1 | Status: SHIPPED | OUTPATIENT
Start: 2024-08-23

## 2024-08-28 ENCOUNTER — ANESTHESIA EVENT (OUTPATIENT)
Facility: HOSPITAL | Age: 79
DRG: 468 | End: 2024-08-28
Payer: MEDICARE

## 2024-08-29 ENCOUNTER — TELEPHONE (OUTPATIENT)
Facility: CLINIC | Age: 79
End: 2024-08-29

## 2024-08-29 NOTE — TELEPHONE ENCOUNTER
Pt states that he started felling bad on 8/27/2024 and today tested positive today for COVID and would like to know if DR Ballard will send the RX Paxlovid to the [facility] on [address]. Please advise.

## 2024-08-30 NOTE — TELEPHONE ENCOUNTER
Lmov that that the RX had been sen to his pharmacy. Also gave instructions that if he was only having mild symptoms to not take the medication but if the get worse then to take the Paxlovid. Closing encounter as Dr Ballard has sent a [employer] message too.

## 2024-09-10 ENCOUNTER — ANESTHESIA (OUTPATIENT)
Facility: HOSPITAL | Age: 79
DRG: 468 | End: 2024-09-10
Payer: MEDICARE

## 2024-09-16 RX ORDER — LISINOPRIL 10 MG/1
10 TABLET ORAL DAILY
Qty: 90 TABLET | Refills: 1 | OUTPATIENT
Start: 2024-09-16

## 2024-09-20 RX ORDER — ALFUZOSIN HYDROCHLORIDE 10 MG/1
TABLET, EXTENDED RELEASE ORAL
Qty: 90 TABLET | Refills: 1 | Status: SHIPPED | OUTPATIENT
Start: 2024-09-20

## 2024-09-25 PROBLEM — Z96.659 FAILED TOTAL KNEE ARTHROPLASTY (HCC): Chronic | Status: ACTIVE | Noted: 2024-09-25

## 2024-09-25 PROBLEM — T84.018A FAILED TOTAL KNEE ARTHROPLASTY (HCC): Chronic | Status: ACTIVE | Noted: 2024-09-25

## 2024-09-25 NOTE — DISCHARGE INSTRUCTIONS
unable to bear any wieght   - Need a pain medication refill     Information obtained by :  I understand that if any problems occur once I am at home I am to contact my physician.    I understand and acknowledge receipt of the instructions indicated above.                                                                                                                                           Physician's or R.N.'s Signature                                                                  Date/Time                                                                                                                                              Patient or Representative Signature                                                          Date/Time      DISCHARGE SUMMARY from Nurse    PATIENT INSTRUCTIONS:    After general anesthesia or intravenous sedation, for 24 hours or while taking prescription Narcotics:  Limit your activities  Do not drive and operate hazardous machinery  Do not make important personal or business decisions  Do  not drink alcoholic beverages  If you have not urinated within 8 hours after discharge, please contact your surgeon on call.    Report the following to your surgeon:  Excessive pain, swelling, redness or odor of or around the surgical area  Temperature over 100.5  Nausea and vomiting lasting longer than 4 hours or if unable to take medications  Any signs of decreased circulation or nerve impairment to extremity: change in color, persistent  numbness, tingling, coldness or increase pain  Any questions    What to do at Home:  Recommended activity: ,   REGULAR DIET  RETURN TO OFFICE AS SCHEDULED    If you experience any of the following symptoms heavy bleeding, fevers, severe pain, circulation changes, please follow up with dr sewell.    *  Please give a list of your current medications to your Primary Care Provider.    *  Please update this list whenever your medications are discontinued, doses are    doctor has checked you carefully, but problems can develop later. If you notice any problems or new symptoms, get medical treatment right away.  Follow-up care is a key part of your treatment and safety. Be sure to make and go to all appointments, and call your doctor if you are having problems. It's also a good idea to know your test results and keep a list of the medicines you take.  How can you care for yourself at home?  Take your pain medicine as soon as you have pain. It works better if you take it before the pain gets bad.  Call your doctor if you have any problems with your medicine.  To prevent dehydration, drink plenty of fluids. Choose water and other clear liquids until you feel better. If you have kidney, heart, or liver disease and have to limit fluids, talk with your doctor before you increase the amount of fluids you drink.  When you are able to eat, try clear soups, mild foods, and liquids until all symptoms are gone for 12 to 48 hours. Other good choices include dry toast, crackers, cooked cereal, and gelatin dessert, such as Jell-O.  Do not smoke. Smoking and being around smoke can make nausea worse.   When should you call for help?    Call your doctor now or seek immediate medical care if:    You have new or worse nausea or vomiting.     You are too sick to your stomach to drink any fluids.     You cannot keep down fluids.     You have symptoms of dehydration, such as:  Dry eyes and a dry mouth.  Passing only a little urine.  Feeling thirstier than usual.     You are dizzy or lightheaded, or you feel like you may faint.           Infection After Surgery: Care Instructions  Overview  After surgery, an infection is always possible. It doesn't mean that the surgery didn't go well.  How can you care for yourself at home?  Make sure your surgeon knows about the infection, especially if you saw another doctor about your symptoms.  If your doctor prescribed antibiotics, take them as directed. Do not

## 2024-09-25 NOTE — H&P
[facility]  History and Physical Exam    Patient: Chepe Ramírez MRN: [id number]  SSN: [id number]    YOB: 1945  Age: 79 y.o.  Sex: male      Subjective:      Chief Complaint: Left knee pain    History of Present Illness:  Patient complains of pain and stiffness to the left knee and difficulty ambulating, which has progressively worsened over several months. He is s/p left tka by Dr. Noriega in January of 2023. X-rays showed tka components with the patellar component a bit overstuffed. Lab work and bone scan were negative.  The patient's pain has persisted and progressed despite conservative treatments and therapies.  The patient has been previously treated with medications and/or injections.  The patient has at this time opted for surgical intervention.       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    Plan:       Proceed with scheduled REVISION LEFT TOTAL KNEE ARTHROPLASTY.    The various methods of treatment have been discussed with the patient and family. After consideration of risks, benefits, and other options for treatment, the patient has consented to surgical interventions. Questions were answered and preoperative teaching was done by Dr Flavio Anderson.     The patient would benefit from the use of Vancomycin for antibiotic prophylaxis due to increased risk of infection.     Signed By: HAILE Neves     September 25, 2024

## 2024-09-30 RX ORDER — ATORVASTATIN CALCIUM 20 MG/1
TABLET, FILM COATED ORAL
Qty: 90 TABLET | Refills: 1 | Status: SHIPPED | OUTPATIENT
Start: 2024-09-30

## 2024-10-01 ENCOUNTER — APPOINTMENT (OUTPATIENT)
Facility: HOSPITAL | Age: 79
DRG: 468 | End: 2024-10-01
Attending: ORTHOPAEDIC SURGERY
Payer: MEDICARE

## 2024-10-01 ENCOUNTER — HOSPITAL ENCOUNTER (INPATIENT)
Facility: HOSPITAL | Age: 79
LOS: 1 days | Discharge: HOME | DRG: 468 | End: 2024-10-01
Attending: ORTHOPAEDIC SURGERY | Admitting: ORTHOPAEDIC SURGERY
Payer: MEDICARE

## 2024-10-01 VITALS
TEMPERATURE: 97.8 F | HEART RATE: 60 BPM | WEIGHT: 149.6 LBS | DIASTOLIC BLOOD PRESSURE: 66 MMHG | HEIGHT: 68 IN | BODY MASS INDEX: 22.67 KG/M2 | RESPIRATION RATE: 16 BRPM | SYSTOLIC BLOOD PRESSURE: 136 MMHG | OXYGEN SATURATION: 99 %

## 2024-10-01 DIAGNOSIS — T84.018A FAILURE OF TOTAL KNEE REPLACEMENT, INITIAL ENCOUNTER (HCC): Primary | Chronic | ICD-10-CM

## 2024-10-01 DIAGNOSIS — Z96.659 FAILURE OF TOTAL KNEE REPLACEMENT, INITIAL ENCOUNTER (HCC): Primary | Chronic | ICD-10-CM

## 2024-10-01 LAB
ABO + RH BLD: NORMAL
BLOOD GROUP ANTIBODIES SERPL: NORMAL
GLUCOSE BLD STRIP.AUTO-MCNC: 100 MG/DL (ref 70–110)
SPECIMEN EXP DATE BLD: NORMAL

## 2024-10-01 PROCEDURE — 6370000000 HC RX 637 (ALT 250 FOR IP): Performed by: ANESTHESIOLOGY

## 2024-10-01 PROCEDURE — 6360000002 HC RX W HCPCS

## 2024-10-01 PROCEDURE — 7100000010 HC PHASE II RECOVERY - FIRST 15 MIN: Performed by: ORTHOPAEDIC SURGERY

## 2024-10-01 PROCEDURE — 6360000002 HC RX W HCPCS: Performed by: ORTHOPAEDIC SURGERY

## 2024-10-01 PROCEDURE — 2580000003 HC RX 258: Performed by: PHYSICIAN ASSISTANT

## 2024-10-01 PROCEDURE — 2709999900 HC NON-CHARGEABLE SUPPLY: Performed by: ORTHOPAEDIC SURGERY

## 2024-10-01 PROCEDURE — 86850 RBC ANTIBODY SCREEN: CPT

## 2024-10-01 PROCEDURE — 7100000001 HC PACU RECOVERY - ADDTL 15 MIN: Performed by: ORTHOPAEDIC SURGERY

## 2024-10-01 PROCEDURE — 6370000000 HC RX 637 (ALT 250 FOR IP): Performed by: PHYSICIAN ASSISTANT

## 2024-10-01 PROCEDURE — 97165 OT EVAL LOW COMPLEX 30 MIN: CPT

## 2024-10-01 PROCEDURE — 82962 GLUCOSE BLOOD TEST: CPT

## 2024-10-01 PROCEDURE — 0SRD0J9 REPLACEMENT OF LEFT KNEE JOINT WITH SYNTHETIC SUBSTITUTE, CEMENTED, OPEN APPROACH: ICD-10-PCS | Performed by: ORTHOPAEDIC SURGERY

## 2024-10-01 PROCEDURE — 3700000000 HC ANESTHESIA ATTENDED CARE: Performed by: ORTHOPAEDIC SURGERY

## 2024-10-01 PROCEDURE — 2500000003 HC RX 250 WO HCPCS: Performed by: ORTHOPAEDIC SURGERY

## 2024-10-01 PROCEDURE — 36415 COLL VENOUS BLD VENIPUNCTURE: CPT

## 2024-10-01 PROCEDURE — 2500000003 HC RX 250 WO HCPCS

## 2024-10-01 PROCEDURE — 6360000002 HC RX W HCPCS: Performed by: PHYSICIAN ASSISTANT

## 2024-10-01 PROCEDURE — 7100000000 HC PACU RECOVERY - FIRST 15 MIN: Performed by: ORTHOPAEDIC SURGERY

## 2024-10-01 PROCEDURE — 2580000003 HC RX 258: Performed by: ORTHOPAEDIC SURGERY

## 2024-10-01 PROCEDURE — 86901 BLOOD TYPING SEROLOGIC RH(D): CPT

## 2024-10-01 PROCEDURE — 3600000005 HC SURGERY LEVEL 5 BASE: Performed by: ORTHOPAEDIC SURGERY

## 2024-10-01 PROCEDURE — 64447 NJX AA&/STRD FEMORAL NRV IMG: CPT | Performed by: ANESTHESIOLOGY

## 2024-10-01 PROCEDURE — 3700000001 HC ADD 15 MINUTES (ANESTHESIA): Performed by: ORTHOPAEDIC SURGERY

## 2024-10-01 PROCEDURE — C1713 ANCHOR/SCREW BN/BN,TIS/BN: HCPCS | Performed by: ORTHOPAEDIC SURGERY

## 2024-10-01 PROCEDURE — 86900 BLOOD TYPING SEROLOGIC ABO: CPT

## 2024-10-01 PROCEDURE — 97161 PT EVAL LOW COMPLEX 20 MIN: CPT

## 2024-10-01 PROCEDURE — 73560 X-RAY EXAM OF KNEE 1 OR 2: CPT

## 2024-10-01 PROCEDURE — 2500000003 HC RX 250 WO HCPCS: Performed by: ANESTHESIOLOGY

## 2024-10-01 PROCEDURE — 3600000015 HC SURGERY LEVEL 5 ADDTL 15MIN: Performed by: ORTHOPAEDIC SURGERY

## 2024-10-01 PROCEDURE — 6360000002 HC RX W HCPCS: Performed by: ANESTHESIOLOGY

## 2024-10-01 PROCEDURE — 1100000000 HC RM PRIVATE

## 2024-10-01 PROCEDURE — 7100000011 HC PHASE II RECOVERY - ADDTL 15 MIN: Performed by: ORTHOPAEDIC SURGERY

## 2024-10-01 PROCEDURE — 0SPD0JZ REMOVAL OF SYNTHETIC SUBSTITUTE FROM LEFT KNEE JOINT, OPEN APPROACH: ICD-10-PCS | Performed by: ORTHOPAEDIC SURGERY

## 2024-10-01 PROCEDURE — C1776 JOINT DEVICE (IMPLANTABLE): HCPCS | Performed by: ORTHOPAEDIC SURGERY

## 2024-10-01 DEVICE — CEMENT BONE 40GM HI VISC PALACOS R: Type: IMPLANTABLE DEVICE | Site: KNEE | Status: FUNCTIONAL

## 2024-10-01 DEVICE — IMPLANT PAT DIA35MM THK10MM X3 ASYM TRIATHLON: Type: IMPLANTABLE DEVICE | Site: KNEE | Status: FUNCTIONAL

## 2024-10-01 DEVICE — BASEPLATE TIB SZ 5 UNIV KNEE TRITANIUM TOT STBL CEM: Type: IMPLANTABLE DEVICE | Site: KNEE | Status: FUNCTIONAL

## 2024-10-01 DEVICE — COMPONENT FEM SZ 4 L KNEE TOT STBL TRIATHLON: Type: IMPLANTABLE DEVICE | Site: KNEE | Status: FUNCTIONAL

## 2024-10-01 DEVICE — ADAPTER FEM DIA2MM KNEE TOT STBL OFFSET REV TRIATHLON: Type: IMPLANTABLE DEVICE | Site: KNEE | Status: FUNCTIONAL

## 2024-10-01 DEVICE — IMPLANTABLE DEVICE: Type: IMPLANTABLE DEVICE | Site: KNEE | Status: FUNCTIONAL

## 2024-10-01 DEVICE — EXTENSION STEM L25MM CO CHROM TOT STBL TRIATHLON: Type: IMPLANTABLE DEVICE | Site: KNEE | Status: FUNCTIONAL

## 2024-10-01 DEVICE — STEM FEM L100MM DIA14MM KNEE TOT STBL END CEMENTLESS: Type: IMPLANTABLE DEVICE | Site: KNEE | Status: FUNCTIONAL

## 2024-10-01 RX ORDER — ASPIRIN 81 MG/1
81 TABLET ORAL 2 TIMES DAILY
Qty: 42 TABLET | Refills: 0 | Status: SHIPPED | OUTPATIENT
Start: 2024-10-01 | End: 2024-10-22

## 2024-10-01 RX ORDER — OXYCODONE HYDROCHLORIDE 5 MG/1
5 TABLET ORAL EVERY 4 HOURS PRN
Status: DISCONTINUED | OUTPATIENT
Start: 2024-10-01 | End: 2024-10-01 | Stop reason: HOSPADM

## 2024-10-01 RX ORDER — CEFADROXIL 500 MG/1
500 CAPSULE ORAL 2 TIMES DAILY
Qty: 10 CAPSULE | Refills: 0 | Status: SHIPPED | OUTPATIENT
Start: 2024-10-01 | End: 2024-10-06

## 2024-10-01 RX ORDER — DIPHENHYDRAMINE HYDROCHLORIDE 50 MG/ML
12.5 INJECTION INTRAMUSCULAR; INTRAVENOUS
Status: DISCONTINUED | OUTPATIENT
Start: 2024-10-01 | End: 2024-10-01 | Stop reason: HOSPADM

## 2024-10-01 RX ORDER — HYDROMORPHONE HYDROCHLORIDE 1 MG/ML
0.5 INJECTION, SOLUTION INTRAMUSCULAR; INTRAVENOUS; SUBCUTANEOUS EVERY 5 MIN PRN
Status: DISCONTINUED | OUTPATIENT
Start: 2024-10-01 | End: 2024-10-01 | Stop reason: HOSPADM

## 2024-10-01 RX ORDER — IPRATROPIUM BROMIDE AND ALBUTEROL SULFATE 2.5; .5 MG/3ML; MG/3ML
1 SOLUTION RESPIRATORY (INHALATION)
Status: DISCONTINUED | OUTPATIENT
Start: 2024-10-01 | End: 2024-10-01 | Stop reason: HOSPADM

## 2024-10-01 RX ORDER — SODIUM CHLORIDE, SODIUM LACTATE, POTASSIUM CHLORIDE, CALCIUM CHLORIDE 600; 310; 30; 20 MG/100ML; MG/100ML; MG/100ML; MG/100ML
INJECTION, SOLUTION INTRAVENOUS CONTINUOUS
Status: DISCONTINUED | OUTPATIENT
Start: 2024-10-01 | End: 2024-10-01 | Stop reason: HOSPADM

## 2024-10-01 RX ORDER — MIDAZOLAM HYDROCHLORIDE 1 MG/ML
INJECTION INTRAMUSCULAR; INTRAVENOUS
Status: COMPLETED
Start: 2024-10-01 | End: 2024-10-01

## 2024-10-01 RX ORDER — SODIUM CHLORIDE, SODIUM LACTATE, POTASSIUM CHLORIDE, AND CALCIUM CHLORIDE .6; .31; .03; .02 G/100ML; G/100ML; G/100ML; G/100ML
1000 INJECTION, SOLUTION INTRAVENOUS ONCE
Status: DISCONTINUED | OUTPATIENT
Start: 2024-10-01 | End: 2024-10-01 | Stop reason: HOSPADM

## 2024-10-01 RX ORDER — EPHEDRINE SULFATE 5 MG/ML
INJECTION INTRAVENOUS
Status: DISCONTINUED | OUTPATIENT
Start: 2024-10-01 | End: 2024-10-01 | Stop reason: SDUPTHER

## 2024-10-01 RX ORDER — FENTANYL CITRATE 50 UG/ML
25 INJECTION, SOLUTION INTRAMUSCULAR; INTRAVENOUS EVERY 5 MIN PRN
Status: DISCONTINUED | OUTPATIENT
Start: 2024-10-01 | End: 2024-10-01 | Stop reason: HOSPADM

## 2024-10-01 RX ORDER — TRANEXAMIC ACID 650 MG/1
1950 TABLET ORAL ONCE
Status: COMPLETED | OUTPATIENT
Start: 2024-10-01 | End: 2024-10-01

## 2024-10-01 RX ORDER — SODIUM CHLORIDE 0.9 % (FLUSH) 0.9 %
5-40 SYRINGE (ML) INJECTION PRN
Status: DISCONTINUED | OUTPATIENT
Start: 2024-10-01 | End: 2024-10-01 | Stop reason: HOSPADM

## 2024-10-01 RX ORDER — DEXAMETHASONE SODIUM PHOSPHATE 4 MG/ML
8 INJECTION, SOLUTION INTRA-ARTICULAR; INTRALESIONAL; INTRAMUSCULAR; INTRAVENOUS; SOFT TISSUE ONCE
Status: DISCONTINUED | OUTPATIENT
Start: 2024-10-01 | End: 2024-10-01

## 2024-10-01 RX ORDER — SODIUM CHLORIDE 0.9 % (FLUSH) 0.9 %
5-40 SYRINGE (ML) INJECTION EVERY 12 HOURS SCHEDULED
Status: DISCONTINUED | OUTPATIENT
Start: 2024-10-01 | End: 2024-10-01 | Stop reason: HOSPADM

## 2024-10-01 RX ORDER — ONDANSETRON 2 MG/ML
4 INJECTION INTRAMUSCULAR; INTRAVENOUS
Status: DISCONTINUED | OUTPATIENT
Start: 2024-10-01 | End: 2024-10-01 | Stop reason: HOSPADM

## 2024-10-01 RX ORDER — KETOROLAC TROMETHAMINE 15 MG/ML
15 INJECTION, SOLUTION INTRAMUSCULAR; INTRAVENOUS EVERY 6 HOURS
Status: DISCONTINUED | OUTPATIENT
Start: 2024-10-01 | End: 2024-10-01 | Stop reason: HOSPADM

## 2024-10-01 RX ORDER — LIDOCAINE HYDROCHLORIDE 20 MG/ML
INJECTION, SOLUTION EPIDURAL; INFILTRATION; INTRACAUDAL; PERINEURAL
Status: DISCONTINUED | OUTPATIENT
Start: 2024-10-01 | End: 2024-10-01 | Stop reason: SDUPTHER

## 2024-10-01 RX ORDER — MIDAZOLAM HYDROCHLORIDE 1 MG/ML
INJECTION INTRAMUSCULAR; INTRAVENOUS
Status: COMPLETED | OUTPATIENT
Start: 2024-10-01 | End: 2024-10-01

## 2024-10-01 RX ORDER — DIPHENHYDRAMINE HCL 25 MG
25 CAPSULE ORAL EVERY 6 HOURS PRN
Status: DISCONTINUED | OUTPATIENT
Start: 2024-10-01 | End: 2024-10-01 | Stop reason: HOSPADM

## 2024-10-01 RX ORDER — DEXMEDETOMIDINE HYDROCHLORIDE 100 UG/ML
INJECTION, SOLUTION INTRAVENOUS
Status: DISCONTINUED | OUTPATIENT
Start: 2024-10-01 | End: 2024-10-01 | Stop reason: SDUPTHER

## 2024-10-01 RX ORDER — OXYCODONE HYDROCHLORIDE 5 MG/1
10 TABLET ORAL EVERY 4 HOURS PRN
Status: DISCONTINUED | OUTPATIENT
Start: 2024-10-01 | End: 2024-10-01 | Stop reason: HOSPADM

## 2024-10-01 RX ORDER — ROPIVACAINE HYDROCHLORIDE 5 MG/ML
INJECTION, SOLUTION EPIDURAL; INFILTRATION; PERINEURAL
Status: COMPLETED | OUTPATIENT
Start: 2024-10-01 | End: 2024-10-01

## 2024-10-01 RX ORDER — DROPERIDOL 2.5 MG/ML
0.62 INJECTION, SOLUTION INTRAMUSCULAR; INTRAVENOUS
Status: DISCONTINUED | OUTPATIENT
Start: 2024-10-01 | End: 2024-10-01 | Stop reason: HOSPADM

## 2024-10-01 RX ORDER — DEXAMETHASONE SODIUM PHOSPHATE 4 MG/ML
8 INJECTION, SOLUTION INTRA-ARTICULAR; INTRALESIONAL; INTRAMUSCULAR; INTRAVENOUS; SOFT TISSUE ONCE
Status: COMPLETED | OUTPATIENT
Start: 2024-10-01 | End: 2024-10-01

## 2024-10-01 RX ORDER — ACETAMINOPHEN 500 MG
1000 TABLET ORAL ONCE
Status: COMPLETED | OUTPATIENT
Start: 2024-10-01 | End: 2024-10-01

## 2024-10-01 RX ORDER — MEPERIDINE HYDROCHLORIDE 50 MG/ML
12.5 INJECTION INTRAMUSCULAR; INTRAVENOUS; SUBCUTANEOUS AS NEEDED
Status: DISCONTINUED | OUTPATIENT
Start: 2024-10-01 | End: 2024-10-01 | Stop reason: HOSPADM

## 2024-10-01 RX ORDER — OXYCODONE HYDROCHLORIDE 5 MG/1
5 TABLET ORAL
Status: COMPLETED | OUTPATIENT
Start: 2024-10-01 | End: 2024-10-01

## 2024-10-01 RX ORDER — DEXAMETHASONE SODIUM PHOSPHATE 4 MG/ML
4 INJECTION, SOLUTION INTRA-ARTICULAR; INTRALESIONAL; INTRAMUSCULAR; INTRAVENOUS; SOFT TISSUE ONCE
Status: COMPLETED | OUTPATIENT
Start: 2024-10-01 | End: 2024-10-01

## 2024-10-01 RX ORDER — PANTOPRAZOLE SODIUM 40 MG/1
40 TABLET, DELAYED RELEASE ORAL ONCE
Status: DISCONTINUED | OUTPATIENT
Start: 2024-10-01 | End: 2024-10-01 | Stop reason: HOSPADM

## 2024-10-01 RX ORDER — SODIUM CHLORIDE 9 MG/ML
INJECTION, SOLUTION INTRAVENOUS CONTINUOUS
Status: DISCONTINUED | OUTPATIENT
Start: 2024-10-01 | End: 2024-10-01 | Stop reason: HOSPADM

## 2024-10-01 RX ORDER — LABETALOL HYDROCHLORIDE 5 MG/ML
10 INJECTION, SOLUTION INTRAVENOUS
Status: DISCONTINUED | OUTPATIENT
Start: 2024-10-01 | End: 2024-10-01 | Stop reason: HOSPADM

## 2024-10-01 RX ORDER — DIPHENHYDRAMINE HYDROCHLORIDE 50 MG/ML
25 INJECTION INTRAMUSCULAR; INTRAVENOUS EVERY 6 HOURS PRN
Status: DISCONTINUED | OUTPATIENT
Start: 2024-10-01 | End: 2024-10-01 | Stop reason: HOSPADM

## 2024-10-01 RX ORDER — NALOXONE HYDROCHLORIDE 0.4 MG/ML
INJECTION, SOLUTION INTRAMUSCULAR; INTRAVENOUS; SUBCUTANEOUS PRN
Status: DISCONTINUED | OUTPATIENT
Start: 2024-10-01 | End: 2024-10-01 | Stop reason: HOSPADM

## 2024-10-01 RX ORDER — MELOXICAM 7.5 MG/1
7.5 TABLET ORAL 2 TIMES DAILY
Qty: 28 TABLET | Refills: 0 | Status: SHIPPED | OUTPATIENT
Start: 2024-10-01 | End: 2024-10-15

## 2024-10-01 RX ORDER — PROPOFOL 10 MG/ML
INJECTION, EMULSION INTRAVENOUS
Status: DISCONTINUED | OUTPATIENT
Start: 2024-10-01 | End: 2024-10-01 | Stop reason: SDUPTHER

## 2024-10-01 RX ORDER — FINASTERIDE 5 MG/1
5 TABLET, FILM COATED ORAL DAILY
Status: DISCONTINUED | OUTPATIENT
Start: 2024-10-01 | End: 2024-10-01 | Stop reason: HOSPADM

## 2024-10-01 RX ORDER — DEXMEDETOMIDINE HYDROCHLORIDE 100 UG/ML
INJECTION, SOLUTION INTRAVENOUS
Status: COMPLETED | OUTPATIENT
Start: 2024-10-01 | End: 2024-10-01

## 2024-10-01 RX ORDER — OXYCODONE HYDROCHLORIDE 5 MG/1
5 TABLET ORAL EVERY 4 HOURS PRN
Qty: 42 TABLET | Refills: 0 | Status: SHIPPED | OUTPATIENT
Start: 2024-10-01 | End: 2024-10-08

## 2024-10-01 RX ORDER — ACETAMINOPHEN 325 MG/1
650 TABLET ORAL EVERY 6 HOURS
Status: DISCONTINUED | OUTPATIENT
Start: 2024-10-01 | End: 2024-10-01 | Stop reason: HOSPADM

## 2024-10-01 RX ORDER — ONDANSETRON 2 MG/ML
4 INJECTION INTRAMUSCULAR; INTRAVENOUS EVERY 6 HOURS PRN
Status: DISCONTINUED | OUTPATIENT
Start: 2024-10-01 | End: 2024-10-01 | Stop reason: HOSPADM

## 2024-10-01 RX ORDER — SODIUM CHLORIDE 9 MG/ML
INJECTION, SOLUTION INTRAVENOUS PRN
Status: DISCONTINUED | OUTPATIENT
Start: 2024-10-01 | End: 2024-10-01 | Stop reason: HOSPADM

## 2024-10-01 RX ADMIN — MIDAZOLAM 2 MG: 1 INJECTION INTRAMUSCULAR; INTRAVENOUS at 08:22

## 2024-10-01 RX ADMIN — OXYCODONE 5 MG: 5 TABLET ORAL at 12:45

## 2024-10-01 RX ADMIN — SODIUM CHLORIDE, SODIUM LACTATE, POTASSIUM CHLORIDE, AND CALCIUM CHLORIDE: 600; 310; 30; 20 INJECTION, SOLUTION INTRAVENOUS at 10:52

## 2024-10-01 RX ADMIN — EPHEDRINE SULFATE 5 MG: 5 INJECTION INTRAVENOUS at 10:36

## 2024-10-01 RX ADMIN — ROPIVACAINE HYDROCHLORIDE 20 ML: 5 INJECTION EPIDURAL; INFILTRATION; PERINEURAL at 08:24

## 2024-10-01 RX ADMIN — LIDOCAINE HYDROCHLORIDE 100 MG: 20 INJECTION, SOLUTION EPIDURAL; INFILTRATION; INTRACAUDAL; PERINEURAL at 09:19

## 2024-10-01 RX ADMIN — DEXMEDETOMIDINE HYDROCHLORIDE 0.2 ML: 100 INJECTION, SOLUTION INTRAVENOUS at 08:24

## 2024-10-01 RX ADMIN — VANCOMYCIN HYDROCHLORIDE 1000 MG: 1 INJECTION, POWDER, LYOPHILIZED, FOR SOLUTION INTRAVENOUS at 08:30

## 2024-10-01 RX ADMIN — PROPOFOL 25 MG: 10 INJECTION, EMULSION INTRAVENOUS at 09:22

## 2024-10-01 RX ADMIN — WATER 2000 MG: 1 INJECTION INTRAMUSCULAR; INTRAVENOUS; SUBCUTANEOUS at 08:22

## 2024-10-01 RX ADMIN — TRANEXAMIC ACID 1950 MG: 650 TABLET ORAL at 06:53

## 2024-10-01 RX ADMIN — PROPOFOL 25 MG: 10 INJECTION, EMULSION INTRAVENOUS at 09:19

## 2024-10-01 RX ADMIN — DEXAMETHASONE SODIUM PHOSPHATE 4 MG: 4 INJECTION INTRA-ARTICULAR; INTRALESIONAL; INTRAMUSCULAR; INTRAVENOUS; SOFT TISSUE at 06:53

## 2024-10-01 RX ADMIN — EPHEDRINE SULFATE 5 MG: 5 INJECTION INTRAVENOUS at 10:52

## 2024-10-01 RX ADMIN — SODIUM CHLORIDE, SODIUM LACTATE, POTASSIUM CHLORIDE, AND CALCIUM CHLORIDE 1000 ML: .6; .31; .03; .02 INJECTION, SOLUTION INTRAVENOUS at 06:54

## 2024-10-01 RX ADMIN — MEPIVACAINE HYDROCHLORIDE 45 MG: 15 INJECTION, SOLUTION EPIDURAL; INFILTRATION at 09:16

## 2024-10-01 RX ADMIN — ACETAMINOPHEN 1000 MG: 500 TABLET ORAL at 06:53

## 2024-10-01 RX ADMIN — DEXAMETHASONE SODIUM PHOSPHATE 8 MG: 4 INJECTION INTRA-ARTICULAR; INTRALESIONAL; INTRAMUSCULAR; INTRAVENOUS; SOFT TISSUE at 11:47

## 2024-10-01 RX ADMIN — SODIUM CHLORIDE, SODIUM LACTATE, POTASSIUM CHLORIDE, AND CALCIUM CHLORIDE: 600; 310; 30; 20 INJECTION, SOLUTION INTRAVENOUS at 06:54

## 2024-10-01 RX ADMIN — PROPOFOL 75 MCG/KG/MIN: 10 INJECTION, EMULSION INTRAVENOUS at 09:23

## 2024-10-01 RX ADMIN — DEXMEDETOMIDINE HYDROCHLORIDE 4 MCG: 100 INJECTION, SOLUTION INTRAVENOUS at 08:22

## 2024-10-01 ASSESSMENT — PAIN - FUNCTIONAL ASSESSMENT
PAIN_FUNCTIONAL_ASSESSMENT: 0-10
PAIN_FUNCTIONAL_ASSESSMENT: 0-10
PAIN_FUNCTIONAL_ASSESSMENT: NONE - DENIES PAIN
PAIN_FUNCTIONAL_ASSESSMENT: ACTIVITIES ARE NOT PREVENTED
PAIN_FUNCTIONAL_ASSESSMENT: ACTIVITIES ARE NOT PREVENTED
PAIN_FUNCTIONAL_ASSESSMENT: NONE - DENIES PAIN
PAIN_FUNCTIONAL_ASSESSMENT: 0-10

## 2024-10-01 ASSESSMENT — PAIN DESCRIPTION - DESCRIPTORS
DESCRIPTORS: ACHING
DESCRIPTORS: ACHING;DULL

## 2024-10-01 ASSESSMENT — PAIN SCALES - GENERAL
PAINLEVEL_OUTOF10: 6
PAINLEVEL_OUTOF10: 5

## 2024-10-01 ASSESSMENT — PAIN DESCRIPTION - LOCATION
LOCATION: KNEE
LOCATION: KNEE

## 2024-10-01 ASSESSMENT — ENCOUNTER SYMPTOMS: SHORTNESS OF BREATH: 1

## 2024-10-01 NOTE — ANESTHESIA PROCEDURE NOTES
Spinal Block    Patient location during procedure: OR  End time: 10/1/2024 9:16 AM  Reason for block: procedure for pain, primary anesthetic and at surgeon's request  Staffing  Performed: resident/CRNA   Resident/CRNA: Dominique Samuels APRN - CRNA  Performed by: Dominique Samuels APRN - CRNA  Authorized by: Jerry Salinas MD    Spinal Block  Patient position: sitting  Prep: ChloraPrep and site prepped and draped  Patient monitoring: cardiac monitor, continuous pulse ox and frequent blood pressure checks  Approach: midline  Location: L3/L4  Guidance: landmark technique  Provider prep: mask and sterile gloves  Local infiltration: lidocaine  Needle  Needle type: Sprotte Tip   Needle gauge: 25 G  Needle length: 3.5 in  Kit: ARROW  Expiration date: 12/31/2025  Assessment  Swirl obtained: Yes  CSF: clear  Attempts: 2  Hemodynamics: stable  Additional Notes  1st attempt failed but was successful after repositioning patient.    LOT # [id number]  Preanesthetic Checklist  Completed: patient identified, IV checked, site marked, risks and benefits discussed, surgical/procedural consents, equipment checked, pre-op evaluation, timeout performed, anesthesia consent given, oxygen available, monitors applied/VS acknowledged, fire risk safety assessment completed and verbalized and blood product R/B/A discussed and consented

## 2024-10-01 NOTE — BRIEF OP NOTE
Brief Postoperative Note      Patient: Chepe Ramírez  YOB: 1945  MRN: [id number]    Date of Procedure: 10/1/2024    Pre-Op Diagnosis Codes:      * Pain due to total knee replacement, initial encounter (Formerly [facility]) [T84.84XA, Z96.659]     * Failed total left knee replacement, initial encounter (Formerly [facility]) [T84.093A]    Post-Op Diagnosis: Same       Procedure(s):  REVISION OF LEFT TOTAL KNEE REPLACEMENT    Surgeon(s):  Flavio Anderson MD    Assistant:  Physician Assistant: Bennett Ulloa PA    Anesthesia: Spinal    Estimated Blood Loss (mL): less than 100     Complications: None    Specimens:   * No specimens in log *    Implants:  Implant Name Type Inv. Item Serial No.  Lot No. LRB No. Used Action   CEMENT BONE 40GM HI VISC PALACOS R - [id number]  CEMENT BONE 40GM HI VISC PALACOS R  [employer] [id number] Left 2 Implanted   AUGMENT FEM SZ 4 THK5MM L DST KNEE CO CHROM TOT STBL REV - [id number]  AUGMENT FEM SZ 4 THK5MM L DST KNEE CO CHROM TOT STBL REV  [employer] [facility] [id number] Left 2 Implanted   INSERT TIB BEAR SZ 5 [id number] KNEE X3 POST STBL TRIATHLON - [id number]  INSERT TIB BEAR SZ 5 [id number] KNEE X3 POST STBL TRIATHLON  [employer] [facility] [id number] Left 1 Implanted   ADAPTER FEM DIA2MM KNEE TOT STBL OFFSET REV TRIATHLON - [id number]  ADAPTER FEM DIA2MM KNEE TOT STBL OFFSET REV TRIATHLON  [employer] [facility] [id number] Left 1 Implanted   BASEPLATE TIB SZ 5 UNIV KNEE TRITANIUM TOT STBL ELIGIO - [id number]  BASEPLATE TIB SZ 5 UNIV KNEE TRITANIUM TOT STBL ELIGIO  [employer] [facility] [id number] Left 1 Implanted   IMPLANT PAT [id number] X3 ASYM TRIATHLON - [id number]  IMPLANT PAT [id number] X3 ASYM TRIATHLON  [employer] [facility] [id number] Left 1 Implanted   EXTENSION STEM L25MM CO CHROM TOT STBL TRIATHLON - [id number]  EXTENSION STEM L25MM CO CHROM TOT STBL TRIATHLON  [employer] HOWM-WD [id number] Left 1 Implanted   STEM FEM L100MM [id number] KNEE TOT STBL END

## 2024-10-01 NOTE — PERIOP NOTE
Pt. Used restroom in pre-op area with assistance.   Patient placed on [employer] for a minimum of 30 min in  Preop.

## 2024-10-01 NOTE — ANESTHESIA POSTPROCEDURE EVALUATION
Post-Anesthesia Evaluation and Assessment    Cardiovascular Function/Vital Signs  Visit Vitals  /67   Pulse 63   Temp 97.2 °F (36.2 °C) (Temporal)   Resp 23   Ht 1.727 m (5' 7.99\")   Wt 67.9 kg (149 lb 9.6 oz)   SpO2 98%   BMI 22.75 kg/m²       Patient is status post Procedure(s):  REVISION OF LEFT TOTAL KNEE REPLACEMENT.    Nausea/Vomiting: Controlled.    Postoperative hydration reviewed and adequate.    Pain:      Managed.    Neurological Status:       At baseline.    Mental Status and Level of Consciousness: Baseline and appropriate for discharge.    Pulmonary Status:       Adequate oxygenation and airway patent.    Complications related to anesthesia: None    Post-anesthesia assessment completed. No concerns.    Patient has met all discharge requirements.    Signed By: NISHA SUMMERS MD    October 1, 2024

## 2024-10-01 NOTE — ANESTHESIA PRE PROCEDURE
of breath 2021    pt states he had reaction to insulation/ cardiologist/ pt states pcp sent him.    Sore throat 08/28/2024    pt wife has covid/wearing mask so far negative; will test again tomorrow and notify MD and PAT    Wears dentures     no adhesive used    Wears glasses        Past Surgical History:        Procedure Laterality Date    CARPAL TUNNEL RELEASE      COLONOSCOPY N/A 2/22/2019    COLONOSCOPY, SURVEILLANCE performed by Payton Orellana MD at [facility] ENDOSCOPY    COLONOSCOPY      HERNIA REPAIR  03/14/2017    MALIGNANT SKIN LESION EXCISION      TOTAL KNEE ARTHROPLASTY Left 2023       Social History:    Social History     Tobacco Use    Smoking status: Former    Smokeless tobacco: Never   Substance Use Topics    Alcohol use: Yes     Alcohol/week: 1.7 standard drinks of alcohol     Types: 2 Standard drinks or equivalent per week     Comment: sometimes a glass of wine a day                                Counseling given: Not Answered      Vital Signs (Current):   Vitals:    10/01/24 0628 10/01/24 0649   BP:  134/76   Pulse:  57   Resp:  16   Temp:  97.3 °F (36.3 °C)   TempSrc:  Temporal   SpO2:  100%   Weight: 67.9 kg (149 lb 9.6 oz)    Height: 1.727 m (5' 7.99\")                                               BP Readings from Last 3 Encounters:   10/01/24 134/76   08/19/24 130/80   08/05/24 134/70       NPO Status: Time of last liquid consumption:  (clears > 2h)                        Time of last solid consumption:  (solids > 8h)                        Date of last liquid consumption: 09/30/24                        Date of last solid food consumption: 09/30/24    BMI:   Wt Readings from Last 3 Encounters:   10/01/24 67.9 kg (149 lb 9.6 oz)   09/24/24 68 kg (150 lb)   08/28/24 69.4 kg (153 lb)     Body mass index is 22.75 kg/m².    CBC:   Lab Results   Component Value Date/Time    WBC 6.4 08/14/2024 11:35 AM    RBC 4.17 08/14/2024 11:35 AM    HGB 13.7 08/14/2024 11:35 AM    HCT 40.8 08/14/2024 11:35 AM    MCV

## 2024-10-01 NOTE — INTERVAL H&P NOTE
Update History & Physical    The patient's History and Physical of September 25, 2024 was reviewed with the patient and I examined the patient. There was no change. The surgical site was confirmed by the patient and me.     Plan: The risks, benefits, expected outcome, and alternative to the recommended procedure have been discussed with the patient. Patient understands and wants to proceed with the procedure.     Electronically signed by ANITA JOSEPH MD on 10/1/2024 at 7:16 AM

## 2024-10-01 NOTE — PROGRESS NOTES
History:   Procedure Laterality Date    CARPAL TUNNEL RELEASE      COLONOSCOPY N/A 2/22/2019    COLONOSCOPY, SURVEILLANCE performed by Payton Orellana MD at [facility] ENDOSCOPY    COLONOSCOPY      HERNIA REPAIR  03/14/2017    MALIGNANT SKIN LESION EXCISION      TOTAL KNEE ARTHROPLASTY Left 2023     Barriers to Learning/Limitations: post anesthetic effects  Compensate with: visual, verbal, tactile, kinesthetic cues/model    Home Situation:   Social/Functional History  Lives With: Spouse  Type of Home: House  Home Layout: One level  Home Access: Stairs to enter with rails  Entrance Stairs - Number of Steps: 4  Entrance Stairs - Rails: Left  Bathroom Shower/Tub: Walk-in shower  Bathroom Equipment: Built-in shower seat, Grab bars in shower, 3-in-1 commode  Home Equipment: Cane, Walker - Rolling  Receives Help From: Family  ADL Assistance: Independent  Ambulation Assistance: Independent  []  Right hand dominant   []  Left hand dominant    Cognitive/Behavioral Status:  Orientation Level: Oriented to place;Oriented to situation;Oriented to person  WFL                                   Vision/Perceptual:    Vision: Within Functional Limits                          Coordination: BUE  Coordination: Generally decreased, functional            Balance:          Strength: BUE     Strength: Generally decreased, functional    Tone & Sensation: BUE                Range of Motion: BUE        AROM: Generally decreased, functional  PROM: Generally decreased, functional      Functional Mobility and Transfers for ADLs:  Bed Mobility:     Bed Mobility Training  Bed Mobility Training: No  Transfers:                 Transfer Training  Transfer Training: Yes  Interventions: Safety awareness training;Tactile cues;Verbal cues  Sit to Stand: Contact-guard assistance  Stand to Sit: Contact-guard assistance    ADL Assessment:                  UE Dressing: Supervision  LE Dressing: Minimal assistance  Toileting: Contact guard assistance    ADL  Intervention:    Therapeutic Exercise/Neuromuscular Re-education:    Pain:  Pain level pre-treatment: 6/10   Pain level post-treatment: NR/10   Pain Intervention(s): Rest, Ice, Repositioning   Response to intervention: Nurse notified regarding pt's performance with therapy.     Activity Tolerance:   Activity Tolerance: Patient Tolerated treatment well  Please refer to the flowsheet for vital signs taken during this treatment.    After treatment:   [x] Patient left in no apparent distress sitting up in chair  [] Patient left in no apparent distress in bed  [x] Call bell left within reach  [x] Nursing notified  [] Caregiver present  [] Bed alarm activated    COMMUNICATION/EDUCATION:   Patient Education  Education Given To: Patient;Family  Education Provided: Fall Prevention Strategies;Precautions;Plan of Care;ADL Adaptive Strategies;Transfer Training;Equipment;Energy Conservation;Family Education;Role of Therapy  Education Method: Demonstration;Verbal  Barriers to Learning: Other (Comment) (post anesthetic effects)  Education Outcome: Verbalized understanding    Thank you for this referral.  Elsie Garcia OT  Minutes: 14    Eval Complexity: Decision Making: Low Complexity    [facility] AM-PAC® Daily Activity Inpatient Short Form (6-Clicks)*    How much HELP from another person does the patient currently need…    (If the patient hasn't done an activity recently, how much help from another person do you think he/she would need if he/she tried?)   Total (Total A or Dep)   A Lot  (Mod to Max A)   A Little (Sup or Min A)   None (Mod I to I)   Putting on and taking off regular lower body clothing?   [] 1 [] 2 [x] 3 [] 4   2. Bathing (including washing, rinsing,      drying)?    [] 1 [] 2 [x] 3 [] 4   3. Toileting, which includes using toilet, bedpan or urinal?   [] 1 [] 2 [x] 3 [] 4   4. Putting on and taking off regular upper body clothing?   [] 1 [] 2 [x] 3 [] 4   5. Taking care of personal grooming such as brushing

## 2024-10-01 NOTE — PERIOP NOTE
TRANSFER - OUT REPORT:    Verbal report given to Jewell LOTT  on Chepe Ramírez  being transferred to Phase 2  for routine progression of patient care       Report consisted of patient's Situation, Background, Assessment and   Recommendations(SBAR).     Information from the following report(s) Adult Overview, Surgery Report, Intake/Output, and MAR was reviewed with the receiving nurse.           Lines:   Peripheral IV 10/01/24 Left Forearm (Active)   Site Assessment Clean, dry & intact 10/01/24 1129   Line Status Infusing 10/01/24 1129   Line Care Connections checked and tightened 10/01/24 0906   Phlebitis Assessment No symptoms 10/01/24 1129   Infiltration Assessment 0 10/01/24 1129   Alcohol Cap Used No 10/01/24 0654   Dressing Status Clean, dry & intact 10/01/24 1129   Dressing Type Transparent 10/01/24 1129   Dressing Intervention New 10/01/24 0654        Opportunity for questions and clarification was provided.      Patient transported with:  Registered Nurse

## 2024-10-01 NOTE — PROGRESS NOTES
Physical Therapy Goals:  Pt goals to be met in 1 day:  Pt will be able to perform supine<>sit SBA for transfer ease at home.  Pt will be able to perform sit<>stand SBA for increased ability to transfer at home safely.  Pt will be able to participate in gait training >100' w/ RW, WBAT, GB and CGA/SBA for improved ability in home upon d/c.  Pt will be able to perform stair training step to pattern, B/U rail and CGA to obtain safe entry into home upon d/c.  Pt will be educated regarding HEP per MD protocol for optimal AROM/strength outcomes.        [x]  Patient has met MD mobilization jerome for d/c home   [x]  HH with 24 hour adult care   []  Benefit from additional acute PT session to address:      PHYSICAL THERAPY EVALUATION    Patient: Chepe Ramírez (79 y.o. male)  Date: 10/1/2024  Primary Diagnosis: Pain due to total knee replacement, initial encounter ([facility]) [T84.84XA, Z96.659]  Failed total left knee replacement, initial encounter ([facility]) [T84.093A]  Failed total knee arthroplasty, initial encounter ([facility]) [T84.018A, Z96.659]  Procedure(s) (LRB):  REVISION OF LEFT TOTAL KNEE REPLACEMENT (Left) Day of Surgery   Precautions: Fall Risk, Weight Bearing, Surgical Protocols,  , Left Lower Extremity Weight Bearing: Weight Bearing As Tolerated,  ,  ,  ,  ,    PLOF: Independent     ASSESSMENT :  Introduced self to pt and family, ed on role of PT and pt agreeable to PT evaluation.  Based on the objective data described below, the patient presents with decreased mobility in regards to bed mobility, transfers, gait quality, gait tolerance, balance, stair negotiation and safety due to L TKA rev surgery.  Decreased AROM of L knee, dec strength L knee, pain in L knee, decreased sensation of L knee, also impacting functional mobility.  Using numerical pain scale, pt rated pain 6/10 pre/during/post session.  Pt and caregiver ed regarding mobility safety, WB, HEP, ice application/use, elevation, environmental safety and home safe  Patient;Caregiver  Education Provided: Role of Therapy;Plan of Care;Transfer Training;Fall Prevention Strategies  Education Method: Demonstration;Verbal;Teach Back  Education Outcome: Verbalized understanding;Demonstrated understanding;Continued education needed    Thank you for this referral.  Joyce Vizcaino PT  Minutes: 15      Eval Complexity: Decision Making: Low Complexity

## 2024-10-01 NOTE — DISCHARGE SUMMARY
[facility] [address] [address]     DISCHARGE SUMMARY     PATIENT: Chepe Ramírez     MRN: [id number]   ADMIT DATE: 10/1/2024   BILLIN   DISCHARGE DATE: 10/1/2024     ATTENDING: Flavio Anderson MD   DICTATING: HAILE Neves         ADMISSION DIAGNOSIS: Pain due to total knee replacement, initial encounter ([facility]) [T84.84XA, Z96.659]  Failed total left knee replacement, initial encounter ([facility]) [T84.093A]  Failed total knee arthroplasty, initial encounter ([facility]) [T84.018A, Z96.659]    DISCHARGE DIAGNOSIS: Status post REVISION LEFT TOTAL KNEE ARTHROPLASTY    HISTORY OF PRESENT ILLNESS: The patient is a 79 y.o. 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 The patient's pain has persisted and progressed despite conservative treatments and therapies. The patient has at this time opted for surgical intervention.    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    Sore throat 2024    pt wife has covid/wearing mask so far negative; will test again tomorrow and notify MD and PAT    Wears dentures     no adhesive used    Wears glasses        PAST SURGICAL HISTORY:   Past Surgical History:   Procedure Laterality Date    CARPAL TUNNEL RELEASE      COLONOSCOPY N/A 2019

## 2024-10-01 NOTE — PERIOP NOTE
TRANSFER - IN REPORT:    Verbal report received from Piper darby on Chepe Ramírez  being received from pacu for routine post-op      Report consisted of patient's Situation, Background, Assessment and   Recommendations(SBAR).     Information from the following report(s) Nurse Handoff Report was reviewed with the receiving nurse.    Opportunity for questions and clarification was provided.      Assessment completed upon patient's arrival to unit and care assumed.

## 2024-10-01 NOTE — ANESTHESIA PROCEDURE NOTES
Peripheral Block    Patient location during procedure: pre-op  Reason for block: post-op pain management and at surgeon's request  Start time: 10/1/2024 8:22 AM  End time: 10/1/2024 8:26 AM  Staffing  Performed: anesthesiologist   Anesthesiologist: Jerry Salinas MD  Performed by: Jerry Salinas MD  Authorized by: Jerry Salinas MD    Preanesthetic Checklist  Completed: patient identified, IV checked, site marked, risks and benefits discussed, surgical/procedural consents, equipment checked, pre-op evaluation, timeout performed, anesthesia consent given, oxygen available and monitors applied/VS acknowledged  Peripheral Block   Patient position: supine  Prep: ChloraPrep  Provider prep: mask, sterile gloves and sterile gown  Patient monitoring: continuous pulse ox, cardiac monitor, IV access, oxygen, responsive to questions and frequent blood pressure checks  Block type: Saphenous  Laterality: left  Injection technique: single-shot  Guidance: ultrasound guided    Needle   Needle type: insulated echogenic nerve stimulator needle   Needle gauge: 21 G  Needle localization: anatomical landmarks and ultrasound guidance  Assessment   Injection assessment: negative aspiration for heme, local visualized surrounding nerve on ultrasound and no intravascular symptoms  Paresthesia pain: none  Slow fractionated injection: yes  Hemodynamics: stable  Outcomes: uncomplicated and patient tolerated procedure well    Medications Administered  midazolam (VERSED) injection 2 mg/2mL - IntraVENous   2 mg - 10/1/2024 8:22:00 AM  dexmedeTOMIDine (PRECEDEX) injection 200 mcg/2 mL - Perineural   0.2 mL - 10/1/2024 8:24:00 AM  ropivacaine (NAROPIN) injection 0.5% - Perineural   20 mL - 10/1/2024 8:24:00 AM

## 2024-10-02 NOTE — OP NOTE
the patient was brought into the operating suite, he was effectively anesthetized using spinal anesthetic.  He had been anesthetized in the preop holding with an adductor canal block.  His left lower extremity was prepped and draped in normal sterile orthopedic fashion.  He was given appropriate antibiotic IV preoperatively.  After proper time-out and site identification, his previous skin incision was incised through skin and subcutaneous tissue.  Medial arthrotomy was performed.  There was a tremendous amount of blood-tinged synovial fluid noted.  After adequate exposure was obtained, the components were inspected.  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  There was a lot of scarring in the lateral gutter, presumably from the lateral femoral overhang, which was noted and again, there was a piece of cement that was adherent to the lateral femoral condyle that actually was on the lateral aspect of the trochlea.  There was also some radial osteolysis underneath the anterior aspect of the tibial base plate.  At this point, the patella was resected using the oscillating saw to a nice level cut and was sized to a 35 mm 3-peg patella.  This was drilled through the drill guide and the patella protector button was placed.  The patella was then slid into the lateral gutter and not everted throughout the case.  The polyethylene spacer was removed with an osteotome.  An extensive synovectomy was carried out.  At this point, it was noted again that the femur was overhanging a fair amount laterally.  We felt that we would have to at least change the femoral component.  Femoral component was then removed with osteotomes with the very little bone loss.  Attention was then turned to the tibial base plate.  Again, there was some resorption apparently where the base plate had lifted off anteriorly, but clearly there was an area that we could pass the osteotomes anteriorly between the bone-cement interface.  It was decided that we would go  ahead and remove the tibial component as well and rebalance the knee and patella tracking, ie baja..  It was also felt that since he had existing patella baja, if we could bring the tibia and augment the femur distally, we could lower the joint line and perhaps improve his patella baja.  The tibial component was then removed using oscillating saw and osteotomes with little bone loss.  At this point, the tibial and femoral canals were reamed for a press-fit stems of tibia with 14 x 100 and the femur of 18 x 100.  The tibia was sized to a size 5 base plate with good coverage without overhang.  The keel punch was performed and the trial component was then placed using the stem.  Once again, good fit was noted with no overhang.  The femoral component was then prepared using the IM miguel ángel or fluted stem and recut taking just a very small cut on the distal femur, but then supplementing this with +5 augments to bring the femur distally.  The trial femoral component was then placed and trial reduction was performed with the 5 x 16 posterior stabilized component.  It should be noted the box was cut for the posterior stabilized component as well.  With a 5 x 16 polyethylene insert, he appeared to have full extension, full flexion, excellent stability to varus and valgus stress testing throughout the range of motion, and excellent tracking of the patella using no-touch technique.  At this point, the trials were removed.  The bone ends were irrigated copiously with pulse lavage system and dried.  The meniscal beds and periosteum were injected with local anesthetic and the components were then cemented in place, placing cement just around the base plate of the tibia and the femoral component itself and not down the canal using the press-fit stem.  Excellent stable fixation was obtained.  The polyethylene spacer was placed.  The knee was held in full extension while cement hardened.  Simultaneously, the patella was cemented in place

## 2024-10-25 ENCOUNTER — HOSPITAL ENCOUNTER (OUTPATIENT)
Facility: HOSPITAL | Age: 79
Setting detail: RECURRING SERIES
Discharge: HOME | End: 2024-10-28
Payer: MEDICARE

## 2024-10-25 PROCEDURE — 97530 THERAPEUTIC ACTIVITIES: CPT

## 2024-10-25 PROCEDURE — 97162 PT EVAL MOD COMPLEX 30 MIN: CPT

## 2024-10-25 PROCEDURE — 97535 SELF CARE MNGMENT TRAINING: CPT

## 2024-10-25 NOTE — PROGRESS NOTES
PHYSICAL / OCCUPATIONAL THERAPY - DAILY TREATMENT NOTE (updated )  For Eval visit    Patient Name: Chepe Ramírez    Date: 10/25/2024    : 1945  Insurance: Payor: MEDICARE / Plan: MEDICARE PART A AND B / Product Type: *No Product type* /      Patient  verified yes     Visit #   Current / Total 1 16   Time   In / Out 2:18 pm 2:50 pm   Pain   In / Out 5 5   Subjective Functional Status/Changes: See POC     TREATMENT AREA =  see POC    OBJECTIVE      9 min   Eval - untimed                      Therapeutic Procedures:    Tx Min Billable or 1:1 Min (if diff from Tx Min) Procedure, Rationale, Specifics         8  95205 Therapeutic Activity (timed):  use of dynamic activities replicating functional movements to increase ROM, strength, coordination, balance, and proprioception in order to improve patient's ability to progress to PLOF and address remaining functional goals.  (see flow sheet as applicable)     Details if applicable:  HEP program          15  34268 Self Care/Home Management (timed):  improve patient knowledge and understanding of pain reducing techniques, positioning, activity modification, diagnosis/prognosis, and physical therapy expectations, procedures and progression  to improve patient's ability to progress to PLOF and address remaining functional goals.  (see flow sheet as applicable)     Details if applicable:            Details if applicable:            Details if applicable:     23  [facility] Totals Reminder: bill using total billable min of TIMED therapeutic procedures (example: do not include dry needle or estim unattended, both untimed codes, in totals to left)  8-22 min = 1 unit; 23-37 min = 2 units; 38-52 min = 3 units; 53-67 min = 4 units; 68-82 min = 5 units   Total Total     [x]  Patient Education billed concurrently with other procedures   [x] Review HEP    [] Progressed/Changed HEP, detail:    [] Other detail:       Objective Information/Functional Measures/Assessment    See

## 2024-10-25 NOTE — THERAPY EVALUATION
standing for 1 hr to cook a meal or do other household chores.  EVAL: 7/10 pain  2.  Improve LEFS Score by 15 points in order to show significant functional improvement.  EVAL: 44 points  3.  Improve L hip flexion and abduction strength to at least 4/5 and L knee flexion and extension strength to 4+/5 MMT to allow patient to be able to reciprocally climb stairs using 1 railing.  EVAL:  see assessment    Frequency / Duration:   Patient to be seen  1-2 times per week for 6-8  weeks:      Patient/ Caregiver education and instruction: Diagnosis, prognosis, self care, activity modification, and exercises [x]  Plan of care has been reviewed with PTA    Certification Period: 10/25/24 - 12/25/24    Eufemia Balderrama, PT       10/25/2024       2:18 PM    Payor: MEDICARE / Plan: MEDICARE PART A AND B / Product Type: *No Product type* /     Physician signature required for Medicare, Medicaid, Worker's Comp, Direct Access   ===================================================================  I certify that the above Therapy Services are being furnished while the patient is under my care. I agree with the treatment plan and certify that this therapy is necessary.    Physician's Signature:_________________________   DATE:_________   TIME:________                           Justina Ballard MD    ** Signature, Date and Time must be completed for valid certification **  Please sign and fax to [facility]. Thank you

## 2024-10-28 ENCOUNTER — HOSPITAL ENCOUNTER (OUTPATIENT)
Facility: HOSPITAL | Age: 79
Setting detail: RECURRING SERIES
Discharge: HOME | End: 2024-10-31
Payer: MEDICARE

## 2024-10-28 PROCEDURE — 97140 MANUAL THERAPY 1/> REGIONS: CPT

## 2024-10-28 PROCEDURE — 97110 THERAPEUTIC EXERCISES: CPT

## 2024-10-28 PROCEDURE — 97530 THERAPEUTIC ACTIVITIES: CPT

## 2024-10-28 NOTE — PROGRESS NOTES
PHYSICAL / OCCUPATIONAL THERAPY - DAILY TREATMENT NOTE    Patient Name: Chepe Ramírez    Date: 10/28/2024    : 1945  Insurance: Payor: MEDICARE / Plan: MEDICARE PART A AND B / Product Type: *No Product type* /      Patient  verified Yes     Visit #   Current / Total 2 16   Time   In / Out 930 1018   Pain   In / Out 5 5 sore from the ice   Subjective Functional Status/Changes: Sore. I stiffen up after I do the exercises.     TREATMENT AREA =  Pain in left knee [M25.562]     OBJECTIVE    Modalities Rationale:     decrease edema, decrease pain, and increase tissue extensibility to improve patient's ability to progress to PLOF and address remaining functional goals.     min [] Estim Unattended, type/location:                                      []  w/ice    []  w/heat    min [] Estim Attended, type/location:                                     []  w/US     []  w/ice    []  w/heat    []  TENS insruct      min []  Mechanical Traction: type/lbs                   []  pro   []  sup   []  int   []  cont    []  before manual    []  after manual    min []  Ultrasound, settings/location:     10 min  unbill [x]  Ice    post (8 minutes) [x]  Heat  post (10 minutes)   location/position:ice to left knee     min []  Paraffin,  details:     min []  Vasopneumatic Device, press/temp:     min []  Whirlpool / Fluido:    If using vaso (only need to measure limb vaso being performed on)      pre-treatment girth :       post-treatment girth :       measured at (landmark location) :      min []  Other:    Skin assessment post-treatment:   Intact      Therapeutic Procedures:    Tx Min Billable or 1:1 Min (if diff from Tx Min) Procedure, Rationale, Specifics    47196 Therapeutic Exercise (timed):  increase ROM, strength, coordination, balance, and proprioception to improve patient's ability to progress to PLOF and address remaining functional goals. (see flow sheet as applicable)     Details if applicable:        28168

## 2024-10-30 ENCOUNTER — HOSPITAL ENCOUNTER (OUTPATIENT)
Facility: HOSPITAL | Age: 79
Setting detail: RECURRING SERIES
Discharge: HOME | End: 2024-11-02
Payer: MEDICARE

## 2024-10-30 PROCEDURE — 97140 MANUAL THERAPY 1/> REGIONS: CPT

## 2024-10-30 PROCEDURE — 97530 THERAPEUTIC ACTIVITIES: CPT

## 2024-10-30 NOTE — PROGRESS NOTES
PHYSICAL / OCCUPATIONAL THERAPY - DAILY TREATMENT NOTE    Patient Name: Chepe Ramírez    Date: 10/30/2024    : 1945  Insurance: Payor: MEDICARE / Plan: MEDICARE PART A AND B / Product Type: *No Product type* /      Patient  verified Yes     Visit #   Current / Total 3 16   Time   In / Out 10:10 10:55   Pain   In / Out 4 <4   Subjective Functional Status/Changes: \"My knee is little sore.\"     TREATMENT AREA =  Pain in left knee [M25.562]     OBJECTIVE    Modalities Rationale:     decrease pain and increase tissue extensibility to improve patient's ability to progress to PLOF and address remaining functional goals.     min [] Estim Unattended, type/location:                                      []  w/ice    []  w/heat    min [] Estim Attended, type/location:                                     []  w/US     []  w/ice    []  w/heat    []  TENS insruct      min []  Mechanical Traction: type/lbs                   []  pro   []  sup   []  int   []  cont    []  before manual    []  after manual    min []  Ultrasound, settings/location:     10 min  unbill [x]  Ice  left patella    [x]  Heat    left HS location/position: Semi reclined      min []  Paraffin,  details:     min []  Vasopneumatic Device, press/temp:     min []  Whirlpool / Fluido:    If using vaso (only need to measure limb vaso being performed on)      pre-treatment girth :       post-treatment girth :       measured at (landmark location) :      min []  Other:    Skin assessment post-treatment:   Intact      Therapeutic Procedures:    Tx Min Billable or 1:1 Min (if diff from Tx Min) Procedure, Rationale, Specifics   46 [id number] Therapeutic Activity (timed):  use of dynamic activities replicating functional movements to increase ROM, strength, coordination, balance, and proprioception in order to improve patient's ability to progress to PLOF and address remaining functional goals.  (see flow sheet as applicable)     Details if applicable:       9 [phone]

## 2024-11-05 ENCOUNTER — HOSPITAL ENCOUNTER (OUTPATIENT)
Facility: HOSPITAL | Age: 79
Setting detail: RECURRING SERIES
Discharge: HOME | End: 2024-11-08
Payer: MEDICARE

## 2024-11-05 PROCEDURE — 97530 THERAPEUTIC ACTIVITIES: CPT

## 2024-11-05 PROCEDURE — 97140 MANUAL THERAPY 1/> REGIONS: CPT

## 2024-11-05 PROCEDURE — 97110 THERAPEUTIC EXERCISES: CPT

## 2024-11-05 NOTE — PROGRESS NOTES
[facility]   11/7/2024  2:10 PM Berger, Elizabeth, PT [facility]   11/11/2024 10:10 AM Berger, Elizabeth, PT [facility]   11/13/2024 10:10 AM Berger, Elizabeth, PT [facility]   11/19/2024 11:30 AM Berger, Elizabeth, PT [facility]   11/21/2024 10:10 AM Berger, Elizabeth, PT [facility]   11/25/2024 10:10 AM Berger, Elizabeth, PT [facility]   11/27/2024  1:30 PM Berger, Elizabeth, PT [facility]   2/5/2025  8:00 AM Justina Ballard MD [facility] DEP   8/25/2025  8:00 AM Aguilar Whiting MD [facility] AMB

## 2024-11-07 ENCOUNTER — HOSPITAL ENCOUNTER (OUTPATIENT)
Facility: HOSPITAL | Age: 79
Setting detail: RECURRING SERIES
Discharge: HOME | End: 2024-11-10
Payer: MEDICARE

## 2024-11-07 PROCEDURE — 97110 THERAPEUTIC EXERCISES: CPT

## 2024-11-07 PROCEDURE — 97530 THERAPEUTIC ACTIVITIES: CPT

## 2024-11-07 PROCEDURE — 97140 MANUAL THERAPY 1/> REGIONS: CPT

## 2024-11-07 NOTE — PROGRESS NOTES
PHYSICAL / OCCUPATIONAL THERAPY - DAILY TREATMENT NOTE    Patient Name: Chepe Ramírez    Date: 2024    : 1945  Insurance: Payor: MEDICARE / Plan: MEDICARE PART A AND B / Product Type: *No Product type* /      Patient  verified Yes     Visit #   Current / Total 5 16   Time   In / Out 206 256   Pain   In / Out 5 3   Subjective Functional Status/Changes: I was in miserable pain last night, still not able to sleep well. I haven't done anything today.  I did take motrin and tylenol today.      TREATMENT AREA =  Pain in left knee [M25.562]     OBJECTIVE    Modalities Rationale:     decrease edema, decrease pain, increase tissue extensibility, and post exercise  to improve patient's ability to progress to PLOF and address remaining functional goals.     min [] Estim Unattended, type/location:                                      []  w/ice    []  w/heat    min [] Estim Attended, type/location:                                     []  w/US     []  w/ice    []  w/heat    []  TENS insruct      min []  Mechanical Traction: type/lbs                   []  pro   []  sup   []  int   []  cont    []  before manual    []  after manual    min []  Ultrasound, settings/location:     10 min  unbill [x]  Ice    post [x]  Heat    post location/position:reclined CP anterior and MH posterior     min []  Paraffin,  details:     min []  Vasopneumatic Device, press/temp:     min []  Whirlpool / Fluido:    If using vaso (only need to measure limb vaso being performed on)      pre-treatment girth :       post-treatment girth :       measured at (landmark location) :      min []  Other:    Skin assessment post-treatment:   Redness, no adverse reactions      Therapeutic Procedures:    Tx Min Billable or 1:1 Min (if diff from Tx Min) Procedure, Rationale, Specifics   17 17 [id number] Therapeutic Exercise (timed):  increase ROM, strength, coordination, balance, and proprioception to improve patient's ability to progress to PLOF and address

## 2024-11-11 ENCOUNTER — HOSPITAL ENCOUNTER (OUTPATIENT)
Facility: HOSPITAL | Age: 79
Setting detail: RECURRING SERIES
Discharge: HOME | End: 2024-11-14
Payer: MEDICARE

## 2024-11-11 PROCEDURE — 97530 THERAPEUTIC ACTIVITIES: CPT

## 2024-11-11 PROCEDURE — 97110 THERAPEUTIC EXERCISES: CPT

## 2024-11-11 PROCEDURE — 97140 MANUAL THERAPY 1/> REGIONS: CPT

## 2024-11-11 NOTE — PROGRESS NOTES
PHYSICAL / OCCUPATIONAL THERAPY - DAILY TREATMENT NOTE    Patient Name: Chepe Ramírez    Date: 2024    : 1945  Insurance: Payor: MEDICARE / Plan: MEDICARE PART A AND B / Product Type: *No Product type* /      Patient  verified Yes     Visit #   Current / Total 6 16   Time   In / Out 1015 1103   Pain   In / Out 4 2-3   Subjective Functional Status/Changes: I think I am feeling a little better today than I was on Friday     TREATMENT AREA =  Pain in left knee [M25.562]     OBJECTIVE    Modalities Rationale:     decrease inflammation to improve patient's ability to progress to PLOF and address remaining functional goals.     min [] Estim Unattended, type/location:                                      []  w/ice    []  w/heat    min [] Estim Attended, type/location:                                     []  w/US     []  w/ice    []  w/heat    []  TENS insruct      min []  Mechanical Traction: type/lbs                   []  pro   []  sup   []  int   []  cont    []  before manual    []  after manual    min []  Ultrasound, settings/location:     10 min  unbill [x]  Ice post    [x]  Heat    post location/position:MH posterior and CP anterior left knee, reclined     min []  Paraffin,  details:     min []  Vasopneumatic Device, press/temp:     min []  Whirlpool / Fluido:    If using vaso (only need to measure limb vaso being performed on)      pre-treatment girth :       post-treatment girth :       measured at (landmark location) :      min []  Other:    Skin assessment post-treatment:   Intact      Therapeutic Procedures:    Tx Min Billable or 1:1 Min (if diff from Tx Min) Procedure, Rationale, Specifics   15 15 [id number] Therapeutic Exercise (timed):  increase ROM, strength, coordination, balance, and proprioception to improve patient's ability to progress to PLOF and address remaining functional goals. (see flow sheet as applicable)     Details if applicable:       10 10 [id number] Therapeutic Activity (timed):  use of

## 2024-11-13 ENCOUNTER — HOSPITAL ENCOUNTER (OUTPATIENT)
Facility: HOSPITAL | Age: 79
Setting detail: RECURRING SERIES
Discharge: HOME | End: 2024-11-16
Payer: MEDICARE

## 2024-11-13 PROCEDURE — 97140 MANUAL THERAPY 1/> REGIONS: CPT

## 2024-11-13 PROCEDURE — 97110 THERAPEUTIC EXERCISES: CPT

## 2024-11-13 PROCEDURE — 97530 THERAPEUTIC ACTIVITIES: CPT

## 2024-11-13 PROCEDURE — G0283 ELEC STIM OTHER THAN WOUND: HCPCS

## 2024-11-13 NOTE — PROGRESS NOTES
PHYSICAL / OCCUPATIONAL THERAPY - DAILY TREATMENT NOTE    Patient Name: Chepe Ramírez    Date: 2024    : 1945  Insurance: Payor: MEDICARE / Plan: MEDICARE PART A AND B / Product Type: *No Product type* /      Patient  verified Yes     Visit #   Current / Total 7 16   Time   In / Out 1010 1110   Pain   In / Out 5 <5 reports feeling better post premod   Subjective Functional Status/Changes: It is warmer than the right knee and it was hurting again pretty bad last night and woke me up about 1:30. I had to take the pain medication.     TREATMENT AREA =  Pain in left knee [M25.562]     OBJECTIVE    Modalities Rationale:     decrease edema, decrease pain, increase tissue extensibility, and post exercise  to improve patient's ability to progress to PLOF and address remaining functional goals.    10 min [x] Estim Unattended, type/location: Left knee , reclined Premod  2 channel                                    [x]  w/ice    []  w/heat    min [] Estim Attended, type/location:                                     []  w/US     []  w/ice    []  w/heat    []  TENS insruct      min []  Mechanical Traction: type/lbs                   []  pro   []  sup   []  int   []  cont    []  before manual    []  after manual    min []  Ultrasound, settings/location:      min  unbill []  Ice     []  Heat    location/position:     min []  Paraffin,  details:     min []  Vasopneumatic Device, press/temp:     min []  Whirlpool / Fluido:    If using vaso (only need to measure limb vaso being performed on)      pre-treatment girth :       post-treatment girth :       measured at (landmark location) :      min []  Other:    Skin assessment post-treatment:   Intact      Therapeutic Procedures:    Tx Min Billable or 1:1 Min (if diff from Tx Min) Procedure, Rationale, Specifics   28 27 [id number] Therapeutic Exercise (timed):  increase ROM, strength, coordination, balance, and proprioception to improve patient's ability to progress to PLOF

## 2024-11-19 ENCOUNTER — HOSPITAL ENCOUNTER (OUTPATIENT)
Facility: HOSPITAL | Age: 79
Setting detail: RECURRING SERIES
Discharge: HOME | End: 2024-11-22
Payer: MEDICARE

## 2024-11-19 PROCEDURE — 97535 SELF CARE MNGMENT TRAINING: CPT

## 2024-11-19 PROCEDURE — 97530 THERAPEUTIC ACTIVITIES: CPT

## 2024-11-19 PROCEDURE — 97110 THERAPEUTIC EXERCISES: CPT

## 2024-11-19 NOTE — PROGRESS NOTES
Justina OLSEN MD [facility]   8/25/2025  8:00 AM Aguilar Whiting MD [facility] BS AMB

## 2024-11-21 ENCOUNTER — HOSPITAL ENCOUNTER (OUTPATIENT)
Facility: HOSPITAL | Age: 79
Setting detail: RECURRING SERIES
Discharge: HOME | End: 2024-11-24
Payer: MEDICARE

## 2024-11-21 PROCEDURE — 97110 THERAPEUTIC EXERCISES: CPT

## 2024-11-21 PROCEDURE — 97530 THERAPEUTIC ACTIVITIES: CPT

## 2024-11-21 PROCEDURE — 97140 MANUAL THERAPY 1/> REGIONS: CPT

## 2024-11-21 PROCEDURE — G0283 ELEC STIM OTHER THAN WOUND: HCPCS

## 2024-11-21 NOTE — PROGRESS NOTES
PHYSICAL / OCCUPATIONAL THERAPY - DAILY TREATMENT NOTE    Patient Name: Chepe Ramírez    Date: 2024    : 1945  Insurance: Payor: MEDICARE / Plan: MEDICARE PART A AND B / Product Type: *No Product type* /      Patient  verified Yes     Visit #   Current / Total 9 16   Time   In / Out 1010 1105   Pain   In / Out 3.5 3   Subjective Functional Status/Changes: Doing better, I was able to sleep more last night, I woke up at 4 instead of 2.     TREATMENT AREA =  Pain in left knee [M25.562]     OBJECTIVE    Modalities Rationale:     decrease inflammation, decrease pain, and increase tissue extensibility to improve patient's ability to progress to PLOF and address remaining functional goals.    15 min [x] Estim Unattended, type/location:  IFC left knee with ext stretch                                    [x]  w/ice  anterior  [x]  w/heat posterior    min [] Estim Attended, type/location:                                     []  w/US     []  w/ice    []  w/heat    []  TENS insruct      min []  Mechanical Traction: type/lbs                   []  pro   []  sup   []  int   []  cont    []  before manual    []  after manual    min []  Ultrasound, settings/location:      min  unbill []  Ice     []  Heat    location/position:     min []  Paraffin,  details:     min []  Vasopneumatic Device, press/temp:     min []  Whirlpool / Fluido:    If using vaso (only need to measure limb vaso being performed on)      pre-treatment girth :       post-treatment girth :       measured at (landmark location) :      min []  Other:    Skin assessment post-treatment:   Intact      Therapeutic Procedures:    Tx Min Billable or 1:1 Min (if diff from Tx Min) Procedure, Rationale, Specifics   19 19 [id number] Therapeutic Exercise (timed):  increase ROM, strength, coordination, balance, and proprioception to improve patient's ability to progress to PLOF and address remaining functional goals. (see flow sheet as applicable)     Details if

## 2024-11-25 ENCOUNTER — HOSPITAL ENCOUNTER (OUTPATIENT)
Facility: HOSPITAL | Age: 79
Setting detail: RECURRING SERIES
Discharge: HOME | End: 2024-11-28
Payer: MEDICARE

## 2024-11-25 PROCEDURE — 97110 THERAPEUTIC EXERCISES: CPT

## 2024-11-25 PROCEDURE — 97140 MANUAL THERAPY 1/> REGIONS: CPT

## 2024-11-25 PROCEDURE — 97530 THERAPEUTIC ACTIVITIES: CPT

## 2024-11-25 PROCEDURE — G0283 ELEC STIM OTHER THAN WOUND: HCPCS

## 2024-11-25 NOTE — PROGRESS NOTES
.     Future Appointments   Date Time Provider Department Center   11/25/2024 10:10 [facility]   11/27/2024  1:30 PM Elizabeth Berger PT [facility]   2/5/2025  8:00 AM Justina Ballard MD [facility]   8/25/2025  8:00 AM Aguilar Whiting MD [facility] BS AMB

## 2024-11-25 NOTE — THERAPY RECERTIFICATION
[facility]  [address], [address], [place] [address]  Ph:[phone] Fx:[phone]  PHYSICAL THERAPY PROGRESS NOTE  Patient Name: Chepe Ramírez : 1945   Treatment/Medical Diagnosis: Pain in left knee [M25.562]   Referral Source: Justina Ballard MD     Date of Initial Visit: 10/25/24 Attended Visits: 10 Missed Visits: 0     SUMMARY OF TREATMENT  Patient seen for eval and 9 follow up sessions with improved sleep tolerance and overall pain however now reports CCO clicking in the left knee affecting his gait pattern. He has progressed well with exercises, mild TKE lag of 5 degrees. Encouraging hamstring stretching with is HEP.     CURRENT STATUS      Short Term Goals: To be accomplished in  3-4weeks:      1. Independent with HEP.  EVAL: N/A  CURRENT Reports compliance 24  2. Decrease max pain to <2/10 to assist with being able to walk around grocery store without minimal knee pain.  EVAL: 7/10 max pain  CURRENT  max pain  5-6 and  pain at arrival  3  24  3. Improve L knee AROM to 0-115 degs to allow for functional knee ROM to climb stairs reciprocally.  EVAL: L knee AROM = 0 - 80 degs   CURRENT F 110     E -5   24     Long Term Goals: To be accomplished in  6-8  weeks:  1.  Decrease max pain 0/10 to assist with patient being able to walk community distances and tolerate standing for 1 hr to cook a meal or do other household chores.  EVAL: 7/10 pain  CURRENT pain at arrival  3, max pain 5-6 24  2.  Improve LEFS Score by 15 points in order to show significant functional improvement.  EVAL: 44 points  CURRENT 56 24    3.  Improve L hip flexion and abduction strength to at least 4/5 and L knee flexion and extension strength to 4+/5 MMT to allow patient to be able to reciprocally climb stairs using 1 railing.  EVAL:  see assessment   CURRENT L hip F  5  and L knee F   5    L knee E   5    24       Functional Gains: Less

## 2024-11-27 ENCOUNTER — HOSPITAL ENCOUNTER (OUTPATIENT)
Facility: HOSPITAL | Age: 79
Setting detail: RECURRING SERIES
Discharge: HOME | End: 2024-11-30
Payer: MEDICARE

## 2024-11-27 PROCEDURE — 97530 THERAPEUTIC ACTIVITIES: CPT

## 2024-11-27 PROCEDURE — 97140 MANUAL THERAPY 1/> REGIONS: CPT

## 2024-11-27 PROCEDURE — G0283 ELEC STIM OTHER THAN WOUND: HCPCS

## 2024-11-27 PROCEDURE — 97110 THERAPEUTIC EXERCISES: CPT

## 2024-11-27 NOTE — PROGRESS NOTES
represent the material reviewed with the patient via the .     Future Appointments   Date Time Provider Department Center   2/5/2025  8:00 AM Justina Ballard MD [facility]   8/25/2025  8:00 AM Aguilar Whiting MD [facility] BS AMB

## 2024-12-03 ENCOUNTER — HOSPITAL ENCOUNTER (OUTPATIENT)
Facility: HOSPITAL | Age: 79
Setting detail: RECURRING SERIES
Discharge: HOME | End: 2024-12-06
Payer: MEDICARE

## 2024-12-03 PROCEDURE — 97110 THERAPEUTIC EXERCISES: CPT

## 2024-12-03 PROCEDURE — 97530 THERAPEUTIC ACTIVITIES: CPT

## 2024-12-03 PROCEDURE — 97140 MANUAL THERAPY 1/> REGIONS: CPT

## 2024-12-03 NOTE — PROGRESS NOTES
PHYSICAL / OCCUPATIONAL THERAPY - DAILY TREATMENT NOTE    Patient Name: Chepe Ramírez    Date: 12/3/2024    : 1945  Insurance: Payor: MEDICARE / Plan: MEDICARE PART A AND B / Product Type: *No Product type* /      Patient  verified Yes     Visit #   Current / Total 12 16   Time   In / Out 330 420   Pain   In / Out 3 3   Subjective Functional Status/Changes: Patient complains of Left knee pain and stiffness in Left knee today      TREATMENT AREA =  Pain in left knee [M25.562]     OBJECTIVE    Modalities Rationale:     decrease inflammation, decrease pain, and increase tissue extensibility to improve patient's ability to progress to PLOF and address remaining functional goals.     min [] Estim Unattended, type/location:                                      []  w/ice    []  w/heat    min [] Estim Attended, type/location:                                     []  w/US     []  w/ice    []  w/heat    []  TENS insruct      min []  Mechanical Traction: type/lbs                   []  pro   []  sup   []  int   []  cont    []  before manual    []  after manual    min []  Ultrasound, settings/location:     10 min  unbill [x]  Ice     [x]  Heat    location/position: Reclined CP anterior, MHP posterior     min []  Paraffin,  details:     min []  Vasopneumatic Device, press/temp:     min []  Whirlpool / Fluido:    If using vaso (only need to measure limb vaso being performed on)      pre-treatment girth :       post-treatment girth :       measured at (landmark location) :      min []  Other:    Skin assessment post-treatment:   Intact      Therapeutic Procedures:    Tx Min Billable or 1:1 Min (if diff from Tx Min) Procedure, Rationale, Specifics     27275 Therapeutic Exercise (timed):  increase ROM, strength, coordination, balance, and proprioception to improve patient's ability to progress to PLOF and address remaining functional goals. (see flow sheet as applicable)     Details if applicable:       10  [id number]

## 2024-12-06 ENCOUNTER — HOSPITAL ENCOUNTER (OUTPATIENT)
Facility: HOSPITAL | Age: 79
Setting detail: RECURRING SERIES
Discharge: HOME | End: 2024-12-09
Payer: MEDICARE

## 2024-12-06 PROCEDURE — 97140 MANUAL THERAPY 1/> REGIONS: CPT

## 2024-12-06 PROCEDURE — 97110 THERAPEUTIC EXERCISES: CPT

## 2024-12-06 PROCEDURE — 97530 THERAPEUTIC ACTIVITIES: CPT

## 2024-12-10 ENCOUNTER — HOSPITAL ENCOUNTER (OUTPATIENT)
Facility: HOSPITAL | Age: 79
Setting detail: RECURRING SERIES
Discharge: HOME | End: 2024-12-13
Payer: MEDICARE

## 2024-12-10 PROCEDURE — 97110 THERAPEUTIC EXERCISES: CPT

## 2024-12-10 PROCEDURE — 97530 THERAPEUTIC ACTIVITIES: CPT

## 2024-12-10 PROCEDURE — 97140 MANUAL THERAPY 1/> REGIONS: CPT

## 2024-12-10 NOTE — PROGRESS NOTES
PHYSICAL / OCCUPATIONAL THERAPY - DAILY TREATMENT NOTE    Patient Name: Chepe Ramírez    Date: 12/10/2024    : 1945  Insurance: Payor: MEDICARE / Plan: MEDICARE PART A AND B / Product Type: *No Product type* /      Patient  verified Yes     Visit #   Current / Total 14 16   Time   In / Out 2:50 pm 3:40 pm   Pain   In / Out 5 3   Subjective Functional Status/Changes: \"I was doing the heel slides on  and felt a strong pull/rip feeling at the top of my knee cap and my knee has been very stiff since then.\"     TREATMENT AREA =  Pain in left knee [M25.562]     OBJECTIVE    Modalities Rationale:     decrease edema, decrease inflammation, and decrease pain to improve patient's ability to progress to PLOF and address remaining functional goals.     min [] Estim Unattended, type/location:                                      []  w/ice    []  w/heat    min [] Estim Attended, type/location:                                     []  w/US     []  w/ice    []  w/heat    []  TENS insruct      min []  Mechanical Traction: type/lbs                   []  pro   []  sup   []  int   []  cont    []  before manual    []  after manual    min []  Ultrasound, settings/location:     10 min  unbill [x]  Ice     [x]  Heat    location/position: CP on top and MHP under L knee in seated position    min []  Paraffin,  details:     min []  Vasopneumatic Device, press/temp:     min []  Whirlpool / Fluido:    If using vaso (only need to measure limb vaso being performed on)      pre-treatment girth :       post-treatment girth :       measured at (landmark location) :      min []  Other:    Skin assessment post-treatment:   Intact       Therapeutic Procedures:    Tx Min Billable or 1:1 Min (if diff from Tx Min) Procedure, Rationale, Specifics   18  00368 Therapeutic Exercise (timed):  increase ROM, strength, coordination, balance, and proprioception to improve patient's ability to progress to PLOF and address remaining functional goals.

## 2024-12-12 ENCOUNTER — HOSPITAL ENCOUNTER (OUTPATIENT)
Facility: HOSPITAL | Age: 79
Setting detail: RECURRING SERIES
Discharge: HOME | End: 2024-12-15
Payer: MEDICARE

## 2024-12-12 PROCEDURE — 97530 THERAPEUTIC ACTIVITIES: CPT

## 2024-12-12 PROCEDURE — 97110 THERAPEUTIC EXERCISES: CPT

## 2024-12-12 PROCEDURE — 97140 MANUAL THERAPY 1/> REGIONS: CPT

## 2024-12-12 NOTE — PROGRESS NOTES
analyze and address imbalance/dizziness, and instruct in home and community integration to address functional deficits and attain remaining goals.    Progress toward goals / Updated goals:  []  See Progress Note/Recertification  1. Independent with HEP.  EVAL: N/A  CURRENT Reports compliance 11/27/24  2. Decrease max pain to <2/10 to assist with being able to walk around grocery store without minimal knee pain.  EVAL: 7/10 max pain  CURRENT  max pain  5-6 and  pain at arrival  3  11/25/24            pain at arrival 5 11/27/24  3. Improve L knee AROM to 0-115 degs to allow for functional knee ROM to climb stairs reciprocally.  EVAL: L knee AROM = 0 - 80 degs   CURRENT F 110     E -5   11/25/24        NA 11/27/24   AROM  F   104    E  -4     Long Term Goals: To be accomplished in  6-8  weeks:  1.  Decrease max pain 0/10 to assist with patient being able to walk community distances and tolerate standing for 1 hr to cook a meal or do other household chores.  EVAL: 7/10 pain  CURRENT pain at arrival  3, max pain 5-6 11/25/24    pain at arrival 5 11/27/24  2.  Improve LEFS Score by 15 points in order to show significant functional improvement.  EVAL: 44 points  CURRENT 56 11/25/24      NA 11/27/24  3.  Improve L hip flexion and abduction strength to at least 4/5 and L knee flexion and extension strength to 4+/5 MMT to allow patient to be able to reciprocally climb stairs using 1 railing.  EVAL:  see assessment   CURRENT L hip F  5  and L knee F   5    L knee E   5    11/25/24        NA 11/27/24        Next RC due   12/25/24  Auth due (visit number/ date) RILEY      PLAN  - Continue Plan of Care    Shante Watkins, PTA    12/12/2024    3:02 PM  If an interpreting service was utilized for treatment of this patient, the contents of this document represent the material reviewed with the patient via the .     Future Appointments   Date Time Provider Department Center   12/16/2024 10:10 AM [facility]

## 2024-12-16 ENCOUNTER — HOSPITAL ENCOUNTER (OUTPATIENT)
Facility: HOSPITAL | Age: 79
Setting detail: RECURRING SERIES
Discharge: HOME | End: 2024-12-19
Payer: MEDICARE

## 2024-12-16 PROCEDURE — 97140 MANUAL THERAPY 1/> REGIONS: CPT

## 2024-12-16 PROCEDURE — 97110 THERAPEUTIC EXERCISES: CPT

## 2024-12-16 PROCEDURE — 97530 THERAPEUTIC ACTIVITIES: CPT

## 2024-12-16 NOTE — THERAPY RECERTIFICATION
allow patient to be able to reciprocally climb stairs using 1 railing.  EVAL:  see assessment   CURRENT L hip F 5  abd  5  and L knee F 5    L knee E  5     24          Key Functional Changes/Progress: able to sleep in the bed for 4 days now , I can lay on my L side now.   Problem List: pain affecting function, decrease ROM, decrease strength, impaired gait/balance, decrease ADL/functional abilities, decrease activity tolerance, decrease flexibility/joint mobility, decrease transfer abilities , and other LEFS 54    Treatment Plan may include any combination of the followin Therapeutic Exercise, 40853 Neuromuscular Re-Education, 53317 Manual Therapy, 23819 Therapeutic Activity, 70851 Self Care/Home Management, 58259 Electrical Stim unattended /  (MCR), and 57956 Gait Training  Patient Goal(s) has been updated and includes: further decrease pain.    Goals for this certification period include and are to be achieved in   4  WEEKS    1.  Decrease max pain 2-3/10 to assist with patient being able to walk community distances and tolerate standing for 1 hr to cook a meal or do other household chores.  Recert  pain at arrival  3, max pain 5-6 24   pain at arrival 4 24     2.  Improve LEFS Score to 59  points in order to show significant functional improvement.  recert    54  24    3 patient will ambulated 8\" stairs 1 rail reciprocal and little difficulty   Recert 8\" 3x B rails and moderate difficulty reciprocal     Frequency / Duration:   Patient to be seen   2   times per week for   4    WEEKS    Assessments/Recommendations: Patient demonstrates the potential to make further gains with improving ROM, strength, endurance/activity tolerance, functional LEFS  survey score 54 and all within a reasonable time frame so as to further increase their functional independence with ADLs and activities for carryover to improve quality of life, tolerance to household and community activities and

## 2024-12-16 NOTE — PROGRESS NOTES
imbalance/dizziness, and instruct in home and community integration to address functional deficits and attain remaining goals.    Progress toward goals / Updated goals:  []  See Progress Note/Recertification      Short Term Goals: To be accomplished in  3-4weeks:   1. Independent with HEP.  EVAL: N/A  CURRENT Reports compliance 12/16/24  2. Decrease max pain to <2/10 to assist with being able to walk around grocery store without minimal knee pain.  EVAL: 7/10 max pain  CURRENT  max pain  5-6 and  pain at arrival  3  11/25/24            pain at arrival 4 12/16/24  3. Improve L knee AROM to 0-115 degs to allow for functional knee ROM to climb stairs reciprocally.  EVAL: L knee AROM = 0 - 80 degs   CURRENT  AROM  F   110    E  -4 12/16/24     Long Term Goals: To be accomplished in  6-8  weeks:  1.  Decrease max pain 0/10 to assist with patient being able to walk community distances and tolerate standing for 1 hr to cook a meal or do other household chores.  EVAL: 7/10 pain  CURRENT pain at arrival  3, max pain 5-6 11/25/24    pain at arrival 4 12/16/24    2.  Improve LEFS Score by 15 points in order to show significant functional improvement.  EVAL: 44 points  CURRENT 56 11/25/24      54  12/16/24  3.  Improve L hip flexion and abduction strength to at least 4/5 and L knee flexion and extension strength to 4+/5 MMT to allow patient to be able to reciprocally climb stairs using 1 railing.  EVAL:  see assessment   CURRENT L hip F 5  abd  5  and L knee F 5    L knee E  5     12/16/24        Next RC due   1/17/25  Auth due (visit number/ date) RILEY    PLAN  - Continue Plan of Care  - Upgrade activities as tolerated  - Other : send recert    Elizabeth Berger, PT    12/16/2024    10:24 AM  If an interpreting service was utilized for treatment of this patient, the contents of this document represent the material reviewed with the patient via the .     Future Appointments   Date Time Provider Department Center

## 2024-12-17 RX ORDER — LISINOPRIL 10 MG/1
TABLET ORAL
Qty: 90 TABLET | Refills: 1 | Status: SHIPPED | OUTPATIENT
Start: 2024-12-17

## 2024-12-18 ENCOUNTER — HOSPITAL ENCOUNTER (OUTPATIENT)
Facility: HOSPITAL | Age: 79
Setting detail: RECURRING SERIES
Discharge: HOME | End: 2024-12-21
Payer: MEDICARE

## 2024-12-18 PROCEDURE — 97110 THERAPEUTIC EXERCISES: CPT

## 2024-12-18 PROCEDURE — 97140 MANUAL THERAPY 1/> REGIONS: CPT

## 2024-12-18 PROCEDURE — 97530 THERAPEUTIC ACTIVITIES: CPT

## 2024-12-18 NOTE — PROGRESS NOTES
PHYSICAL / OCCUPATIONAL THERAPY - DAILY TREATMENT NOTE    Patient Name: Chepe Ramírez    Date: 2024    : 1945  Insurance: Payor: MEDICARE / Plan: MEDICARE PART A AND B / Product Type: *No Product type* /      Patient  verified Yes     Visit #   Current / Total 1 8   Time   In / Out 1010 1110   Pain   In / Out 4 4 because you bent it   Subjective Functional Status/Changes: I tried not taking any medication since Monday and I was awake earlier this morning, I just couldn't do it.      TREATMENT AREA =  Pain in left knee [M25.562]     OBJECTIVE    Modalities Rationale:     decrease edema, decrease inflammation, decrease pain, increase tissue extensibility, and post exercise and manual   to improve patient's ability to progress to PLOF and address remaining functional goals.     min [] Estim Unattended, type/location:                                      []  w/ice    []  w/heat    min [] Estim Attended, type/location:                                     []  w/US     []  w/ice    []  w/heat    []  TENS insruct      min []  Mechanical Traction: type/lbs                   []  pro   []  sup   []  int   []  cont    []  before manual    []  after manual    min []  Ultrasound, settings/location:     10 min  unbill [x]  Ice     [x]  Heat    location/position: Post, CP anterior and MH posterior with passive Ext stretch    min []  Paraffin,  details:     min []  Vasopneumatic Device, press/temp:     min []  Whirlpool / Fluido:    If using vaso (only need to measure limb vaso being performed on)      pre-treatment girth :       post-treatment girth :       measured at (landmark location) :      min []  Other:    Skin assessment post-treatment:   Intact      Therapeutic Procedures:    Tx Min Billable or 1:1 Min (if diff from Tx Min) Procedure, Rationale, Specifics   28 17 70616 Therapeutic Exercise (timed):  increase ROM, strength, coordination, balance, and proprioception to improve patient's ability to progress to

## 2024-12-23 ENCOUNTER — HOSPITAL ENCOUNTER (OUTPATIENT)
Facility: HOSPITAL | Age: 79
Setting detail: RECURRING SERIES
Discharge: HOME | End: 2024-12-26
Payer: MEDICARE

## 2024-12-23 PROCEDURE — 97140 MANUAL THERAPY 1/> REGIONS: CPT

## 2024-12-23 PROCEDURE — 97530 THERAPEUTIC ACTIVITIES: CPT

## 2024-12-23 PROCEDURE — 97110 THERAPEUTIC EXERCISES: CPT

## 2024-12-23 NOTE — PROGRESS NOTES
applicable:       12 12 97530 Therapeutic Activity (timed):  use of dynamic activities replicating functional movements to increase ROM, strength, coordination, balance, and proprioception in order to improve patient's ability to progress to PLOF and address remaining functional goals.  (see flow sheet as applicable)     Details if applicable:     13 13 97140 Manual Therapy (timed):  decrease pain, increase ROM, and increase tissue extensibility to improve patient's ability to progress to PLOF and address remaining functional goals.  The manual therapy interventions were performed at a separate and distinct time from the therapeutic activities interventions . (see flow sheet as applicable)     Details if applicable:  scar massage, patellar mobs, ROM F/E with overstretch reclined left knee, CFM distal lateral ITBand           Details if applicable:            Details if applicable:     47 47 [facility] Totals Reminder: bill using total billable min of TIMED therapeutic procedures (example: do not include dry needle or estim unattended, both untimed codes, in totals to left)  8-22 min = 1 unit; 23-37 min = 2 units; 38-52 min = 3 units; 53-67 min = 4 units; 68-82 min = 5 units   Total Total     [x]  Patient Education billed concurrently with other procedures   [x] Review HEP    [] Progressed/Changed HEP, detail:    [] Other detail:       Objective Information/Functional Measures/Assessment    VC exercise and tech  Progressing as able and expected  Increased DL leg press to 90# 2x10 with no difficulty      Patient will continue to benefit from skilled PT / OT services to modify and progress therapeutic interventions, analyze and address functional mobility deficits, analyze and address ROM deficits, analyze and address strength deficits, analyze and address soft tissue restrictions, analyze and cue for proper movement patterns, analyze and modify for postural abnormalities, and instruct in home and community integration to

## 2024-12-27 ENCOUNTER — HOSPITAL ENCOUNTER (OUTPATIENT)
Facility: HOSPITAL | Age: 79
Setting detail: RECURRING SERIES
Discharge: HOME | End: 2024-12-30
Payer: MEDICARE

## 2024-12-27 PROCEDURE — 97530 THERAPEUTIC ACTIVITIES: CPT

## 2024-12-27 PROCEDURE — 97140 MANUAL THERAPY 1/> REGIONS: CPT

## 2024-12-27 PROCEDURE — 97110 THERAPEUTIC EXERCISES: CPT

## 2024-12-27 NOTE — PROGRESS NOTES
Normal results. PHYSICAL / OCCUPATIONAL THERAPY - DAILY TREATMENT NOTE    Patient Name: Chepe Ramírez    Date: 2024    : 1945  Insurance: Payor: MEDICARE / Plan: MEDICARE PART A AND B / Product Type: *No Product type* /      Patient  verified Yes     Visit #   Current / Total 3 8   Time   In / Out 848 940   Pain   In / Out 3 3   Subjective Functional Status/Changes: Reports he was a little sore yesterday after being on his feet most of the day [place]      TREATMENT AREA =  Pain in left knee [M25.562]     OBJECTIVE    Modalities Rationale:     decrease pain and increase tissue extensibility to improve patient's ability to progress to PLOF and address remaining functional goals.     min [] Estim Unattended, type/location:                                      []  w/ice    []  w/heat    min [] Estim Attended, type/location:                                     []  w/US     []  w/ice    []  w/heat    []  TENS insruct      min []  Mechanical Traction: type/lbs                   []  pro   []  sup   []  int   []  cont    []  before manual    []  after manual    min []  Ultrasound, settings/location:     10 min  unbill [x]  Ice  post   [x]  Heat   post location/position:reclined CP anterior and MH posterior with passive extension stretch     min []  Paraffin,  details:     min []  Vasopneumatic Device, press/temp:     min []  Whirlpool / Fluido:    If using vaso (only need to measure limb vaso being performed on)      pre-treatment girth :       post-treatment girth :       measured at (landmark location) :      min []  Other:    Skin assessment post-treatment:   Intact      Therapeutic Procedures:    Tx Min Billable or 1:1 Min (if diff from Tx Min) Procedure, Rationale, Specifics   17 15 86813 Therapeutic Exercise (timed):  increase ROM, strength, coordination, balance, and proprioception to improve patient's ability to progress to PLOF and address remaining functional goals. (see flow sheet as applicable)     Details if

## 2024-12-30 ENCOUNTER — HOSPITAL ENCOUNTER (OUTPATIENT)
Facility: HOSPITAL | Age: 79
Setting detail: RECURRING SERIES
Discharge: HOME | End: 2025-01-02
Payer: MEDICARE

## 2024-12-30 PROCEDURE — 97110 THERAPEUTIC EXERCISES: CPT

## 2024-12-30 PROCEDURE — 97140 MANUAL THERAPY 1/> REGIONS: CPT

## 2024-12-30 PROCEDURE — G0283 ELEC STIM OTHER THAN WOUND: HCPCS

## 2024-12-30 PROCEDURE — 97530 THERAPEUTIC ACTIVITIES: CPT

## 2024-12-30 NOTE — PROGRESS NOTES
(timed):  use of dynamic activities replicating functional movements to increase ROM, strength, coordination, balance, and proprioception in order to improve patient's ability to progress to PLOF and address remaining functional goals.  (see flow sheet as applicable)     Details if applicable:       20 20 97110 Therapeutic Exercise (timed):  increase ROM, strength, coordination, balance, and proprioception to improve patient's ability to progress to PLOF and address remaining functional goals. (see flow sheet as applicable)  Details if applicable:     15 15 03151 Manual Therapy (timed):  decrease pain, increase ROM, and increase tissue extensibility to improve patient's ability to progress to PLOF and address remaining functional goals.  The manual therapy interventions were performed at a separate and distinct time from the therapeutic activities interventions . (see flow sheet as applicable)     Details if applicable:  reclined scar massage, patellar mobs, STM effleurage left thigh, ROM F/E R knee with gentle overstretch          Details if applicable:            Details if applicable:     45 45 [facility] Totals Reminder: bill using total billable min of TIMED therapeutic procedures (example: do not include dry needle or estim unattended, both untimed codes, in totals to left)  8-22 min = 1 unit; 23-37 min = 2 units; 38-52 min = 3 units; 53-67 min = 4 units; 68-82 min = 5 units   Total Total     [x]  Patient Education billed concurrently with other procedures   [x] Review HEP    [] Progressed/Changed HEP, detail:    [] Other detail:       Objective Information/Functional Measures/Assessment    VC exercise and tech  Pt remains frustrated and depressed with continued pain and states he may return for injection R knee and ask about L knee nerve block.    Patient will continue to benefit from skilled PT / OT services to modify and progress therapeutic interventions, analyze and address ROM deficits, analyze and

## 2025-01-02 ENCOUNTER — HOSPITAL ENCOUNTER (OUTPATIENT)
Facility: HOSPITAL | Age: 80
Setting detail: RECURRING SERIES
Discharge: HOME | End: 2025-01-05
Payer: MEDICARE

## 2025-01-02 PROCEDURE — 97140 MANUAL THERAPY 1/> REGIONS: CPT

## 2025-01-02 PROCEDURE — 97110 THERAPEUTIC EXERCISES: CPT

## 2025-01-02 PROCEDURE — 97112 NEUROMUSCULAR REEDUCATION: CPT

## 2025-01-02 PROCEDURE — 97530 THERAPEUTIC ACTIVITIES: CPT

## 2025-01-02 NOTE — THERAPY DISCHARGE
Physical Therapy Discharge Instructions      [facility] - [address], [address]  [place] [address] [phone] fax    Patient: Chepe Ramírez  : 1945      Continue Home Exercise Program 1-2 times per day for 2 weeks, then decrease to 3-5 times per week      Continue with    [x] Ice  as needed PRN times per day     [x] Heat           Follow up with MD:     [] Upon completion of therapy     [x] As needed      Recommendations:     [x]   Return to activity with home program    []   Return to activity with the following modifications:       []Post Rehab Program    []Join Independent aquatic program     []Return to/join local gym      Additional Comments:        Elizabeth Berger, PT 2025 10:41 AM

## 2025-01-02 NOTE — THERAPY DISCHARGE
PT DISCHARGE DAILY NOTE AND SUMMARY     If signature is requested please return to:    [facility]  Fax: [phone]    Date:2025  Patient name: Chepe Ramírez Start of Care: 10/25/24   Referral source: Justnia Ballard MD : 1945   Medical/Treatment Diagnosis: Pain in left knee [M25.562] Onset Date:10/2/24     Prior Hospitalization: see medical history Provider#: [id number]   Comorbidities: Musculoskeletal disorders, Uncorrected hearing or vision impairment, Social determinants of health: Depression, and Other: Hx Skin CA , Heart Disease, HTN, arthritis   Prior Level of Function:Independent ADLs and IADLs; main caregiver for disabled wife   Insurance: Payor: MEDICARE / Plan: MEDICARE PART A AND B / Product Type: *No Product type* /      Visits from Start of Care: 21 Missed Visits: 0    Medicare: Reporting Period (date from last assessment to current assessment): 24- 25    Patient  verified yes     Visit #   Current / Total 5 8   Time   In / Out 1010 1103   Pain   In / Out 3 3   Subjective Functional Status/Changes: I have been coming for 3 months, I think that is long enough. I still have the pain. I am doing pretty good today. I did a lot in the garage yesterday and needed the TENS and pain medication and it helped make it more controlled.      TREATMENT AREA =  Pain in left knee [M25.562]      OBJECTIVE    Modalities Rationale:     decrease pain to improve patient's ability to progress to PLOF and address remaining functional goals.     min [] Estim Unattended, type/location:                                      []  w/ice    []  w/heat    min [] Estim Attended, type/location:                                     []  w/US     []  w/ice    []  w/heat    []  TENS insruct      min []  Mechanical Traction: type/lbs                   []  pro   []  sup   []  int   []  cont    []  before manual    []  after manual    min []  Ultrasound, settings/location:     PD min  unbill

## 2025-01-14 RX ORDER — FINASTERIDE 5 MG/1
TABLET, FILM COATED ORAL
Qty: 90 TABLET | Refills: 1 | Status: SHIPPED | OUTPATIENT
Start: 2025-01-14

## 2025-01-20 NOTE — TELEPHONE ENCOUNTER
This patient contacted the office for the following prescriptions to be refilled:    Medication requested :   Requested Prescriptions     Pending Prescriptions Disp Refills    alfuzosin (UROXATRAL) 10 MG extended release tablet 90 tablet 3     Sig: Take 1 tablet by mouth daily        Last Refilled: 9/20/2024    Last Office Visit: 8/5/2024    Next Office Visit: 2/5/2025    Last Labs: 1/30/2024

## 2025-01-21 RX ORDER — ALFUZOSIN HYDROCHLORIDE 10 MG/1
10 TABLET, EXTENDED RELEASE ORAL DAILY
Qty: 90 TABLET | Refills: 1 | Status: SHIPPED | OUTPATIENT
Start: 2025-01-21

## 2025-01-29 ENCOUNTER — HOSPITAL ENCOUNTER (OUTPATIENT)
Facility: HOSPITAL | Age: 80
Discharge: HOME | End: 2025-02-01
Payer: MEDICARE

## 2025-01-29 DIAGNOSIS — N40.0 BENIGN PROSTATIC HYPERPLASIA WITHOUT LOWER URINARY TRACT SYMPTOMS: ICD-10-CM

## 2025-01-29 DIAGNOSIS — E78.5 HYPERLIPIDEMIA, UNSPECIFIED HYPERLIPIDEMIA TYPE: ICD-10-CM

## 2025-01-29 DIAGNOSIS — I10 ESSENTIAL HYPERTENSION: ICD-10-CM

## 2025-01-29 DIAGNOSIS — R73.01 IMPAIRED FASTING BLOOD SUGAR: ICD-10-CM

## 2025-01-29 LAB
ALBUMIN SERPL-MCNC: 3.5 G/DL (ref 3.4–5)
ALBUMIN/GLOB SERPL: 1.2 (ref 0.8–1.7)
ALP SERPL-CCNC: 80 U/L (ref 45–117)
ALT SERPL-CCNC: 16 U/L (ref 16–61)
ANION GAP SERPL CALC-SCNC: 4 MMOL/L (ref 3–18)
AST SERPL-CCNC: 11 U/L (ref 10–38)
BILIRUB SERPL-MCNC: 0.7 MG/DL (ref 0.2–1)
BUN SERPL-MCNC: 20 MG/DL (ref 7–18)
BUN/CREAT SERPL: 21 (ref 12–20)
CALCIUM SERPL-MCNC: 9.2 MG/DL (ref 8.5–10.1)
CHLORIDE SERPL-SCNC: 108 MMOL/L (ref 100–111)
CHOLEST SERPL-MCNC: 145 MG/DL
CO2 SERPL-SCNC: 28 MMOL/L (ref 21–32)
CREAT SERPL-MCNC: 0.94 MG/DL (ref 0.6–1.3)
ERYTHROCYTE [DISTWIDTH] IN BLOOD BY AUTOMATED COUNT: 13.4 % (ref 11.6–14.5)
EST. AVERAGE GLUCOSE BLD GHB EST-MCNC: 126 MG/DL
GLOBULIN SER CALC-MCNC: 3 G/DL (ref 2–4)
GLUCOSE SERPL-MCNC: 86 MG/DL (ref 74–99)
HBA1C MFR BLD: 6 % (ref 4.2–5.6)
HCT VFR BLD AUTO: 43 % (ref 36–48)
HDLC SERPL-MCNC: 98 MG/DL (ref 40–60)
HDLC SERPL: 1.5 (ref 0–5)
HGB BLD-MCNC: 14.1 G/DL (ref 13–16)
LDLC SERPL CALC-MCNC: 34.2 MG/DL (ref 0–100)
LIPID PANEL: ABNORMAL
MCH RBC QN AUTO: 31.3 PG (ref 24–34)
MCHC RBC AUTO-ENTMCNC: 32.8 G/DL (ref 31–37)
MCV RBC AUTO: 95.6 FL (ref 78–100)
NRBC # BLD: 0 K/UL (ref 0–0.01)
NRBC BLD-RTO: 0 PER 100 WBC
PLATELET # BLD AUTO: 251 K/UL (ref 135–420)
PMV BLD AUTO: 11.3 FL (ref 9.2–11.8)
POTASSIUM SERPL-SCNC: 4.6 MMOL/L (ref 3.5–5.5)
PROT SERPL-MCNC: 6.5 G/DL (ref 6.4–8.2)
RBC # BLD AUTO: 4.5 M/UL (ref 4.35–5.65)
SODIUM SERPL-SCNC: 140 MMOL/L (ref 136–145)
TRIGL SERPL-MCNC: 64 MG/DL
VLDLC SERPL CALC-MCNC: 12.8 MG/DL
WBC # BLD AUTO: 5.7 K/UL (ref 4.6–13.2)

## 2025-01-29 PROCEDURE — 85027 COMPLETE CBC AUTOMATED: CPT

## 2025-01-29 PROCEDURE — 83036 HEMOGLOBIN GLYCOSYLATED A1C: CPT

## 2025-01-29 PROCEDURE — 36415 COLL VENOUS BLD VENIPUNCTURE: CPT

## 2025-01-29 PROCEDURE — 80061 LIPID PANEL: CPT

## 2025-01-29 PROCEDURE — 80053 COMPREHEN METABOLIC PANEL: CPT

## 2025-02-02 SDOH — ECONOMIC STABILITY: TRANSPORTATION INSECURITY
IN THE PAST 12 MONTHS, HAS THE LACK OF TRANSPORTATION KEPT YOU FROM MEDICAL APPOINTMENTS OR FROM GETTING MEDICATIONS?: NO

## 2025-02-02 SDOH — ECONOMIC STABILITY: FOOD INSECURITY: WITHIN THE PAST 12 MONTHS, YOU WORRIED THAT YOUR FOOD WOULD RUN OUT BEFORE YOU GOT MONEY TO BUY MORE.: NEVER TRUE

## 2025-02-02 SDOH — ECONOMIC STABILITY: FOOD INSECURITY: WITHIN THE PAST 12 MONTHS, THE FOOD YOU BOUGHT JUST DIDN'T LAST AND YOU DIDN'T HAVE MONEY TO GET MORE.: NEVER TRUE

## 2025-02-02 SDOH — ECONOMIC STABILITY: INCOME INSECURITY: IN THE LAST 12 MONTHS, WAS THERE A TIME WHEN YOU WERE NOT ABLE TO PAY THE MORTGAGE OR RENT ON TIME?: NO

## 2025-02-05 ENCOUNTER — OFFICE VISIT (OUTPATIENT)
Facility: CLINIC | Age: 80
End: 2025-02-05
Payer: MEDICARE

## 2025-02-05 VITALS
SYSTOLIC BLOOD PRESSURE: 128 MMHG | WEIGHT: 159.4 LBS | HEART RATE: 63 BPM | OXYGEN SATURATION: 98 % | RESPIRATION RATE: 16 BRPM | BODY MASS INDEX: 24.16 KG/M2 | HEIGHT: 68 IN | TEMPERATURE: 97.3 F | DIASTOLIC BLOOD PRESSURE: 70 MMHG

## 2025-02-05 DIAGNOSIS — G89.29 CHRONIC RIGHT SHOULDER PAIN: ICD-10-CM

## 2025-02-05 DIAGNOSIS — G89.29 CHRONIC MIDLINE LOW BACK PAIN WITHOUT SCIATICA: Primary | ICD-10-CM

## 2025-02-05 DIAGNOSIS — M54.50 CHRONIC MIDLINE LOW BACK PAIN WITHOUT SCIATICA: Primary | ICD-10-CM

## 2025-02-05 DIAGNOSIS — M25.511 CHRONIC RIGHT SHOULDER PAIN: ICD-10-CM

## 2025-02-05 DIAGNOSIS — G89.29 CHRONIC PAIN OF LEFT KNEE: ICD-10-CM

## 2025-02-05 DIAGNOSIS — M25.562 CHRONIC PAIN OF LEFT KNEE: ICD-10-CM

## 2025-02-05 DIAGNOSIS — G56.01 CARPAL TUNNEL SYNDROME ON RIGHT: ICD-10-CM

## 2025-02-05 DIAGNOSIS — R73.01 IMPAIRED FASTING BLOOD SUGAR: ICD-10-CM

## 2025-02-05 PROCEDURE — 99214 OFFICE O/P EST MOD 30 MIN: CPT | Performed by: FAMILY MEDICINE

## 2025-02-05 PROCEDURE — 1159F MED LIST DOCD IN RCRD: CPT | Performed by: FAMILY MEDICINE

## 2025-02-05 PROCEDURE — G8427 DOCREV CUR MEDS BY ELIG CLIN: HCPCS | Performed by: FAMILY MEDICINE

## 2025-02-05 PROCEDURE — 1125F AMNT PAIN NOTED PAIN PRSNT: CPT | Performed by: FAMILY MEDICINE

## 2025-02-05 PROCEDURE — 1160F RVW MEDS BY RX/DR IN RCRD: CPT | Performed by: FAMILY MEDICINE

## 2025-02-05 PROCEDURE — 1036F TOBACCO NON-USER: CPT | Performed by: FAMILY MEDICINE

## 2025-02-05 PROCEDURE — 1123F ACP DISCUSS/DSCN MKR DOCD: CPT | Performed by: FAMILY MEDICINE

## 2025-02-05 PROCEDURE — G8420 CALC BMI NORM PARAMETERS: HCPCS | Performed by: FAMILY MEDICINE

## 2025-02-05 PROCEDURE — 3078F DIAST BP <80 MM HG: CPT | Performed by: FAMILY MEDICINE

## 2025-02-05 PROCEDURE — 3074F SYST BP LT 130 MM HG: CPT | Performed by: FAMILY MEDICINE

## 2025-02-05 NOTE — PROGRESS NOTES
\"Have you been to the ER, urgent care clinic since your last visit?  Hospitalized since your last visit?\"    [facility] surgery LOV: 10/01/24    “Have you seen or consulted any other health care providers outside our system since your last visit?”    NO    Chief Complaint   Patient presents with    Cholesterol Problem    Blood Sugar Problem    Hypertension    Benign Prostatic Hypertrophy            
Impaired fasting blood sugar  4. Chronic pain of left knee  -     diclofenac sodium (VOLTAREN) 1 % GEL; Apply 4 g topically 4 times daily, Topical, 4 TIMES DAILY Starting Wed 2/5/2025, Disp-100 g, R-0, Normal  5. Carpal tunnel syndrome on right    Return in about 4 months (around 6/5/2025) for wellness + follow up .       Subjective   History of Present Illness  The patient is a 79-year-old male who presents for evaluation of back pain, right shoulder pain, left knee pain, and carpal tunnel syndrome.    He reports experiencing pain in his shoulders, arms, back, and knees. He has not sought medical attention for his back pain, which was exacerbated after lifting his wife from the floor following her fall on Sunday. The pain does not radiate into his legs. He has not undergone any physical therapy for his back or right shoulder and expresses no interest in doing so. He reports no recent falls or injuries.    He experiences more severe pain in his right shoulder compared to the left, despite having arthritis in both shoulders. He received a shoulder injection from Dr. Raphael some time ago, but it did not provide long-lasting relief. He is able to elevate his right shoulder and was able to perform yard work yesterday. He is uncertain about the factors that exacerbate his shoulder pain, noting that it is sometimes worse than at other times. He finds intermittent relief from using Salonpas patches on his back and shoulder, but these do not alleviate his knee pain.    He underwent a total knee replacement surgery in October 2024, but continues to experience pain in his left knee. He has completed 16 sessions of physical therapy for his knee. He reports swelling in his knee, but no other joint swelling. He has been managing his pain with Tylenol and Motrin, taken intermittently.    He requires hand surgery, but is currently unable to undergo the procedure. He experiences frequent numbness in his hands and sharp pain when

## 2025-02-06 ENCOUNTER — HOSPITAL ENCOUNTER (OUTPATIENT)
Facility: HOSPITAL | Age: 80
Discharge: HOME | End: 2025-02-09
Payer: MEDICARE

## 2025-02-06 DIAGNOSIS — M25.511 CHRONIC RIGHT SHOULDER PAIN: ICD-10-CM

## 2025-02-06 DIAGNOSIS — G89.29 CHRONIC MIDLINE LOW BACK PAIN WITHOUT SCIATICA: ICD-10-CM

## 2025-02-06 DIAGNOSIS — M54.50 CHRONIC MIDLINE LOW BACK PAIN WITHOUT SCIATICA: ICD-10-CM

## 2025-02-06 DIAGNOSIS — G89.29 CHRONIC RIGHT SHOULDER PAIN: ICD-10-CM

## 2025-02-06 PROCEDURE — 72100 X-RAY EXAM L-S SPINE 2/3 VWS: CPT

## 2025-02-06 PROCEDURE — 73030 X-RAY EXAM OF SHOULDER: CPT

## 2025-02-20 NOTE — PROGRESS NOTES
Amina Mar is a 78 y.o. male, evaluated via audio-only technology on 9/13/2021 for Other (Cough, chest discomfort)  . Assessment & Plan:   Diagnoses and all orders for this visit:    Chest discomfort: Going on since 10 days. More inspirational discomfort. Dry cough. Could be from ACE inhibitor. Associated dizziness. Present most of the time. No diaphoresis. Some breathing discomfort. I have advised him to go to the urgent care or ER to evaluate further. He needs labs, chest x-ray and EKG. Cough    Dizziness    Patient understood agree with the plan  Advised him to make a follow-up appointment after urgent care or ER visit to discuss it further. Please note that this dictation was completed with [employer], the computer voice recognition software. Quite often unanticipated grammatical, syntax, homophones, and other interpretive errors are inadvertently transcribed by the computer software. Please disregard these errors. Please excuse any errors that have escaped final proofreading. 12  Subjective:   Done visit to telephone encounter. He made his appointment with a concern of dry cough going on since more than 10 days. He is on ACE inhibitor. Has risk factor for coronary artery disease. On statin. Currently complaining of chest discomfort present most of the time since 10 days. Associated dizziness but no diaphoresis. No nausea or vomiting. No bowel complaints. Appetite is fair. No wheezing. Able to talk in full sentences. Does feel breathing discomfort with exertion. No recent exposure on Covid positive person. Has no fever. Has no loss of taste or smell. No abdominal pain. No mood changes. Due to his ongoing chest discomfort with dizziness and inspirational discomfort advised patient to go to urgent care or ER for further evaluation    Prior to Admission medications    Medication Sig Start Date End Date Taking?  Authorizing Provider   atorvastatin (LIPITOR) 20 mg tablet TAKE 1 Patient back from IR via cart.  Dressing to right chest C/D/I.   TABLET NIGHTLY 9/13/21  Yes Wilma Knutson MD   finasteride (PROSCAR) 5 mg tablet TAKE 1 TABLET DAILY 8/3/21  Yes Wilma Knutson MD   alfuzosin SR (UROXATRAL) 10 mg SR tablet TAKE 1 TABLET DAILY AFTER DINNER 7/6/21  Yes Wilma Knutson MD   lisinopriL (PRINIVIL, ZESTRIL) 10 mg tablet TAKE 1 TABLET DAILY 6/2/21  Yes Wilma Knutson MD   omeprazole (PRILOSEC) 20 mg capsule TAKE 1 CAPSULE DAILY  Patient taking differently: two (2) times a day. 6/24/20  Yes Wilma Knutson MD   atorvastatin (LIPITOR) 20 mg tablet TAKE 1 TABLET NIGHTLY 3/15/21 9/13/21  Wilma Knutson MD   fluticasone propionate (Flonase Allergy Relief) 50 mcg/actuation nasal spray 2 Sprays by Both Nostrils route daily. Provider, Historical   lisinopriL (PRINIVIL, ZESTRIL) 10 mg tablet TAKE 1 TABLET DAILY 6/24/20   Wilma Knutson MD   sildenafiL, pulm. hypertension, (REVATIO) 20 mg tablet Take 1 Tab by mouth as needed for Other (ED). Take up to 5 tablets as needed. Do not exceed 5 tablets. Take on an empty stomach. Patient not taking: Reported on 9/13/2021 3/10/20   Nathan Amado MD   ibuprofen (MOTRIN IB) 200 mg tablet Take 400 mg by mouth every six (6) hours as needed for Pain. Other, MD Param   acetaminophen (TYLENOL) 500 mg tablet Take  by mouth every six (6) hours as needed for Pain.     Provider, Historical     Patient Active Problem List    Diagnosis Date Noted    History of colon polyps 02/21/2019    Hearing problem of both ears 12/03/2018    Arthritis 12/03/2018    Hyperlipidemia 12/03/2018    Essential hypertension 12/03/2018    Groin pain 08/15/2014    Urinary frequency 08/15/2014    Hernia 08/15/2014    Left lower quadrant pain 08/15/2014    Groin fluid collection 08/15/2014    Benign prostatic hyperplasia 08/04/2014    Kidney stone 08/04/2014     Current Outpatient Medications   Medication Sig Dispense Refill    atorvastatin (LIPITOR) 20 mg tablet TAKE 1 TABLET NIGHTLY 30 Tablet 0    finasteride (PROSCAR) 5 mg tablet TAKE 1 TABLET DAILY 90 Tablet 0    alfuzosin SR (UROXATRAL) 10 mg SR tablet TAKE 1 TABLET DAILY AFTER DINNER 90 Tablet 1    lisinopriL (PRINIVIL, ZESTRIL) 10 mg tablet TAKE 1 TABLET DAILY 90 Tablet 1    omeprazole (PRILOSEC) 20 mg capsule TAKE 1 CAPSULE DAILY (Patient taking differently: two (2) times a day.) 90 Cap 3    fluticasone propionate (Flonase Allergy Relief) 50 mcg/actuation nasal spray 2 Sprays by Both Nostrils route daily.  sildenafiL, pulm. hypertension, (REVATIO) 20 mg tablet Take 1 Tab by mouth as needed for Other (ED). Take up to 5 tablets as needed. Do not exceed 5 tablets. Take on an empty stomach. (Patient not taking: Reported on 9/13/2021) 90 Tab 3    ibuprofen (MOTRIN IB) 200 mg tablet Take 400 mg by mouth every six (6) hours as needed for Pain.  acetaminophen (TYLENOL) 500 mg tablet Take  by mouth every six (6) hours as needed for Pain. No Known Allergies  Past Medical History:   Diagnosis Date    Basal cell carcinoma     BPH (benign prostatic hyperplasia)     Cancer (HCC)     Basal Cell Carcinoma    Depression     Gastrointestinal disorder     Diverticulitis    GERD (gastroesophageal reflux disease)     History of colon polyps 2/21/2019    Hypertension     Inguinal hernia     left side    Other ill-defined conditions(799.89)     BPH    Psychiatric disorder     Aggression, Depression     Social History     Tobacco Use    Smoking status: Former Smoker    Smokeless tobacco: Never Used    Tobacco comment: quit 1971   Substance Use Topics    Alcohol use:  Yes     Alcohol/week: 1.7 standard drinks     Types: 2 Cans of beer per week     Comment: wine 2-3 x per week with dinner       ROS    Patient-Reported Vitals 9/13/2021   Patient-Reported Weight -   Patient-Reported Height -   Patient-Reported Pulse 65   Patient-Reported Temperature -   Patient-Reported SpO2 99%   Patient-Reported Systolic  253   Patient-Reported Diastolic 80       Stacy Vines, who was evaluated through a patient-initiated, synchronous (real-time) audio only encounter, and/or her healthcare decision maker, is aware that it is a billable service, with coverage as determined by his insurance carrier. He provided verbal consent to proceed: Yes. He has not had a related appointment within my department in the past 7 days or scheduled within the next 24 hours. Spend 11-20 minutes discussing current problem, review prior results and notes on date 9/13/2021 with telephone conversations.    Juan Chacon MD

## 2025-03-19 RX ORDER — ALFUZOSIN HYDROCHLORIDE 10 MG/1
TABLET, EXTENDED RELEASE ORAL
Qty: 90 TABLET | Refills: 1 | Status: SHIPPED | OUTPATIENT
Start: 2025-03-19

## 2025-03-27 RX ORDER — ATORVASTATIN CALCIUM 20 MG/1
20 TABLET, FILM COATED ORAL NIGHTLY
Qty: 90 TABLET | Refills: 1 | Status: SHIPPED | OUTPATIENT
Start: 2025-03-27

## 2025-06-07 SDOH — HEALTH STABILITY: PHYSICAL HEALTH: ON AVERAGE, HOW MANY DAYS PER WEEK DO YOU ENGAGE IN MODERATE TO STRENUOUS EXERCISE (LIKE A BRISK WALK)?: 2 DAYS

## 2025-06-07 SDOH — HEALTH STABILITY: PHYSICAL HEALTH: ON AVERAGE, HOW MANY MINUTES DO YOU ENGAGE IN EXERCISE AT THIS LEVEL?: 30 MIN

## 2025-06-07 ASSESSMENT — LIFESTYLE VARIABLES
HOW OFTEN DO YOU HAVE SIX OR MORE DRINKS ON ONE OCCASION: 1
HOW OFTEN DURING THE LAST YEAR HAVE YOU HAD A FEELING OF GUILT OR REMORSE AFTER DRINKING: NEVER
HOW MANY STANDARD DRINKS CONTAINING ALCOHOL DO YOU HAVE ON A TYPICAL DAY: 1 OR 2
HOW OFTEN DO YOU HAVE A DRINK CONTAINING ALCOHOL: 5
HOW OFTEN DURING THE LAST YEAR HAVE YOU NEEDED AN ALCOHOLIC DRINK FIRST THING IN THE MORNING TO GET YOURSELF GOING AFTER A NIGHT OF HEAVY DRINKING: NEVER
HOW OFTEN DURING THE LAST YEAR HAVE YOU HAD A FEELING OF GUILT OR REMORSE AFTER DRINKING: NEVER
HAS A RELATIVE, FRIEND, DOCTOR, OR ANOTHER HEALTH PROFESSIONAL EXPRESSED CONCERN ABOUT YOUR DRINKING OR SUGGESTED YOU CUT DOWN: NO
HAVE YOU OR SOMEONE ELSE BEEN INJURED AS A RESULT OF YOUR DRINKING: NO
HAVE YOU OR SOMEONE ELSE BEEN INJURED AS A RESULT OF YOUR DRINKING: NO
HOW OFTEN DURING THE LAST YEAR HAVE YOU BEEN UNABLE TO REMEMBER WHAT HAPPENED THE NIGHT BEFORE BECAUSE YOU HAD BEEN DRINKING: NEVER
HOW OFTEN DURING THE LAST YEAR HAVE YOU FAILED TO DO WHAT WAS NORMALLY EXPECTED FROM YOU BECAUSE OF DRINKING: NEVER
HOW OFTEN DURING THE LAST YEAR HAVE YOU FOUND THAT YOU WERE NOT ABLE TO STOP DRINKING ONCE YOU HAD STARTED: NEVER
HOW OFTEN DURING THE LAST YEAR HAVE YOU FAILED TO DO WHAT WAS NORMALLY EXPECTED FROM YOU BECAUSE OF DRINKING: NEVER
HOW OFTEN DURING THE LAST YEAR HAVE YOU FOUND THAT YOU WERE NOT ABLE TO STOP DRINKING ONCE YOU HAD STARTED: NEVER
HAS A RELATIVE, FRIEND, DOCTOR, OR ANOTHER HEALTH PROFESSIONAL EXPRESSED CONCERN ABOUT YOUR DRINKING OR SUGGESTED YOU CUT DOWN: NO
HOW OFTEN DURING THE LAST YEAR HAVE YOU BEEN UNABLE TO REMEMBER WHAT HAPPENED THE NIGHT BEFORE BECAUSE YOU HAD BEEN DRINKING: NEVER
HOW MANY STANDARD DRINKS CONTAINING ALCOHOL DO YOU HAVE ON A TYPICAL DAY: 1
HOW OFTEN DO YOU HAVE A DRINK CONTAINING ALCOHOL: 4 OR MORE TIMES A WEEK
HOW OFTEN DURING THE LAST YEAR HAVE YOU NEEDED AN ALCOHOLIC DRINK FIRST THING IN THE MORNING TO GET YOURSELF GOING AFTER A NIGHT OF HEAVY DRINKING: NEVER

## 2025-06-07 ASSESSMENT — PATIENT HEALTH QUESTIONNAIRE - PHQ9
SUM OF ALL RESPONSES TO PHQ QUESTIONS 1-9: 1
2. FEELING DOWN, DEPRESSED OR HOPELESS: SEVERAL DAYS
1. LITTLE INTEREST OR PLEASURE IN DOING THINGS: NOT AT ALL
SUM OF ALL RESPONSES TO PHQ QUESTIONS 1-9: 1

## 2025-06-10 ENCOUNTER — OFFICE VISIT (OUTPATIENT)
Facility: CLINIC | Age: 80
End: 2025-06-10
Payer: MEDICARE

## 2025-06-10 VITALS
TEMPERATURE: 97.1 F | SYSTOLIC BLOOD PRESSURE: 120 MMHG | OXYGEN SATURATION: 96 % | RESPIRATION RATE: 16 BRPM | HEART RATE: 60 BPM | DIASTOLIC BLOOD PRESSURE: 60 MMHG | HEIGHT: 68 IN | WEIGHT: 153.8 LBS | BODY MASS INDEX: 23.31 KG/M2

## 2025-06-10 DIAGNOSIS — Z00.00 MEDICARE ANNUAL WELLNESS VISIT, SUBSEQUENT: Primary | ICD-10-CM

## 2025-06-10 DIAGNOSIS — R73.01 IMPAIRED FASTING BLOOD SUGAR: ICD-10-CM

## 2025-06-10 DIAGNOSIS — Z97.4 DOES USE HEARING AID: ICD-10-CM

## 2025-06-10 DIAGNOSIS — N40.0 BENIGN PROSTATIC HYPERPLASIA WITHOUT LOWER URINARY TRACT SYMPTOMS: ICD-10-CM

## 2025-06-10 DIAGNOSIS — Z71.89 ADVANCED CARE PLANNING/COUNSELING DISCUSSION: ICD-10-CM

## 2025-06-10 PROCEDURE — 1125F AMNT PAIN NOTED PAIN PRSNT: CPT | Performed by: FAMILY MEDICINE

## 2025-06-10 PROCEDURE — 3074F SYST BP LT 130 MM HG: CPT | Performed by: FAMILY MEDICINE

## 2025-06-10 PROCEDURE — 3078F DIAST BP <80 MM HG: CPT | Performed by: FAMILY MEDICINE

## 2025-06-10 PROCEDURE — 1123F ACP DISCUSS/DSCN MKR DOCD: CPT | Performed by: FAMILY MEDICINE

## 2025-06-10 PROCEDURE — G0439 PPPS, SUBSEQ VISIT: HCPCS | Performed by: FAMILY MEDICINE

## 2025-06-10 PROCEDURE — 99497 ADVNCD CARE PLAN 30 MIN: CPT | Performed by: FAMILY MEDICINE

## 2025-06-10 RX ORDER — CEPHALEXIN 500 MG/1
TABLET, FILM COATED ORAL
COMMUNITY
Start: 2025-05-01

## 2025-06-10 RX ORDER — IBUPROFEN 200 MG
TABLET ORAL
COMMUNITY
Start: 2024-10-01

## 2025-06-10 RX ORDER — ALBUTEROL SULFATE 90 UG/1
INHALANT RESPIRATORY (INHALATION)
COMMUNITY
Start: 2025-03-09

## 2025-06-10 RX ORDER — MELOXICAM 15 MG/1
15 TABLET ORAL DAILY
COMMUNITY
Start: 2025-05-13

## 2025-06-10 NOTE — ACP (ADVANCE CARE PLANNING)
Advance Care Planning     General Advance Care Planning (ACP) Conversation    Date of Conversation: 6/10/2025  Conducted with: Patient with Decision Making Capacity  Other persons present: None    Healthcare Decision Maker:   Primary Decision Maker: Eva Ramírez - Child - [phone]     Today we documented Decision Maker(s) consistent with ACP documents on file.  Content/Action Overview:  Has ACP document(s) on file - reflects the patient's care preferences  Reviewed DNR/DNI and patient confirms current DNR status - completed forms on file (place new order if needed)  treatment goals, benefit/burden of treatment options, artificial nutrition, ventilation preferences, hospitalization preferences, resuscitation preferences, end of life care preferences (vegetative state/imminent death), and hospice care      Length of Voluntary ACP Conversation in minutes:  16 minutes    Justina Ballard MD

## 2025-06-10 NOTE — PROGRESS NOTES
Medicare Annual Wellness Visit    Chepe Ramírez is here for Medicare AWV and Blood Sugar Problem    Assessment & Plan  1. Medicare wellness visit.  - Successfully completed the clock test and the three-word recall test, indicating no current concerns regarding memory or cognitive function decline.  - No issues managing finances or performing routine chores.  - Advised to receive the RSV vaccine and a COVID-19 booster if not taken in the last 6 months.  - Blood tests to be completed before the next visit.    2. Left knee pain.  - Persistent pain in the left knee following a fall on 05/2025.  - X-rays confirmed alignment; continued soreness reported.  - Advised to use ice application to relieve inflammation and use topical treatments like Voltaren and Salonpas as needed.  - No recent falls reported; advised to avoid clutter to prevent future falls.    3. Enlarged prostate.  - Currently taking Proscar, which is reported to be effective.  - No current concerns with urination.  - Last urologist visit was 4-5 years ago; no recent follow-up needed.    Follow-up  - Follow-up visit scheduled in 6 months.    Medicare annual wellness visit, subsequent  Does use hearing aid    Results  Imaging   - X-ray of the left knee: Everything is in alignment       No follow-ups on file.     Subjective     History of Present Illness  The patient presents for a Medicare wellness visit.    He reports a general sense of well-being, with no significant health concerns at present. He is capable of managing his financial affairs independently and has not experienced any memory-related issues. His daily activities, including bathing, cooking, eating, grocery shopping, and driving, are performed without difficulty. He does not experience headaches, dizziness, nausea, vomiting, chest pain, respiratory distress, palpitations, abdominal pain, or urinary or bowel irregularities. He has undergone screening for hepatitis C.    He has a history of left

## 2025-06-10 NOTE — PROGRESS NOTES
Have you been to the ER, urgent care clinic since your last visit?  Hospitalized since your last visit?   [facility] LOV: 3/09/25. [facility] LOV: 4/2025 for cough.    Have you seen or consulted any other health care providers outside our system since your last visit?   Ophthalmology, Dr. Kushal Verduzco LOV: 2/10/25.    Chief Complaint   Patient presents with    Medicare AWV    Blood Sugar Problem

## 2025-06-16 RX ORDER — LISINOPRIL 10 MG/1
10 TABLET ORAL DAILY
Qty: 90 TABLET | Refills: 1 | Status: SHIPPED | OUTPATIENT
Start: 2025-06-16

## 2025-07-14 RX ORDER — FINASTERIDE 5 MG/1
5 TABLET, FILM COATED ORAL DAILY
Qty: 90 TABLET | Refills: 1 | Status: SHIPPED | OUTPATIENT
Start: 2025-07-14

## 2025-07-22 RX ORDER — OMEPRAZOLE 20 MG/1
20 CAPSULE, DELAYED RELEASE ORAL 2 TIMES DAILY
Qty: 180 CAPSULE | Refills: 1 | Status: SHIPPED | OUTPATIENT
Start: 2025-07-22

## 2025-09-05 ENCOUNTER — OFFICE VISIT (OUTPATIENT)
Age: 80
End: 2025-09-05
Payer: MEDICARE

## 2025-09-05 VITALS
HEART RATE: 70 BPM | OXYGEN SATURATION: 97 % | WEIGHT: 155 LBS | DIASTOLIC BLOOD PRESSURE: 60 MMHG | HEIGHT: 68 IN | BODY MASS INDEX: 23.49 KG/M2 | SYSTOLIC BLOOD PRESSURE: 130 MMHG

## 2025-09-05 DIAGNOSIS — R01.1 HEART MURMUR: ICD-10-CM

## 2025-09-05 DIAGNOSIS — R07.9 CHEST PAIN, UNSPECIFIED TYPE: Primary | ICD-10-CM

## 2025-09-05 PROCEDURE — G8420 CALC BMI NORM PARAMETERS: HCPCS | Performed by: INTERNAL MEDICINE

## 2025-09-05 PROCEDURE — 99214 OFFICE O/P EST MOD 30 MIN: CPT | Performed by: INTERNAL MEDICINE

## 2025-09-05 PROCEDURE — 3075F SYST BP GE 130 - 139MM HG: CPT | Performed by: INTERNAL MEDICINE

## 2025-09-05 PROCEDURE — 3078F DIAST BP <80 MM HG: CPT | Performed by: INTERNAL MEDICINE

## 2025-09-05 PROCEDURE — 1036F TOBACCO NON-USER: CPT | Performed by: INTERNAL MEDICINE

## 2025-09-05 PROCEDURE — G8428 CUR MEDS NOT DOCUMENT: HCPCS | Performed by: INTERNAL MEDICINE

## 2025-09-05 PROCEDURE — 1126F AMNT PAIN NOTED NONE PRSNT: CPT | Performed by: INTERNAL MEDICINE

## 2025-09-05 PROCEDURE — 1123F ACP DISCUSS/DSCN MKR DOCD: CPT | Performed by: INTERNAL MEDICINE

## (undated) DEVICE — PIN GUIDE FIX 3.2X62 MM SCREW GS9030620324P] KOMET MEDICAL]

## (undated) DEVICE — ZIP 16 SURGICAL SKIN CLOSURE DEVICE: Brand: ZIP 16 SURGICAL SKIN CLOSURE DEVICE

## (undated) DEVICE — BITE BLOCK ENDOSCP UNIV AD 6 TO 9.4 MM

## (undated) DEVICE — SYR 50ML SLIP TIP NSAF LF STRL --

## (undated) DEVICE — MEDI-VAC SUCTION HIGH CAPACITY: Brand: CARDINAL HEALTH

## (undated) DEVICE — MEDI-VAC NON-CONDUCTIVE SUCTION TUBING: Brand: CARDINAL HEALTH

## (undated) DEVICE — SOLUTION IRRIG 500ML 0.9% SOD CHLO USP POUR PLAS BTL

## (undated) DEVICE — (D)GLOVE EXAM LG NITRL NS -- DISC BY MFR NO SUB

## (undated) DEVICE — ENDOSCOPY PUMP TUBING/ CAP SET: Brand: ERBE

## (undated) DEVICE — CATHETER SUCT TR FL TIP 14FR W/ O CTRL

## (undated) DEVICE — FLEX ADVANTAGE 3000CC: Brand: FLEX ADVANTAGE

## (undated) DEVICE — FLUFF AND POLYMER UNDERPAD,EXTRA HEAVY: Brand: WINGS

## (undated) DEVICE — SOLUTION IRRIG 1000ML H2O STRL BLT

## (undated) DEVICE — SOLUTION IV 100ML 0.9% SOD CHL PLAS CONT USP VIAFLX 1 PER

## (undated) DEVICE — SWAB CULT SGL AMIES W/O CHAR FOR THRT VAG SKIN HRT CULTSWAB

## (undated) DEVICE — SOLUTION IRRIG 3000ML LAC R FLX CONT

## (undated) DEVICE — AIRLIFE™ NASAL OXYGEN CANNULA CURVED, FLARED TIP WITH 14 FOOT (4.3 M) CRUSH-RESISTANT TUBING, OVER-THE-EAR STYLE: Brand: AIRLIFE™

## (undated) DEVICE — PIN GUIDE FIX 3.2X62 MM SCREW GS903A0620322P] KOMET MEDICAL]

## (undated) DEVICE — APPLICATOR MEDICATED 26 CC SOLUTION HI LT ORNG CHLORAPREP

## (undated) DEVICE — GAUZE SPONGES,16 PLY: Brand: CURITY

## (undated) DEVICE — ELECTRODE PT RET AD L9FT HI MOIST COND ADH HYDRGEL CORDED

## (undated) DEVICE — STRYKER PERFORMANCE SERIES SAGITTAL BLADE: Brand: STRYKER PERFORMANCE SERIES

## (undated) DEVICE — FCPS RAD JAW 4LC 240CM W/NDL -- BX/20 RADIAL JAW 4

## (undated) DEVICE — Device

## (undated) DEVICE — (D)GLOVE SURG TRIFLX 7.5 PWD L -- DISC BY MFR USE ITEM 302993

## (undated) DEVICE — SYRINGE MED 25GA 3ML L5/8IN SUBQ PLAS W/ DETACH NDL SFTY

## (undated) DEVICE — THE CANADY HYBRID PLASMA SCALPEL IS AN ELECTROSURGICAL PLASMA SCALPEL THAT USES AN 85MM BENDABLE PADDLE BLADE TIP. THE ELECTROSURGICAL PLASMA SCALPEL IS USED TO SIMULTANEOUSLY CUT AND COAGULATE BIOLOGICAL TISSUE.: Brand: CANADY HYBRID PLASMA PADDLE BLADE

## (undated) DEVICE — SWAB CULT LIQ STUART AGR AERB MOD IN BRK SGL RAYON TIP PLAS

## (undated) DEVICE — SUTURE STRATAFIX SYMMETRIC PDS + 1 SGL ARMED CT 18 IN LEN SXPP1A405

## (undated) DEVICE — BASIN EMESIS 500CC ROSE 250/CS 60/PLT: Brand: MEDEGEN MEDICAL PRODUCTS, LLC

## (undated) DEVICE — TABLE COVER: Brand: CONVERTORS